# Patient Record
Sex: MALE | Race: WHITE | Employment: UNEMPLOYED | URBAN - NONMETROPOLITAN AREA
[De-identification: names, ages, dates, MRNs, and addresses within clinical notes are randomized per-mention and may not be internally consistent; named-entity substitution may affect disease eponyms.]

---

## 2024-02-29 ENCOUNTER — OFFICE VISIT (OUTPATIENT)
Dept: VASCULAR SURGERY | Age: 59
End: 2024-02-29
Payer: MEDICAID

## 2024-02-29 ENCOUNTER — HOSPITAL ENCOUNTER (OUTPATIENT)
Dept: VASCULAR LAB | Age: 59
Discharge: HOME | End: 2024-02-29
Payer: MEDICAID

## 2024-02-29 VITALS
HEART RATE: 56 BPM | DIASTOLIC BLOOD PRESSURE: 91 MMHG | TEMPERATURE: 97.8 F | SYSTOLIC BLOOD PRESSURE: 138 MMHG | OXYGEN SATURATION: 99 %

## 2024-02-29 DIAGNOSIS — I73.9 CLAUDICATION (HCC): Primary | ICD-10-CM

## 2024-02-29 DIAGNOSIS — I70.245 ATHEROSCLEROSIS OF NATIVE ARTERY OF LEFT LOWER EXTREMITY WITH ULCERATION OF OTHER PART OF FOOT (HCC): ICD-10-CM

## 2024-02-29 PROCEDURE — 93923 UPR/LXTR ART STDY 3+ LVLS: CPT

## 2024-02-29 PROCEDURE — 99203 OFFICE O/P NEW LOW 30 MIN: CPT | Performed by: NURSE PRACTITIONER

## 2024-02-29 RX ORDER — ATORVASTATIN CALCIUM 40 MG/1
40 TABLET, FILM COATED ORAL DAILY
COMMUNITY

## 2024-02-29 RX ORDER — LOSARTAN POTASSIUM 25 MG/1
25 TABLET ORAL DAILY
COMMUNITY

## 2024-02-29 RX ORDER — CHOLECALCIFEROL (VITAMIN D3) 1250 MCG
50000 CAPSULE ORAL DAILY
COMMUNITY

## 2024-02-29 RX ORDER — KETOROLAC TROMETHAMINE 10 MG/1
10 TABLET, FILM COATED ORAL EVERY 6 HOURS PRN
COMMUNITY

## 2024-02-29 RX ORDER — HYDROCODONE BITARTRATE AND ACETAMINOPHEN 5; 325 MG/1; MG/1
1 TABLET ORAL EVERY 6 HOURS PRN
COMMUNITY

## 2024-02-29 RX ORDER — ESCITALOPRAM OXALATE 10 MG/1
10 TABLET ORAL DAILY
COMMUNITY

## 2024-02-29 NOTE — PROGRESS NOTES
Tyler Franks (:  1965) is a 58 y.o. male,New patient, here for evaluation of the following chief complaint(s):  New Patient (wounds ref by jorge mitchell)            SUBJECTIVE/OBJECTIVE:  Tyler has a history of peripheral vascular disease of the lower extremities. He had office TATYANA's with Dr. Mitchell.   He has had this for < 1 year. Current treatment includes lipitor.  Tyler has new wounds that have been present for over a year. Recently, he reports he has very little claudication.  He works out daily and is a .  He has no rest pain.  He sees Wound Care in [place].  He quit smoking 2 weeks ago.    I have personally reviewed the following: problem list, current meds, allergies, PMH, PSH, family hx, and social hx  Tyler Franks is a 58 y.o. male with the following history as recorded in [facility]:  There are no problems to display for this patient.    Current Outpatient Medications   Medication Sig Dispense Refill    atorvastatin (LIPITOR) 40 MG tablet Take 1 tablet by mouth daily      losartan (COZAAR) 25 MG tablet Take 1 tablet by mouth daily      ketorolac (TORADOL) 10 MG tablet Take 1 tablet by mouth every 6 hours as needed for Pain      HYDROcodone-acetaminophen (NORCO) 5-325 MG per tablet Take 1 tablet by mouth every 6 hours as needed for Pain. Max Daily Amount: 4 tablets      escitalopram (LEXAPRO) 10 MG tablet Take 1 tablet by mouth daily      vitamin D (VITAMIN D3) 11488 UNIT CAPS Take 1 capsule by mouth daily       No current facility-administered medications for this visit.     Allergies: Patient has no known allergies.  No past medical history on file.  Past Surgical History:   Procedure Laterality Date    HERNIA REPAIR      SHOULDER SURGERY       No family history on file.  Social History     Tobacco Use    Smoking status: Former     Current packs/day: 1.00     Average packs/day: 1 pack/day for 25.2 years (25.2 ttl pk-yrs)     Types: Cigarettes     Start date: 1999

## 2024-03-04 ENCOUNTER — TELEPHONE (OUTPATIENT)
Dept: VASCULAR SURGERY | Age: 59
End: 2024-03-04

## 2024-03-07 ENCOUNTER — TELEPHONE (OUTPATIENT)
Dept: VASCULAR SURGERY | Age: 59
End: 2024-03-07

## 2024-03-07 RX ORDER — SODIUM CHLORIDE 0.9 % (FLUSH) 0.9 %
5-40 SYRINGE (ML) INJECTION EVERY 12 HOURS SCHEDULED
OUTPATIENT
Start: 2024-03-07

## 2024-03-07 RX ORDER — CLONIDINE HYDROCHLORIDE 0.1 MG/1
0.1 TABLET ORAL PRN
OUTPATIENT
Start: 2024-03-07

## 2024-03-07 RX ORDER — SODIUM CHLORIDE 0.9 % (FLUSH) 0.9 %
5-40 SYRINGE (ML) INJECTION PRN
OUTPATIENT
Start: 2024-03-07

## 2024-03-07 RX ORDER — SODIUM CHLORIDE 9 MG/ML
INJECTION, SOLUTION INTRAVENOUS CONTINUOUS
OUTPATIENT
Start: 2024-03-07

## 2024-03-07 RX ORDER — SODIUM CHLORIDE 9 MG/ML
INJECTION, SOLUTION INTRAVENOUS PRN
OUTPATIENT
Start: 2024-03-07

## 2024-03-07 RX ORDER — ASPIRIN 81 MG/1
81 TABLET ORAL ONCE
OUTPATIENT
Start: 2024-03-07 | End: 2024-03-07

## 2024-03-07 NOTE — TELEPHONE ENCOUNTER
Left a message for the patient to go over the surgery information.  When I contacted the patient last week he stated he would like Shannan to speak with a member of his family.  We have called several times to do this with no answer.  I have left another message this morning.         ----- Message from KAMAR Elder sent at 3/6/2024  6:55 AM CST -----  Can you try to reach him.  I have called and left messages.  I do not see a number for the x wife he wants me to talk with

## 2024-03-07 NOTE — H&P
lower extremity with ulceration of other part of foot (HCC)     undiagnosed new problem with uncertain prognosis    Discussed management of PVD which includes:  continue lipitor to reduce risk of arterial thrombosis and to decrease rate of plaque buildup  Start asa daily  Strongly encourage start/continue statin therapy -   Recommend no smoking - discussed the effect tobacco has on illness;   Proceed with formal taniya today - Lower extremity arterial study: Right TATYANA 0.6, Left TATYANA 0.62.  Individual films reviewed: Yes.  These results were reviewed with the patient.  Disease process is acute or chronic illness or injury that poses a threat to life or bodily function    Has stage III CKD -- gfr 29  Discussed results of taniya and labs with patient.  If we proceed with angio to try to increase chances of healing foot wound, it could compromise the kidneys.  If we do nothing, wounds could progress and at some point lead to amputation.  He wants to discuss with Beena Porter.  We will send note to her for her review.      Addendum Options were discussed with the patient including continued medical management versus proceeding with angiography and potential intervention for non healing foot wound.  Patient has opted to proceed with angiography.  Risks have been discussed with the patient including but not limited to MI, death, CVA, bleed, nerve injury, infection, contrast nephropathy, possible need for dialysis, and need for further surgery.           Patient instructed to walk as much as possible.  Call our office with any progressive pain in leg(s) or hip(s) with walking.  Take good care of your feet.  Let us know right away if you develop a wound on your foot.      An electronic signature was used to authenticate this note.    --KAMAR Elder

## 2024-03-15 ENCOUNTER — HOSPITAL ENCOUNTER (OUTPATIENT)
Dept: INTERVENTIONAL RADIOLOGY/VASCULAR | Age: 59
Discharge: HOME | End: 2024-03-15
Payer: MEDICAID

## 2024-03-15 ENCOUNTER — HOSPITAL ENCOUNTER (OUTPATIENT)
Dept: CT IMAGING | Age: 59
Discharge: HOME | End: 2024-03-15
Payer: MEDICAID

## 2024-03-15 VITALS
HEART RATE: 58 BPM | RESPIRATION RATE: 12 BRPM | SYSTOLIC BLOOD PRESSURE: 168 MMHG | TEMPERATURE: 97.5 F | BODY MASS INDEX: 21.69 KG/M2 | OXYGEN SATURATION: 98 % | WEIGHT: 135 LBS | DIASTOLIC BLOOD PRESSURE: 81 MMHG | HEIGHT: 66 IN

## 2024-03-15 DIAGNOSIS — I73.9 PVD (PERIPHERAL VASCULAR DISEASE) (HCC): ICD-10-CM

## 2024-03-15 LAB
ANION GAP SERPL CALCULATED.3IONS-SCNC: 12 MMOL/L (ref 7–19)
BUN SERPL-MCNC: 29 MG/DL (ref 6–20)
CALCIUM SERPL-MCNC: 9.5 MG/DL (ref 8.6–10)
CHLORIDE SERPL-SCNC: 109 MMOL/L (ref 98–111)
CO2 SERPL-SCNC: 22 MMOL/L (ref 22–29)
CREAT SERPL-MCNC: 2.5 MG/DL (ref 0.5–1.2)
GLUCOSE SERPL-MCNC: 98 MG/DL (ref 74–109)
POTASSIUM SERPL-SCNC: 4.7 MMOL/L (ref 3.5–5)
SODIUM SERPL-SCNC: 143 MMOL/L (ref 136–145)

## 2024-03-15 PROCEDURE — 6360000002 HC RX W HCPCS: Performed by: NURSE PRACTITIONER

## 2024-03-15 PROCEDURE — 6360000002 HC RX W HCPCS: Performed by: SURGERY

## 2024-03-15 PROCEDURE — 36415 COLL VENOUS BLD VENIPUNCTURE: CPT

## 2024-03-15 PROCEDURE — 6360000004 HC RX CONTRAST MEDICATION: Performed by: SURGERY

## 2024-03-15 PROCEDURE — 6370000000 HC RX 637 (ALT 250 FOR IP): Performed by: NURSE PRACTITIONER

## 2024-03-15 PROCEDURE — C1769 GUIDE WIRE: HCPCS

## 2024-03-15 PROCEDURE — 2500000003 HC RX 250 WO HCPCS: Performed by: SURGERY

## 2024-03-15 PROCEDURE — 75635 CT ANGIO ABDOMINAL ARTERIES: CPT

## 2024-03-15 PROCEDURE — 2580000003 HC RX 258: Performed by: NURSE PRACTITIONER

## 2024-03-15 PROCEDURE — 80048 BASIC METABOLIC PNL TOTAL CA: CPT

## 2024-03-15 PROCEDURE — 36140 INTRO NDL ICATH UPR/LXTR ART: CPT

## 2024-03-15 PROCEDURE — 75710 ARTERY X-RAYS ARM/LEG: CPT

## 2024-03-15 RX ORDER — LIDOCAINE HYDROCHLORIDE 20 MG/ML
INJECTION, SOLUTION EPIDURAL; INFILTRATION; INTRACAUDAL; PERINEURAL PRN
Status: COMPLETED | OUTPATIENT
Start: 2024-03-15 | End: 2024-03-15

## 2024-03-15 RX ORDER — SODIUM CHLORIDE 0.9 % (FLUSH) 0.9 %
5-40 SYRINGE (ML) INJECTION EVERY 12 HOURS SCHEDULED
Status: DISCONTINUED | OUTPATIENT
Start: 2024-03-15 | End: 2024-03-15 | Stop reason: SDUPTHER

## 2024-03-15 RX ORDER — HEPARIN SODIUM 5000 [USP'U]/ML
INJECTION, SOLUTION INTRAVENOUS; SUBCUTANEOUS PRN
Status: COMPLETED | OUTPATIENT
Start: 2024-03-15 | End: 2024-03-15

## 2024-03-15 RX ORDER — ASPIRIN 81 MG/1
81 TABLET ORAL ONCE
Qty: 30 TABLET | Refills: 0 | Status: SHIPPED | OUTPATIENT
Start: 2024-03-15 | End: 2024-03-15

## 2024-03-15 RX ORDER — SODIUM CHLORIDE 0.9 % (FLUSH) 0.9 %
5-40 SYRINGE (ML) INJECTION EVERY 12 HOURS SCHEDULED
Status: DISCONTINUED | OUTPATIENT
Start: 2024-03-15 | End: 2024-03-17 | Stop reason: HOSPADM

## 2024-03-15 RX ORDER — DELAFLOXACIN MEGLUMINE 450 MG/1
TABLET ORAL
COMMUNITY
Start: 2024-03-04

## 2024-03-15 RX ORDER — SODIUM CHLORIDE 9 MG/ML
INJECTION, SOLUTION INTRAVENOUS CONTINUOUS
Status: DISCONTINUED | OUTPATIENT
Start: 2024-03-15 | End: 2024-03-17 | Stop reason: HOSPADM

## 2024-03-15 RX ORDER — FENTANYL CITRATE 50 UG/ML
INJECTION, SOLUTION INTRAMUSCULAR; INTRAVENOUS PRN
Status: COMPLETED | OUTPATIENT
Start: 2024-03-15 | End: 2024-03-15

## 2024-03-15 RX ORDER — MIDAZOLAM HYDROCHLORIDE 1 MG/ML
INJECTION INTRAMUSCULAR; INTRAVENOUS PRN
Status: COMPLETED | OUTPATIENT
Start: 2024-03-15 | End: 2024-03-15

## 2024-03-15 RX ORDER — ASPIRIN 81 MG/1
81 TABLET ORAL ONCE
Status: COMPLETED | OUTPATIENT
Start: 2024-03-15 | End: 2024-03-15

## 2024-03-15 RX ORDER — SODIUM CHLORIDE 0.9 % (FLUSH) 0.9 %
5-40 SYRINGE (ML) INJECTION PRN
Status: DISCONTINUED | OUTPATIENT
Start: 2024-03-15 | End: 2024-03-17 | Stop reason: HOSPADM

## 2024-03-15 RX ORDER — IODIXANOL 320 MG/ML
INJECTION, SOLUTION INTRAVASCULAR PRN
Status: COMPLETED | OUTPATIENT
Start: 2024-03-15 | End: 2024-03-15

## 2024-03-15 RX ORDER — SODIUM CHLORIDE 9 MG/ML
INJECTION, SOLUTION INTRAVENOUS PRN
Status: DISCONTINUED | OUTPATIENT
Start: 2024-03-15 | End: 2024-03-17 | Stop reason: HOSPADM

## 2024-03-15 RX ORDER — CLONIDINE HYDROCHLORIDE 0.1 MG/1
0.1 TABLET ORAL PRN
Status: DISCONTINUED | OUTPATIENT
Start: 2024-03-15 | End: 2024-03-17 | Stop reason: HOSPADM

## 2024-03-15 RX ORDER — SODIUM CHLORIDE 9 MG/ML
INJECTION, SOLUTION INTRAVENOUS PRN
Status: DISCONTINUED | OUTPATIENT
Start: 2024-03-15 | End: 2024-03-15 | Stop reason: SDUPTHER

## 2024-03-15 RX ORDER — SODIUM CHLORIDE 0.9 % (FLUSH) 0.9 %
5-40 SYRINGE (ML) INJECTION PRN
Status: DISCONTINUED | OUTPATIENT
Start: 2024-03-15 | End: 2024-03-15 | Stop reason: SDUPTHER

## 2024-03-15 RX ADMIN — IOPAMIDOL 70 ML: 755 INJECTION, SOLUTION INTRAVENOUS at 12:08

## 2024-03-15 RX ADMIN — HEPARIN SODIUM 5000 UNITS: 5000 INJECTION INTRAVENOUS; SUBCUTANEOUS at 11:21

## 2024-03-15 RX ADMIN — ASPIRIN 81 MG: 81 TABLET, COATED ORAL at 09:41

## 2024-03-15 RX ADMIN — CEFAZOLIN 2000 MG: 2 INJECTION, POWDER, FOR SOLUTION INTRAMUSCULAR; INTRAVENOUS at 11:08

## 2024-03-15 RX ADMIN — SODIUM CHLORIDE: 9 INJECTION, SOLUTION INTRAVENOUS at 09:32

## 2024-03-15 RX ADMIN — IODIXANOL 10 ML: 320 INJECTION, SOLUTION INTRAVASCULAR at 11:39

## 2024-03-15 RX ADMIN — FENTANYL CITRATE 25 MCG: 50 INJECTION, SOLUTION INTRAMUSCULAR; INTRAVENOUS at 11:23

## 2024-03-15 RX ADMIN — MIDAZOLAM 0.5 MG: 1 INJECTION INTRAMUSCULAR; INTRAVENOUS at 11:23

## 2024-03-15 RX ADMIN — LIDOCAINE HYDROCHLORIDE 10 ML: 20 INJECTION, SOLUTION EPIDURAL; INFILTRATION; INTRACAUDAL; PERINEURAL at 11:23

## 2024-03-15 NOTE — PROGRESS NOTES
Pt wanted his wallet locked up with security. Security responded to room and have pts wallet secured. Electronically signed by Yvette Mane RN on 3/15/2024 at 10:17 AM

## 2024-03-15 NOTE — PROGRESS NOTES
Dr Tapia at bedside discussing plan of care with pt and pt's sister. Tasia Gamez RN at bedside dressing pt's wound to L foot between first and second toe and bottom of foot. Electronically signed by Yvette aMne RN on 3/15/2024 at 2:55 PM

## 2024-03-15 NOTE — INTERVAL H&P NOTE
Update History & Physical    The patient's History and Physical of March 7, 2024 was reviewed with the patient and I examined the patient. There was no change. The surgical site was confirmed by the patient and me.     Plan: The risks, benefits, expected outcome, and alternative to the recommended procedure have been discussed with the patient. Patient understands and wants to proceed with the procedure.     ASA III, Mallampati 3    Electronically signed by Radha Tapia DO on 3/15/2024 at 11:07 AM

## 2024-03-15 NOTE — DISCHARGE INSTRUCTIONS
1. Keep dressing in place to groin site for 24 hours.  2 Okay to shower after 24 hours.  3.Apply bandaid to site daily for 3 days then remove and leave off.  4.Monitor site for any sign of infection. Notify MD if this occurs.  5.Rest until morning up to bathroom only.   6.Do not drive for 24 hours.  7.Drink one 8-10 oz glass of non-alcoholic liquid every one hour until bedtime.  8.Check the site after each activity for bleeding or swelling.  9.If bleeding occurs, apply pressure to site and call 911 or rush to nearest emergency room.  10.Keep affected leg straight until bedtime doing leg exercises to encourage blood flow, (Try wiggling your toes and rotating your ankle in circles)  11.Resume normal ( non-strenuous) activity tomorrow.  12.Bruising and soreness at the puncture site may occur.  This will heal and clear after several weeks.

## 2024-03-15 NOTE — OP NOTE
Patient Name: Tyler Franks   YOB: 1965   MRN: [id number]     Procedure Date: 3/15/2024     Pre Op Diagnosis: PAD with left foot wound    Post Op Diagnosis: same    Procedure: Left femoral access, left leg runoff    Anesthesia: Local with Moderate Sedation    Estimated Blood Loss: Minimal    Complications: None    Implants: none    Procedure Details: Patient was brought to the angiogram suite and placed supine position.  His bilateral groins were prepped and draped in sterile fashion.  Timeout was performed.  Left common femoral artery was accessed under continuous ultrasound guidance using sterile micropuncture technique.  The micropuncture wire was not able to be advanced into the common iliac artery and therefore a 5 Slovak glide sheath was placed and angiogram was performed using hand-injection into the sheath.  There was occlusion of external iliac artery from the distal point on with reconstitution using internal iliac artery into the aorta.  The proximal SFA and profunda appear to be patent, left common femoral artery appeared to be patent.  Sheath was removed and the manual pressure was held until hemostasis.  Since patient came in with hydration because of the chronic kidney disease we decided to use the hydration and perform CTA of aorta with runoff to define his occlusion and plan for surgical intervention.    Findings: occlusion of left common iliac and external iliac artery, patent distal external iliac, internal iliac artery, left common femoral, proximal SFA and profunda    Disposition:aroused from sedation, and taken to the recovery room in a stable condition    Radha Tapia DO  3/15/2024 11:41 AM

## 2024-03-15 NOTE — PROGRESS NOTES
Pt with increased ectopy with some short runs of non sustained vtach. See chart for rhythm strip. Pt asymptomatic with this. Dr Tapia notified, EKG completed. Dr Tapia en route. Electronically signed by Yvette Mane RN on 3/15/2024 at 11:03 AM  '

## 2024-03-15 NOTE — PROGRESS NOTES
Dr Tapia at bedside, ok to proceed with procedure per MD. Pt NSR with PVCs. Electronically signed by Yvette Mane RN on 3/15/2024 at 11:06 AM

## 2024-03-15 NOTE — PROGRESS NOTES
Pt ambulated approx 50 ft with RN and tolerated activity without complication. L groin site cdi with no s/s of bleeding or hematoma after ambulation. Discharge instructions reviewed with patient and patient states understanding. Patient discharged from cath holding with all belongings and AVS. Pt's sister plans to drive pt home.   Electronically signed by Yvette Mane RN on 3/15/2024 at 3:45 PM

## 2024-03-16 LAB
EKG P AXIS: 75 DEGREES
EKG P-R INTERVAL: 132 MS
EKG Q-T INTERVAL: 426 MS
EKG QRS DURATION: 76 MS
EKG QTC CALCULATION (BAZETT): 431 MS
EKG T AXIS: 62 DEGREES

## 2024-03-28 ENCOUNTER — OFFICE VISIT (OUTPATIENT)
Dept: VASCULAR SURGERY | Age: 59
End: 2024-03-28
Payer: MEDICAID

## 2024-03-28 VITALS
SYSTOLIC BLOOD PRESSURE: 141 MMHG | OXYGEN SATURATION: 100 % | DIASTOLIC BLOOD PRESSURE: 73 MMHG | TEMPERATURE: 97.2 F | HEART RATE: 63 BPM

## 2024-03-28 DIAGNOSIS — I73.9 PVD (PERIPHERAL VASCULAR DISEASE) (HCC): Primary | ICD-10-CM

## 2024-03-28 PROCEDURE — 99213 OFFICE O/P EST LOW 20 MIN: CPT | Performed by: SURGERY

## 2024-03-28 RX ORDER — HYDROCODONE BITARTRATE AND ACETAMINOPHEN 10; 325 MG/1; MG/1
1 TABLET ORAL EVERY 6 HOURS PRN
COMMUNITY

## 2024-03-28 NOTE — PROGRESS NOTES
Tyler Franks (:  1965) is a 58 y.o. male,Established patient, here for evaluation of the following chief complaint(s):  Follow-up (2 week follow up with Dr. Tapia)            SUBJECTIVE/OBJECTIVE:  Tyler has a history of peripheral vascular disease of the lower extremities.  He has had a wound. He works as a    Current treatment includes asa.  Tyler has had new wounds.   He is seeing Dr. Darby. Recently, he reports claudication at a distance of  short distance.  Tyler reports that the right leg is equal to the left.  He reports claudication is not changed and is mostly in the form of generalized weakness starting in the thighs and calves. He has a short recovery time. This is reproducible in nature. He reports ischemic rest pain 0 times per night.      I have personally reviewed the following: problem list, current meds, allergies, PMH, PSH, family hx, and social hx  Tyler Franks is a 58 y.o. male with the following history as recorded in [facility]:  Patient Active Problem List    Diagnosis Date Noted    PVD (peripheral vascular disease) ([facility]) 03/15/2024     Current Outpatient Medications   Medication Sig Dispense Refill    HYDROcodone-acetaminophen (NORCO)  MG per tablet Take 1 tablet by mouth every 6 hours as needed for Pain. Max Daily Amount: 4 tablets      BAXDELA 450 MG TABS tablet TAKE 1 TABLET BY MOUTH TWICE DAILY FOR 14 DAYS      atorvastatin (LIPITOR) 40 MG tablet Take 1 tablet by mouth daily      losartan (COZAAR) 25 MG tablet Take 1 tablet by mouth daily      vitamin D (VITAMIN D3) 19954 UNIT CAPS Take 1 capsule by mouth daily      aspirin (SB LOW DOSE ASA EC) 81 MG EC tablet Take 1 tablet by mouth once for 1 dose 30 tablet 0     No current facility-administered medications for this visit.     Allergies: Patient has no known allergies.  Past Medical History:   Diagnosis Date    CKD (chronic kidney disease) stage 3, GFR 30-59 ml/min ([facility])

## 2024-03-29 ENCOUNTER — TELEPHONE (OUTPATIENT)
Dept: VASCULAR SURGERY | Age: 59
End: 2024-03-29

## 2024-03-29 DIAGNOSIS — R06.02 SOB (SHORTNESS OF BREATH): Primary | ICD-10-CM

## 2024-03-29 NOTE — TELEPHONE ENCOUNTER
Called and let the patient know that we have him scheduled for a stress test on 04/09/2024 @ 1:00 PM.  This must be completed prior to surgery.  The patient should be NPO for 4 hours prior to this appointment.  The patient voiced understanding.         ----- Message from KAMAR Elder sent at 3/28/2024  2:49 PM CDT -----  Needs stress test; dx sob; then intraoperative angio with iliac angioplasty stent and fem fem bypass.  Remind me Monday and I will do a sheet

## 2024-04-09 ENCOUNTER — HOSPITAL ENCOUNTER (OUTPATIENT)
Dept: NON INVASIVE DIAGNOSTICS | Age: 59
Discharge: HOME | End: 2024-04-09
Payer: MEDICAID

## 2024-04-09 VITALS — WEIGHT: 134.48 LBS | BODY MASS INDEX: 21.71 KG/M2

## 2024-04-09 DIAGNOSIS — R06.02 SOB (SHORTNESS OF BREATH): ICD-10-CM

## 2024-04-09 PROCEDURE — 2500000003 HC RX 250 WO HCPCS: Performed by: NURSE PRACTITIONER

## 2024-04-09 PROCEDURE — 6360000002 HC RX W HCPCS: Performed by: NURSE PRACTITIONER

## 2024-04-09 PROCEDURE — 93350 STRESS TTE ONLY: CPT

## 2024-04-09 RX ORDER — DOBUTAMINE HYDROCHLORIDE 200 MG/100ML
10 INJECTION INTRAVENOUS CONTINUOUS PRN
Status: DISCONTINUED | OUTPATIENT
Start: 2024-04-09 | End: 2024-04-10 | Stop reason: HOSPADM

## 2024-04-09 RX ORDER — ATROPINE SULFATE 0.1 MG/ML
1 INJECTION INTRAVENOUS PRN
Status: DISCONTINUED | OUTPATIENT
Start: 2024-04-09 | End: 2024-04-10 | Stop reason: HOSPADM

## 2024-04-09 RX ORDER — SODIUM CHLORIDE 9 MG/ML
INJECTION, SOLUTION INTRAVENOUS
Status: DISCONTINUED | OUTPATIENT
Start: 2024-04-09 | End: 2024-04-10 | Stop reason: HOSPADM

## 2024-04-09 RX ORDER — METOPROLOL TARTRATE 1 MG/ML
5 INJECTION, SOLUTION INTRAVENOUS PRN
Status: DISCONTINUED | OUTPATIENT
Start: 2024-04-09 | End: 2024-04-10 | Stop reason: HOSPADM

## 2024-04-09 RX ADMIN — DOBUTAMINE HYDROCHLORIDE 10 MCG/KG/MIN: 200 INJECTION INTRAVENOUS at 13:20

## 2024-04-09 RX ADMIN — ATROPINE SULFATE 1 MG: 0.1 INJECTION, SOLUTION INTRAVENOUS at 13:28

## 2024-04-09 RX ADMIN — METOPROLOL TARTRATE 5 MG: 1 INJECTION, SOLUTION INTRAVENOUS at 13:33

## 2024-04-10 ENCOUNTER — OFFICE VISIT (OUTPATIENT)
Dept: CARDIOLOGY CLINIC | Age: 59
End: 2024-04-10
Payer: MEDICAID

## 2024-04-10 VITALS
HEIGHT: 66 IN | BODY MASS INDEX: 21.86 KG/M2 | WEIGHT: 136 LBS | DIASTOLIC BLOOD PRESSURE: 98 MMHG | HEART RATE: 62 BPM | SYSTOLIC BLOOD PRESSURE: 146 MMHG

## 2024-04-10 DIAGNOSIS — I10 ESSENTIAL HYPERTENSION: ICD-10-CM

## 2024-04-10 DIAGNOSIS — Z82.49 FAMILY HISTORY OF EARLY CAD: ICD-10-CM

## 2024-04-10 DIAGNOSIS — R94.39 ABNORMAL STRESS TEST: Primary | ICD-10-CM

## 2024-04-10 PROCEDURE — 99204 OFFICE O/P NEW MOD 45 MIN: CPT | Performed by: NURSE PRACTITIONER

## 2024-04-10 PROCEDURE — 3080F DIAST BP >= 90 MM HG: CPT | Performed by: NURSE PRACTITIONER

## 2024-04-10 PROCEDURE — 3077F SYST BP >= 140 MM HG: CPT | Performed by: NURSE PRACTITIONER

## 2024-04-10 ASSESSMENT — ENCOUNTER SYMPTOMS
COUGH: 0
SHORTNESS OF BREATH: 0
CHEST TIGHTNESS: 0
SORE THROAT: 0
WHEEZING: 0

## 2024-04-10 NOTE — PROGRESS NOTES
Types: Cigarettes     Start date: 1/1/1999     Passive exposure: Past    Smokeless tobacco: Never   Substance Use Topics    Alcohol use: Not Currently          Review of System:    Review of Systems   Constitutional:  Negative for activity change, chills, diaphoresis, fatigue and fever.   HENT:  Negative for congestion and sore throat.    Respiratory:  Negative for cough, chest tightness, shortness of breath and wheezing.    Cardiovascular:  Negative for chest pain, palpitations and leg swelling.   Neurological:  Negative for dizziness, syncope, light-headedness and headaches.   Psychiatric/Behavioral:  Negative for confusion. The patient is not nervous/anxious.         Objective:    BP (!) 146/98   Pulse 62   Ht 1.676 m (5' 6\")   Wt 61.7 kg (136 lb)   BMI 21.95 kg/m²     GENERAL - well developed and well nourished    HEENT -  PERRLA, Hearing appears normal, conjunctive and lids are normal.  External inspection of ears and nose appear normal.  NECK - no thyromegaly, no JVD, trachea is in the midline  CARDIOVASCULAR - PMI is in the mid line clavicular position, Normal S1 and S2 with no systolic murmur.  No S3 or S4    PULMONARY - no respiratory distress.  No wheezes or rales. Lungs are clear to ausculation, normal respiratory effort.  ABDOMEN  - soft, non tender, no rebound  MUSCULOSKELETAL  - range of motion of the upper and lower extermites appears normal and equal and is without pain   EXTREMITIES - no significant edema   NEUROLOGIC - gait and station are normal  SKIN - turgor is normal, skin warm and dry.  PSYCHIATRIC - normal mood and affect, alert and orientated x 3,      ASSESSMENT:    ALL THE CARDIOLOGY PROBLEMS ARE LISTED ABOVE; HOWEVER, THE FOLLOWING SPECIFIC CARDIAC PROBLEMS / CONDITIONS WERE ADDRESSED AND TREATED DURING THE OFFICE VISIT TODAY:       Cardiac Specific Problem  Discussion and Plan         1. Abnormal dobutamine stress echocardiogram Initial encounter   Will schedule cardiac

## 2024-04-23 ENCOUNTER — HOSPITAL ENCOUNTER (OUTPATIENT)
Dept: CARDIAC CATH/INVASIVE PROCEDURES | Age: 59
Discharge: HOME | End: 2024-04-23
Attending: INTERNAL MEDICINE | Admitting: INTERNAL MEDICINE
Payer: MEDICAID

## 2024-04-23 VITALS
WEIGHT: 136 LBS | OXYGEN SATURATION: 98 % | DIASTOLIC BLOOD PRESSURE: 88 MMHG | BODY MASS INDEX: 21.86 KG/M2 | HEART RATE: 63 BPM | RESPIRATION RATE: 18 BRPM | HEIGHT: 66 IN | SYSTOLIC BLOOD PRESSURE: 155 MMHG | TEMPERATURE: 98.3 F

## 2024-04-23 PROBLEM — R94.39 ABNORMAL STRESS TEST: Status: ACTIVE | Noted: 2024-04-23

## 2024-04-23 LAB
ANION GAP SERPL CALCULATED.3IONS-SCNC: 10 MMOL/L (ref 7–19)
BUN SERPL-MCNC: 28 MG/DL (ref 6–20)
CALCIUM SERPL-MCNC: 9 MG/DL (ref 8.6–10)
CHLORIDE SERPL-SCNC: 109 MMOL/L (ref 98–111)
CO2 SERPL-SCNC: 23 MMOL/L (ref 22–29)
CREAT SERPL-MCNC: 2.4 MG/DL (ref 0.5–1.2)
EKG P AXIS: 82 DEGREES
EKG P-R INTERVAL: 132 MS
EKG Q-T INTERVAL: 420 MS
EKG QRS DURATION: 78 MS
EKG QTC CALCULATION (BAZETT): 432 MS
EKG T AXIS: 70 DEGREES
ERYTHROCYTE [DISTWIDTH] IN BLOOD BY AUTOMATED COUNT: 14.2 % (ref 11.5–14.5)
GLUCOSE SERPL-MCNC: 102 MG/DL (ref 74–109)
HCT VFR BLD AUTO: 43.5 % (ref 42–52)
HGB BLD-MCNC: 14.1 G/DL (ref 14–18)
MCH RBC QN AUTO: 31.3 PG (ref 27–31)
MCHC RBC AUTO-ENTMCNC: 32.4 G/DL (ref 33–37)
MCV RBC AUTO: 96.5 FL (ref 80–94)
PLATELET # BLD AUTO: 139 K/UL (ref 130–400)
PMV BLD AUTO: 12.6 FL (ref 9.4–12.4)
POTASSIUM SERPL-SCNC: 5.1 MMOL/L (ref 3.5–5)
RBC # BLD AUTO: 4.51 M/UL (ref 4.7–6.1)
SODIUM SERPL-SCNC: 142 MMOL/L (ref 136–145)
WBC # BLD AUTO: 8.7 K/UL (ref 4.8–10.8)

## 2024-04-23 PROCEDURE — 93458 L HRT ARTERY/VENTRICLE ANGIO: CPT

## 2024-04-23 PROCEDURE — 2500000003 HC RX 250 WO HCPCS

## 2024-04-23 PROCEDURE — 80048 BASIC METABOLIC PNL TOTAL CA: CPT

## 2024-04-23 PROCEDURE — C1769 GUIDE WIRE: HCPCS

## 2024-04-23 PROCEDURE — 6370000000 HC RX 637 (ALT 250 FOR IP): Performed by: INTERNAL MEDICINE

## 2024-04-23 PROCEDURE — 99152 MOD SED SAME PHYS/QHP 5/>YRS: CPT | Performed by: INTERNAL MEDICINE

## 2024-04-23 PROCEDURE — 93458 L HRT ARTERY/VENTRICLE ANGIO: CPT | Performed by: INTERNAL MEDICINE

## 2024-04-23 PROCEDURE — 6360000002 HC RX W HCPCS

## 2024-04-23 PROCEDURE — 99221 1ST HOSP IP/OBS SF/LOW 40: CPT | Performed by: INTERNAL MEDICINE

## 2024-04-23 PROCEDURE — 85027 COMPLETE CBC AUTOMATED: CPT

## 2024-04-23 PROCEDURE — 99024 POSTOP FOLLOW-UP VISIT: CPT | Performed by: INTERNAL MEDICINE

## 2024-04-23 PROCEDURE — 2580000003 HC RX 258: Performed by: INTERNAL MEDICINE

## 2024-04-23 PROCEDURE — 36415 COLL VENOUS BLD VENIPUNCTURE: CPT

## 2024-04-23 PROCEDURE — 6360000004 HC RX CONTRAST MEDICATION: Performed by: INTERNAL MEDICINE

## 2024-04-23 PROCEDURE — C1894 INTRO/SHEATH, NON-LASER: HCPCS

## 2024-04-23 PROCEDURE — 93005 ELECTROCARDIOGRAM TRACING: CPT

## 2024-04-23 PROCEDURE — 99152 MOD SED SAME PHYS/QHP 5/>YRS: CPT

## 2024-04-23 PROCEDURE — 2709999900 HC NON-CHARGEABLE SUPPLY

## 2024-04-23 RX ORDER — SODIUM CHLORIDE 0.9 % (FLUSH) 0.9 %
5-40 SYRINGE (ML) INJECTION EVERY 12 HOURS SCHEDULED
Status: DISCONTINUED | OUTPATIENT
Start: 2024-04-23 | End: 2024-04-23 | Stop reason: HOSPADM

## 2024-04-23 RX ORDER — ONDANSETRON 2 MG/ML
4 INJECTION INTRAMUSCULAR; INTRAVENOUS EVERY 6 HOURS PRN
Status: DISCONTINUED | OUTPATIENT
Start: 2024-04-23 | End: 2024-04-23 | Stop reason: HOSPADM

## 2024-04-23 RX ORDER — ACETAMINOPHEN 325 MG/1
650 TABLET ORAL EVERY 4 HOURS PRN
Status: DISCONTINUED | OUTPATIENT
Start: 2024-04-23 | End: 2024-04-23 | Stop reason: HOSPADM

## 2024-04-23 RX ORDER — SODIUM CHLORIDE 9 MG/ML
INJECTION, SOLUTION INTRAVENOUS CONTINUOUS
Status: DISCONTINUED | OUTPATIENT
Start: 2024-04-23 | End: 2024-04-23 | Stop reason: HOSPADM

## 2024-04-23 RX ORDER — SODIUM CHLORIDE 0.9 % (FLUSH) 0.9 %
5-40 SYRINGE (ML) INJECTION PRN
Status: DISCONTINUED | OUTPATIENT
Start: 2024-04-23 | End: 2024-04-23 | Stop reason: HOSPADM

## 2024-04-23 RX ORDER — ASPIRIN 81 MG/1
81 TABLET ORAL ONCE
Status: COMPLETED | OUTPATIENT
Start: 2024-04-23 | End: 2024-04-23

## 2024-04-23 RX ORDER — IODIXANOL 320 MG/ML
70 INJECTION, SOLUTION INTRAVASCULAR
Status: COMPLETED | OUTPATIENT
Start: 2024-04-23 | End: 2024-04-23

## 2024-04-23 RX ORDER — SODIUM CHLORIDE 9 MG/ML
INJECTION, SOLUTION INTRAVENOUS PRN
Status: DISCONTINUED | OUTPATIENT
Start: 2024-04-23 | End: 2024-04-23 | Stop reason: HOSPADM

## 2024-04-23 RX ADMIN — SODIUM CHLORIDE: 9 INJECTION, SOLUTION INTRAVENOUS at 08:36

## 2024-04-23 RX ADMIN — ASPIRIN 81 MG: 81 TABLET, COATED ORAL at 08:36

## 2024-04-23 RX ADMIN — IODIXANOL 70 ML: 320 INJECTION, SOLUTION INTRAVASCULAR at 11:41

## 2024-04-23 NOTE — H&P
Office Visit    4/10/2024  [facility]       Lawanda Saucedo, APRN - NP  Nurse Practitioner Adult Health Abnormal stress test +2 more  Dx New Patient  Reason for Visit     Progress Notes  Lawanda Saucedo APRN - NP (Nurse Practitioner)  Nurse Practitioner Adult Health  Expand All Collapse All  [facility]  [address]  [address]  [facility] [address] [phone]        Chief Complaint / Reason for Being Seen: Abnormal stress test     1. Abnormal stress test    2. Essential hypertension    3. Family history of early CAD          Patient with a family history of early coronary artery disease which includes his mother and brother.  Former smoker quit about 2 months ago.  Does have a history of hypertension and dyslipidemia.  As well as chronic kidney disease stage III.  Patient being followed by vascular and anticipating surgery.  Preoperatively he had an EKG that was abnormal.  He did undergo dobutamine stress echocardiogram yesterday.     Overall Impression:   1. No chest pain with dobutamine infuison.   2 No significant ST T wave changes with dobutamine. ECG portion is   negative for ischemia by diagnostic criteria but patient did have lots of   ectopy during infusion. BP. up to 225/119. Lopressor given to decrease BP.   BP down to 158/102 at end of test. Ectopy had improved slightly after 5mg   of lopressor.   3. Following dobutamine, there was noted to be global hypokinesis with   echocardiographic showing evidence of possible myocardial ischemia.      Signature      ----------------------------------------------------------------   Electronically signed by Deejay Christina MD(Interpreting   physician) on 04/10/2024 10:31 AM   ----------------------------------------------------------------     Patient presents to clinic today to follow-up abnormal dobutamine stress echocardiogram.  He denies any chest pain, pressure or tightness.  There is no shortness of breath, orthopnea or PND.

## 2024-04-23 NOTE — DISCHARGE INSTRUCTIONS
Please have your labs drawn at [facility] in [facility] on 4/26 to check your kidney levels.         PATIENT INSTRUCTIONS- POST RADIAL CARDIAC CATH/ANGIOPLASTY      Do not subject hand/arm to any forceful movements for 24 hours (i.e. Supporting weight) when rising from a chair or bed.       For 2 days following discharge:  Do Not  Operate a motor vehicle, tractor, lawnmower, motorcycle or all-terrain vehicle.  Do Not  Lift anything heavier than 1 pound with affected arm.  Avoid  Excessive (extension/flexion) wrist movement.      Avoid heavy lifting with affected arm for 3 days following discharge.      Do Not engage in vigorous exercise (i.e. Tennis, ect.) using affected arm for 5 days following discharge.     If bleeding should occur while in the hospital, apply firm pressure to the site an call your nurse.     If bleeding should occur following discharge:  Sit down and apply firm pressure to site with your fingers x 10 mins.  If the bleeding stops, continue to sit quietly, keeping your wrist straight for 2 hours. Notify your physician as soon as possible.  If bleeding DOES NOT STOP after 10 mins or if there is a large amount of bleeding or spurting , CALL 911 immediately. DO NOT DRIVE YOURSELF TO THE HOSPITAL.     Remove bandage 24 hours following application and replace with a band aid.     You may shower on the day following your procedure. Do not take a tub bath for 3 days following discharge. Do not submerge arm in dishwater or other water sources for 5 days.

## 2024-04-23 NOTE — PROGRESS NOTES
Cardiac catheterization performed right radial artery approach tolerated well  Left ventricular function satisfactory  Mild noncritical coronary disease 30 to 40% mid right 20 to 30% ostial circumflex  Risk factor modification recommended  No contraindication to surgery from a cardiac standpoint

## 2024-04-23 NOTE — PROGRESS NOTES
Pt ambulated without complication. R radial site cdi with no s/s of bleeding or hematoma present. Discharge instructions reviewed with patient and patient states understanding. Patient discharged from cath holding with all belongings and AVS.  Electronically signed by Yvette Mane RN on 4/23/2024 at 2:29 PM

## 2024-05-02 ENCOUNTER — TELEPHONE (OUTPATIENT)
Dept: VASCULAR SURGERY | Age: 59
End: 2024-05-02

## 2024-05-02 NOTE — TELEPHONE ENCOUNTER
Dr. Alondra Darby office called to have last office notes faxed to them. Fax: [phone]. Please advise.

## 2024-05-07 ENCOUNTER — PREP FOR PROCEDURE (OUTPATIENT)
Dept: VASCULAR SURGERY | Age: 59
End: 2024-05-07

## 2024-05-07 DIAGNOSIS — I73.9 PERIPHERAL VASCULAR DISEASE, UNSPECIFIED (HCC): ICD-10-CM

## 2024-05-07 DIAGNOSIS — Z01.818 PRE-OP TESTING: Primary | ICD-10-CM

## 2024-05-08 ENCOUNTER — TELEPHONE (OUTPATIENT)
Dept: VASCULAR SURGERY | Age: 59
End: 2024-05-08

## 2024-05-08 NOTE — TELEPHONE ENCOUNTER
Spoke to the patient's daughter to let her know the following.  I will call in the Bactroban to the local pharmacy on file.           Tyler Franks     Surgery Directions     Report to the outpatient registration at [facility] on Monday,        06/03/2024.  The surgery department will contact you   Nothing to eat or drink after midnight the night before the procedure.  Please take all medications as normally scheduled to take unless listed below with a sip of water.  Do not take Losartan the morning of the procedure. You will need to use Bactroban in your nose twice a day five days prior to surgery. This will be called to your local pharmacy on file.   If you have sleep apnea and require C-PAP, please bring this with you to the hospital.  Bring a list of all of your allergies and medications with you to the hospital.  Please let our nurse know if you have had an allergy to iodine, shellfish, or x-ray dye.  Let the nurse know if you take any of the following:  Over the counter herbal supplements  Diclofenec, indomethacin, ketoprofen, Caridopa/levadopa, naproxen, sulindac, piroxicam, glucosamine, Chondrotin, cocchine, or methotrexate.  Plan to stay at the hospital for 4 - 6 hours before being released  by the physician. You will need someone to drive you home after the procedure.  Please register at the outpatient area of the [facility] on Tuesday, 05/28/2024 @ 12:30 PM for pre-op testing.  You will not need to be fasting prior to this appointment.   11. Other Directions: For any questions or concerns please contact our office @        [phone] and ask to speak to Shira.   12.  You will need a  to take you home.  13.  Follow up appt: 06/17/20024 @ 9:45 AM with Shannan.      Unless instructed otherwise by your physician, cleanse incision/puncture site twice daily with soap and water.  Apply dry gauze. Do not get in tub.  Okay to shower.  Do not apply any salve, cream, peroxide or

## 2024-05-19 ENCOUNTER — APPOINTMENT (OUTPATIENT)
Dept: CT IMAGING | Age: 59
End: 2024-05-19
Payer: MEDICAID

## 2024-05-19 ENCOUNTER — HOSPITAL ENCOUNTER (EMERGENCY)
Age: 59
Discharge: HOME | End: 2024-05-19
Attending: EMERGENCY MEDICINE
Payer: MEDICAID

## 2024-05-19 VITALS
BODY MASS INDEX: 21.79 KG/M2 | TEMPERATURE: 98.5 F | RESPIRATION RATE: 14 BRPM | SYSTOLIC BLOOD PRESSURE: 146 MMHG | WEIGHT: 135 LBS | OXYGEN SATURATION: 98 % | DIASTOLIC BLOOD PRESSURE: 88 MMHG | HEART RATE: 71 BPM

## 2024-05-19 DIAGNOSIS — N18.9 CHRONIC KIDNEY DISEASE, UNSPECIFIED CKD STAGE: ICD-10-CM

## 2024-05-19 DIAGNOSIS — I73.9 PERIPHERAL VASCULAR DISEASE (HCC): Primary | ICD-10-CM

## 2024-05-19 LAB
ALBUMIN SERPL-MCNC: 4 G/DL (ref 3.5–5.2)
ALP SERPL-CCNC: 88 U/L (ref 40–130)
ALT SERPL-CCNC: 17 U/L (ref 5–41)
ANION GAP SERPL CALCULATED.3IONS-SCNC: 13 MMOL/L (ref 7–19)
AST SERPL-CCNC: 18 U/L (ref 5–40)
BASOPHILS # BLD: 0.1 K/UL (ref 0–0.2)
BASOPHILS NFR BLD: 0.8 % (ref 0–1)
BILIRUB SERPL-MCNC: 0.3 MG/DL (ref 0.2–1.2)
BUN SERPL-MCNC: 37 MG/DL (ref 6–20)
CALCIUM SERPL-MCNC: 8.9 MG/DL (ref 8.6–10)
CHLORIDE SERPL-SCNC: 106 MMOL/L (ref 98–111)
CO2 SERPL-SCNC: 22 MMOL/L (ref 22–29)
CREAT SERPL-MCNC: 2.8 MG/DL (ref 0.5–1.2)
EOSINOPHIL # BLD: 0.1 K/UL (ref 0–0.6)
EOSINOPHIL NFR BLD: 1.3 % (ref 0–5)
ERYTHROCYTE [DISTWIDTH] IN BLOOD BY AUTOMATED COUNT: 13.8 % (ref 11.5–14.5)
GLUCOSE SERPL-MCNC: 108 MG/DL (ref 74–109)
HCT VFR BLD AUTO: 44.4 % (ref 42–52)
HGB BLD-MCNC: 14.5 G/DL (ref 14–18)
IMM GRANULOCYTES # BLD: 0 K/UL
LACTATE BLDV-SCNC: 1.1 MG/DL (ref 0.5–1.9)
LYMPHOCYTES # BLD: 3.8 K/UL (ref 1.1–4.5)
LYMPHOCYTES NFR BLD: 36.8 % (ref 20–40)
MCH RBC QN AUTO: 30.7 PG (ref 27–31)
MCHC RBC AUTO-ENTMCNC: 32.7 G/DL (ref 33–37)
MCV RBC AUTO: 94.1 FL (ref 80–94)
MONOCYTES # BLD: 0.9 K/UL (ref 0–0.9)
MONOCYTES NFR BLD: 8.3 % (ref 0–10)
NEUTROPHILS # BLD: 5.4 K/UL (ref 1.5–7.5)
NEUTS SEG NFR BLD: 52.6 % (ref 50–65)
PLATELET # BLD AUTO: 159 K/UL (ref 130–400)
PMV BLD AUTO: 11.8 FL (ref 9.4–12.4)
POTASSIUM SERPL-SCNC: 4.9 MMOL/L (ref 3.5–5)
PROT SERPL-MCNC: 6.7 G/DL (ref 6.6–8.7)
RBC # BLD AUTO: 4.72 M/UL (ref 4.7–6.1)
SODIUM SERPL-SCNC: 141 MMOL/L (ref 136–145)
WBC # BLD AUTO: 10.3 K/UL (ref 4.8–10.8)

## 2024-05-19 PROCEDURE — 80053 COMPREHEN METABOLIC PANEL: CPT

## 2024-05-19 PROCEDURE — 36415 COLL VENOUS BLD VENIPUNCTURE: CPT

## 2024-05-19 PROCEDURE — 85025 COMPLETE CBC W/AUTO DIFF WBC: CPT

## 2024-05-19 PROCEDURE — 6360000004 HC RX CONTRAST MEDICATION: Performed by: EMERGENCY MEDICINE

## 2024-05-19 PROCEDURE — 75635 CT ANGIO ABDOMINAL ARTERIES: CPT

## 2024-05-19 PROCEDURE — 83605 ASSAY OF LACTIC ACID: CPT

## 2024-05-19 PROCEDURE — 99285 EMERGENCY DEPT VISIT HI MDM: CPT

## 2024-05-19 RX ADMIN — IOPAMIDOL 70 ML: 755 INJECTION, SOLUTION INTRAVENOUS at 19:28

## 2024-05-19 NOTE — ED PROVIDER NOTES
[facility] EMERGENCY DEPT  eMERGENCY dEPARTMENT eNCOUnter      Pt Name: Tyler Franks  MRN: [id number]  Birthdate 1965  Date of evaluation: 5/19/2024  Provider: James Koroma MD    CHIEF COMPLAINT       Chief Complaint   Patient presents with    Extremity Weakness     Bilaterally          HISTORY OF PRESENT ILLNESS   (Location/Symptom, Timing/Onset,Context/Setting, Quality, Duration, Modifying Factors, Severity)  Note limiting factors.   Tyler Franks is a 58 y.o. male who presents to the emergency department for evaluation regarding feelings of left lower extremity weakness, numbness and pain.  Patient states he is due to have upcoming vascular bypass procedure by Dr. Tapia.  He has been having ongoing issues for the past couple of weeks however today symptoms became quite a bit worse.  He describes feelings of numbness and feeling like his left leg is giving out.  He is not currently maintained on any oral anticoagulant medications other than low-dose aspirin.  Denies fevers or chills.    HPI    NursingNotes were reviewed.    REVIEW OF SYSTEMS    (2-9 systems for level 4, 10 or more for level 5)     Review of Systems   Constitutional:  Negative for chills and fever.   Respiratory:  Negative for shortness of breath.    Cardiovascular:  Negative for chest pain and palpitations.   Gastrointestinal:  Negative for abdominal pain, diarrhea, nausea and vomiting.   Neurological:  Positive for weakness (LLE) and numbness.   All other systems reviewed and are negative.           PAST MEDICALHISTORY     Past Medical History:   Diagnosis Date    CKD (chronic kidney disease) stage 3, GFR 30-59 ml/min (HCC)     Hypertension          SURGICAL HISTORY       Past Surgical History:   Procedure Laterality Date    HAND SURGERY Right     HERNIA REPAIR      VASCULAR SURGERY  03/15/2024    Left femoral access, left leg runoff         CURRENT MEDICATIONS     Discharge Medication List as of 5/19/2024 10:02 PM        CONTINUE  Take 1 tablet by mouth once for 1 dose    ATORVASTATIN (LIPITOR) 40 MG TABLET    Take 1 tablet by mouth daily    HYDROCODONE-ACETAMINOPHEN (NORCO)  MG PER TABLET    Take 1 tablet by mouth every 6 hours as needed for Pain.    LOSARTAN (COZAAR) 25 MG TABLET    Take 1 tablet by mouth daily    VITAMIN D (VITAMIN D3) 29718 UNIT CAPS    Take 1 capsule by mouth daily       ALLERGIES     Patient has no known allergies.    FAMILY HISTORY     History reviewed. No pertinent family history.       SOCIAL HISTORY       Social History     Socioeconomic History    Marital status: Single     Spouse name: None    Number of children: None    Years of education: None    Highest education level: None   Tobacco Use    Smoking status: Former     Current packs/day: 1.00     Average packs/day: 1 pack/day for 25.4 years (25.4 ttl pk-yrs)     Types: Cigarettes     Start date: 1/1/1999     Passive exposure: Past    Smokeless tobacco: Never   Substance and Sexual Activity    Alcohol use: Not Currently       SCREENINGS    Zhang Coma Scale  Eye Opening: Spontaneous  Best Verbal Response: Oriented  Best Motor Response: Obeys commands  Phelps Coma Scale Score: 15        PHYSICAL EXAM    (up to 7 for level 4, 8 or more for level 5)     ED Triage Vitals [05/19/24 1816]   BP Temp Temp src Pulse Respirations SpO2 Height Weight - Scale   (!) 168/73 98.5 °F (36.9 °C) -- 89 18 97 % -- 61.2 kg (135 lb)       Physical Exam  Vitals and nursing note reviewed.   HENT:      Head: Atraumatic.      Mouth/Throat:      Mouth: Mucous membranes are moist. Mucous membranes are not dry.   Eyes:      General: No scleral icterus.     Pupils: Pupils are equal, round, and reactive to light.   Neck:      Trachea: No tracheal deviation.   Cardiovascular:      Rate and Rhythm: Normal rate and regular rhythm.      Heart sounds: Normal heart sounds. No murmur heard.  Pulmonary:      Effort: Pulmonary effort is normal. No respiratory distress.      Breath sounds: Normal

## 2024-05-20 ENCOUNTER — OFFICE VISIT (OUTPATIENT)
Dept: CARDIOLOGY CLINIC | Age: 59
End: 2024-05-20
Payer: MEDICAID

## 2024-05-20 VITALS
DIASTOLIC BLOOD PRESSURE: 84 MMHG | OXYGEN SATURATION: 98 % | BODY MASS INDEX: 21.05 KG/M2 | HEART RATE: 77 BPM | SYSTOLIC BLOOD PRESSURE: 126 MMHG | HEIGHT: 66 IN | WEIGHT: 131 LBS

## 2024-05-20 DIAGNOSIS — E78.5 DYSLIPIDEMIA: ICD-10-CM

## 2024-05-20 DIAGNOSIS — I25.10 CORONARY ARTERY DISEASE INVOLVING NATIVE CORONARY ARTERY OF NATIVE HEART WITHOUT ANGINA PECTORIS: ICD-10-CM

## 2024-05-20 DIAGNOSIS — I10 ESSENTIAL HYPERTENSION: Primary | ICD-10-CM

## 2024-05-20 PROCEDURE — 99214 OFFICE O/P EST MOD 30 MIN: CPT | Performed by: NURSE PRACTITIONER

## 2024-05-20 PROCEDURE — 3074F SYST BP LT 130 MM HG: CPT | Performed by: NURSE PRACTITIONER

## 2024-05-20 PROCEDURE — 3079F DIAST BP 80-89 MM HG: CPT | Performed by: NURSE PRACTITIONER

## 2024-05-20 RX ORDER — FINASTERIDE 5 MG/1
5 TABLET, FILM COATED ORAL DAILY
COMMUNITY

## 2024-05-20 ASSESSMENT — ENCOUNTER SYMPTOMS
WHEEZING: 0
SORE THROAT: 0
SHORTNESS OF BREATH: 0
COUGH: 0
CHEST TIGHTNESS: 0

## 2024-05-20 NOTE — ED NOTES
Pt ambulated with the assistance of this RN per PO orders by Dr. Koroma. Pt was able to ambulate out of the room in the ED, walked approx 50ft and returned back to the room he started at. Pt said \" there is some pain, but no numbness in my left leg.\" This RN notified Dr. Koroma.

## 2024-05-20 NOTE — PROGRESS NOTES
[facility] Cardiology   Established Patient Office Visit  [address]  [address]  [facility] [id number] [phone]        OFFICE VISIT:  2024    Tyler Franks - : 1965    Reason For Visit:  Tyler is a 58 y.o. male who is here for Follow-Up from Hospital    1. Essential hypertension    2. Dyslipidemia    3. Coronary artery disease involving native coronary artery of native heart without angina pectoris          Patient with a family history of early coronary artery disease which includes his mother and brother.  Former smoker quit about 2 months ago.  Does have a history of hypertension and dyslipidemia.  As well as chronic kidney disease stage III.  Patient being followed by vascular and anticipating surgery.  Preoperatively he had an EKG that was abnormal.  He did undergo dobutamine stress echocardiogram.     Overall Impression:   1. No chest pain with dobutamine infuison.   2 No significant ST T wave changes with dobutamine. ECG portion is   negative for ischemia by diagnostic criteria but patient did have lots of   ectopy during infusion. BP. up to 225/119. Lopressor given to decrease BP.   BP down to 158/102 at end of test. Ectopy had improved slightly after 5mg   of lopressor.   3. Following dobutamine, there was noted to be global hypokinesis with   echocardiographic showing evidence of possible myocardial ischemia.      Signature      ----------------------------------------------------------------   Electronically signed by Deejay Christina MD(Interpreting   physician) on 04/10/2024 10:31 AM   ----------------------------------------------------------------     Due to the abnormal dobutamine stress echocardiogram patient underwent diagnostic cardiac catheterization on 2024  Conclusions      Mildly decreased left ventricular systolic function   Mild nonobstructive coronary artery disease      Recommendations      Routine Post Diagnostic Cath orders.   Risk factor modification.

## 2024-05-22 ENCOUNTER — TELEPHONE (OUTPATIENT)
Dept: VASCULAR SURGERY | Age: 59
End: 2024-05-22

## 2024-05-22 NOTE — TELEPHONE ENCOUNTER
Contacted the patient to let him know the following.  The patient voiced understanding.         ----- Message from KAMAR Elder sent at 5/22/2024  6:19 AM CDT -----  Please let patient know we have reviewed notes from ER.  We do not see any change.

## 2024-05-28 ENCOUNTER — HOSPITAL ENCOUNTER (OUTPATIENT)
Dept: GENERAL RADIOLOGY | Age: 59
Discharge: HOME | End: 2024-05-28
Payer: MEDICAID

## 2024-05-28 PROCEDURE — 71046 X-RAY EXAM CHEST 2 VIEWS: CPT

## 2024-05-30 RX ORDER — ASPIRIN 81 MG/1
81 TABLET ORAL ONCE
Status: CANCELLED | OUTPATIENT
Start: 2024-05-30 | End: 2024-05-30

## 2024-05-30 RX ORDER — SODIUM CHLORIDE 0.9 % (FLUSH) 0.9 %
5-40 SYRINGE (ML) INJECTION PRN
Status: CANCELLED | OUTPATIENT
Start: 2024-05-30

## 2024-05-30 RX ORDER — SODIUM CHLORIDE 9 MG/ML
INJECTION, SOLUTION INTRAVENOUS PRN
Status: CANCELLED | OUTPATIENT
Start: 2024-05-30

## 2024-05-30 RX ORDER — SODIUM CHLORIDE 0.9 % (FLUSH) 0.9 %
5-40 SYRINGE (ML) INJECTION EVERY 12 HOURS SCHEDULED
Status: CANCELLED | OUTPATIENT
Start: 2024-05-30

## 2024-05-30 NOTE — H&P
Tyler Franks (:  1965) is a 58 y.o. male,Established patient, here for evaluation of the following chief complaint(s):  Follow-up (2 week follow up with Dr. Tapia)                 SUBJECTIVE/OBJECTIVE:  Tyler has a history of peripheral vascular disease of the lower extremities.  He has had a wound. He works as a    Current treatment includes asa.  Tyler has had new wounds.   He is seeing Dr. Darby. Recently, he reports claudication at a distance of  short distance.  Tyler reports that the right leg is equal to the left.  He reports claudication is not changed and is mostly in the form of generalized weakness starting in the thighs and calves. He has a short recovery time. This is reproducible in nature. He reports ischemic rest pain 0 times per night.       I have personally reviewed the following: problem list, current meds, allergies, PMH, PSH, family hx, and social hx  Tyler Franks is a 58 y.o. male with the following history as recorded in [facility]:       Patient Active Problem List     Diagnosis Date Noted    PVD (peripheral vascular disease) ([facility]) 03/15/2024      Current Facility-Administered Medications          Current Outpatient Medications   Medication Sig Dispense Refill    HYDROcodone-acetaminophen (NORCO)  MG per tablet Take 1 tablet by mouth every 6 hours as needed for Pain. Max Daily Amount: 4 tablets        BAXDELA 450 MG TABS tablet TAKE 1 TABLET BY MOUTH TWICE DAILY FOR 14 DAYS        atorvastatin (LIPITOR) 40 MG tablet Take 1 tablet by mouth daily        losartan (COZAAR) 25 MG tablet Take 1 tablet by mouth daily        vitamin D (VITAMIN D3) 49429 UNIT CAPS Take 1 capsule by mouth daily        aspirin (SB LOW DOSE ASA EC) 81 MG EC tablet Take 1 tablet by mouth once for 1 dose 30 tablet 0      No current facility-administered medications for this visit.         Allergies: Patient has no known allergies.  Past Medical History        Past

## 2024-05-30 NOTE — H&P (VIEW-ONLY)
left great and second toes   Psychiatric - mood, affect, and behavior appear normal.  Judgment and thought processes appear normal.     Risk factors for atherosclerosis of all vascular beds have been reviewed with the patient including:  Family history, tobacco abuse in all forms, elevated cholesterol, hyperlipidemia, and diabetes.     Lower extremity arterial study: Right TATYANA 0.61, Left TATYANA 0.62.  Individual films reviewed: Yes.  These results were reviewed with the patient.  Disease process is chronic illness with exacerbation, progression, or side effects of treatment           Cta - 1.  Mild stenosis of the distal abdominal aorta.  2.  Complete occlusion of the right common iliac artery, right internal iliac artery and right external iliac artery.  3.  Complete occlusion of the distal right and left femoral artery.  Both the right and left distal-most femoral arteries reconstitute via collateral flow.  4.  Patent three-vessel runoff through the right and left ankle     Options have been discussed with the patient including continued medical management vs. proceeding with surgical intervention.  Patient has opted to proceed with surgery.  Risks have been discussed with the patient including but not limited to MI, death, CVA, bleed, nerve injury, renal failure and need for possible dialysis if contrast is used, and infection.       ASSESSMENT/PLAN:  1. PVD (peripheral vascular disease) ([facility])           Discussed management of other which includes:  continue asa to reduce risk of venous thrombosis and to reduce risk of arterial thrombosis  Strongly encourage continue statin therapy -   Recommend no smoking - discussed the effect tobacco has on illness;   Proceed with left to right fem fem bypass, with possible left iliac stenting , possible  femoral endarterectomy         An electronic signature was used to authenticate this note.     --Radha Tapia,

## 2024-06-03 ENCOUNTER — ANESTHESIA (OUTPATIENT)
Dept: OPERATING ROOM | Age: 59
End: 2024-06-03
Payer: MEDICAID

## 2024-06-03 ENCOUNTER — ANESTHESIA EVENT (OUTPATIENT)
Dept: OPERATING ROOM | Age: 59
End: 2024-06-03
Payer: MEDICAID

## 2024-06-03 ENCOUNTER — HOSPITAL ENCOUNTER (OUTPATIENT)
Age: 59
Setting detail: OUTPATIENT SURGERY
Discharge: HOME | End: 2024-06-03
Attending: SURGERY | Admitting: SURGERY
Payer: MEDICAID

## 2024-06-03 ENCOUNTER — APPOINTMENT (OUTPATIENT)
Dept: VASCULAR LAB | Age: 59
End: 2024-06-03
Attending: SURGERY
Payer: MEDICAID

## 2024-06-03 ENCOUNTER — PREP FOR PROCEDURE (OUTPATIENT)
Dept: VASCULAR SURGERY | Age: 59
End: 2024-06-03

## 2024-06-03 VITALS
DIASTOLIC BLOOD PRESSURE: 92 MMHG | HEIGHT: 66 IN | RESPIRATION RATE: 18 BRPM | HEART RATE: 63 BPM | SYSTOLIC BLOOD PRESSURE: 167 MMHG | OXYGEN SATURATION: 100 % | WEIGHT: 132 LBS | TEMPERATURE: 98.2 F | BODY MASS INDEX: 21.21 KG/M2

## 2024-06-03 DIAGNOSIS — I73.9 PVD (PERIPHERAL VASCULAR DISEASE) (HCC): Primary | ICD-10-CM

## 2024-06-03 DIAGNOSIS — I73.9 PERIPHERAL VASCULAR DISEASE, UNSPECIFIED (HCC): ICD-10-CM

## 2024-06-03 LAB
ABO/RH: NORMAL
ANION GAP SERPL CALCULATED.3IONS-SCNC: 12 MMOL/L (ref 7–19)
ANTIBODY SCREEN: NORMAL
BUN SERPL-MCNC: 39 MG/DL (ref 6–20)
CALCIUM SERPL-MCNC: 9.3 MG/DL (ref 8.6–10)
CHLORIDE SERPL-SCNC: 110 MMOL/L (ref 98–111)
CO2 SERPL-SCNC: 20 MMOL/L (ref 22–29)
CREAT SERPL-MCNC: 2.8 MG/DL (ref 0.5–1.2)
ECHO BSA: 1.67 M2
GLUCOSE SERPL-MCNC: 113 MG/DL (ref 74–109)
POTASSIUM SERPL-SCNC: 4.8 MMOL/L (ref 3.5–4.9)
SODIUM SERPL-SCNC: 142 MMOL/L (ref 136–145)
VAS LEFT AX A MID PSV: 73.3 CM/S
VAS LEFT BRACHIAL A DIST PSV: 55.8 CM/S
VAS LEFT BRACHIAL A MID PSV: 68.3 CM/S
VAS LEFT BRACHIAL A PROX PSV: 73.3 CM/S
VAS LEFT RADIAL A DIST PSV: 38.9 CM/S
VAS LEFT RADIAL A MID PSV: 36.7 CM/S
VAS LEFT RADIAL A PROX PSV: 41.7 CM/S
VAS LEFT SUBCLAVIAN DIST PSV: 64.1 CM/S
VAS LEFT SUBCLAVIAN PROX PSV: 58 CM/S
VAS LEFT ULNAR A DIST PSV: 25 CM/S
VAS LEFT ULNAR A MID PSV: 22.6 CM/S
VAS LEFT ULNAR A PROX PSV: 33.9 CM/S
VAS RIGHT AX A MID PSV: 65.4 CM/S
VAS RIGHT BRACHIAL A DIST PSV: 60.6 CM/S
VAS RIGHT BRACHIAL A MID PSV: 60.6 CM/S
VAS RIGHT BRACHIAL A PROX PSV: 62.3 CM/S
VAS RIGHT RADIAL A DIST PSV: 57.5 CM/S
VAS RIGHT RADIAL A MID PSV: 37.1 CM/S
VAS RIGHT RADIAL A PROX PSV: 37.8 CM/S
VAS RIGHT SUBCLAVIAN DIST PSV: 49.2 CM/S
VAS RIGHT SUBCLAVIAN PROX PSV: 58 CM/S
VAS RIGHT ULNAR A DIST PSV: 37.6 CM/S
VAS RIGHT ULNAR A MID PSV: 43.5 CM/S
VAS RIGHT ULNAR A PROX PSV: 54 CM/S

## 2024-06-03 PROCEDURE — 6370000000 HC RX 637 (ALT 250 FOR IP): Performed by: NURSE PRACTITIONER

## 2024-06-03 PROCEDURE — 86901 BLOOD TYPING SEROLOGIC RH(D): CPT

## 2024-06-03 PROCEDURE — 36415 COLL VENOUS BLD VENIPUNCTURE: CPT

## 2024-06-03 PROCEDURE — 7100000011 HC PHASE II RECOVERY - ADDTL 15 MIN: Performed by: SURGERY

## 2024-06-03 PROCEDURE — 86850 RBC ANTIBODY SCREEN: CPT

## 2024-06-03 PROCEDURE — 86900 BLOOD TYPING SEROLOGIC ABO: CPT

## 2024-06-03 PROCEDURE — 80048 BASIC METABOLIC PNL TOTAL CA: CPT

## 2024-06-03 PROCEDURE — 2580000003 HC RX 258: Performed by: ANESTHESIOLOGY

## 2024-06-03 PROCEDURE — 93930 UPPER EXTREMITY STUDY: CPT

## 2024-06-03 PROCEDURE — 7100000010 HC PHASE II RECOVERY - FIRST 15 MIN: Performed by: SURGERY

## 2024-06-03 RX ORDER — DIPHENHYDRAMINE HYDROCHLORIDE 50 MG/ML
12.5 INJECTION INTRAMUSCULAR; INTRAVENOUS
Status: CANCELLED | OUTPATIENT
Start: 2024-06-03 | End: 2024-06-04

## 2024-06-03 RX ORDER — SODIUM CHLORIDE 0.9 % (FLUSH) 0.9 %
5-40 SYRINGE (ML) INJECTION EVERY 12 HOURS SCHEDULED
Status: CANCELLED | OUTPATIENT
Start: 2024-06-03

## 2024-06-03 RX ORDER — ASPIRIN 81 MG/1
81 TABLET ORAL ONCE
Status: COMPLETED | OUTPATIENT
Start: 2024-06-03 | End: 2024-06-03

## 2024-06-03 RX ORDER — SODIUM CHLORIDE 9 MG/ML
INJECTION, SOLUTION INTRAVENOUS PRN
Status: DISCONTINUED | OUTPATIENT
Start: 2024-06-03 | End: 2024-06-03 | Stop reason: HOSPADM

## 2024-06-03 RX ORDER — HYDROMORPHONE HYDROCHLORIDE 1 MG/ML
0.5 INJECTION, SOLUTION INTRAMUSCULAR; INTRAVENOUS; SUBCUTANEOUS EVERY 5 MIN PRN
Status: CANCELLED | OUTPATIENT
Start: 2024-06-03

## 2024-06-03 RX ORDER — SODIUM CHLORIDE, SODIUM LACTATE, POTASSIUM CHLORIDE, CALCIUM CHLORIDE 600; 310; 30; 20 MG/100ML; MG/100ML; MG/100ML; MG/100ML
INJECTION, SOLUTION INTRAVENOUS CONTINUOUS
Status: DISCONTINUED | OUTPATIENT
Start: 2024-06-03 | End: 2024-06-03 | Stop reason: HOSPADM

## 2024-06-03 RX ORDER — NALOXONE HYDROCHLORIDE 0.4 MG/ML
INJECTION, SOLUTION INTRAMUSCULAR; INTRAVENOUS; SUBCUTANEOUS PRN
Status: CANCELLED | OUTPATIENT
Start: 2024-06-03

## 2024-06-03 RX ORDER — HYDROMORPHONE HYDROCHLORIDE 1 MG/ML
0.25 INJECTION, SOLUTION INTRAMUSCULAR; INTRAVENOUS; SUBCUTANEOUS EVERY 5 MIN PRN
Status: CANCELLED | OUTPATIENT
Start: 2024-06-03

## 2024-06-03 RX ORDER — SODIUM CHLORIDE 0.9 % (FLUSH) 0.9 %
5-40 SYRINGE (ML) INJECTION PRN
Status: CANCELLED | OUTPATIENT
Start: 2024-06-03

## 2024-06-03 RX ORDER — METOCLOPRAMIDE HYDROCHLORIDE 5 MG/ML
10 INJECTION INTRAMUSCULAR; INTRAVENOUS
Status: CANCELLED | OUTPATIENT
Start: 2024-06-03 | End: 2024-06-04

## 2024-06-03 RX ORDER — HYDRALAZINE HYDROCHLORIDE 20 MG/ML
10 INJECTION INTRAMUSCULAR; INTRAVENOUS
Status: CANCELLED | OUTPATIENT
Start: 2024-06-03

## 2024-06-03 RX ORDER — SODIUM CHLORIDE 9 MG/ML
INJECTION, SOLUTION INTRAVENOUS PRN
Status: CANCELLED | OUTPATIENT
Start: 2024-06-03

## 2024-06-03 RX ORDER — SODIUM CHLORIDE 0.9 % (FLUSH) 0.9 %
5-40 SYRINGE (ML) INJECTION EVERY 12 HOURS SCHEDULED
Status: DISCONTINUED | OUTPATIENT
Start: 2024-06-03 | End: 2024-06-03 | Stop reason: HOSPADM

## 2024-06-03 RX ORDER — IPRATROPIUM BROMIDE AND ALBUTEROL SULFATE 2.5; .5 MG/3ML; MG/3ML
1 SOLUTION RESPIRATORY (INHALATION)
Status: CANCELLED | OUTPATIENT
Start: 2024-06-03 | End: 2024-06-04

## 2024-06-03 RX ORDER — SODIUM CHLORIDE 0.9 % (FLUSH) 0.9 %
5-40 SYRINGE (ML) INJECTION PRN
Status: DISCONTINUED | OUTPATIENT
Start: 2024-06-03 | End: 2024-06-03 | Stop reason: HOSPADM

## 2024-06-03 RX ORDER — LABETALOL HYDROCHLORIDE 5 MG/ML
10 INJECTION, SOLUTION INTRAVENOUS
Status: CANCELLED | OUTPATIENT
Start: 2024-06-03

## 2024-06-03 RX ADMIN — ASPIRIN 81 MG: 81 TABLET, COATED ORAL at 09:11

## 2024-06-03 RX ADMIN — SODIUM CHLORIDE, SODIUM LACTATE, POTASSIUM CHLORIDE, AND CALCIUM CHLORIDE: 600; 310; 30; 20 INJECTION, SOLUTION INTRAVENOUS at 09:05

## 2024-06-03 ASSESSMENT — LIFESTYLE VARIABLES: SMOKING_STATUS: 0

## 2024-06-03 ASSESSMENT — PAIN - FUNCTIONAL ASSESSMENT
PAIN_FUNCTIONAL_ASSESSMENT: NONE - DENIES PAIN
PAIN_FUNCTIONAL_ASSESSMENT: 0-10
PAIN_FUNCTIONAL_ASSESSMENT: NONE - DENIES PAIN

## 2024-06-03 NOTE — ANESTHESIA PROCEDURE NOTES
Arterial Line:    An arterial line was placed using ultrasound guidance, in the holding area for the following indication(s): continuous blood pressure monitoring and blood sampling needed.    A 20 gauge (size), 4.45 cm (length), Arrow (type) catheter was placed, Seldinger technique used, into the right radial artery and 1% lidocaine 0.25 ml, secured by tape and Tegaderm and biopatch.  Anesthesia type: Local  Local infiltration: Injection    Events:  patient tolerated procedure well with no complications and EBL 0mL.6/3/2024 10:12 AM6/3/2024 10:14 AM  Anesthesiologist: Flory Marshall MD  Performed: Anesthesiologist   Preanesthetic Checklist  Completed: patient identified, IV checked, site marked, risks and benefits discussed, surgical/procedural consents, equipment checked, pre-op evaluation, timeout performed, anesthesia consent given, oxygen available, monitors applied/VS acknowledged, fire risk safety assessment completed and verbalized and blood product R/B/A discussed and consented

## 2024-06-03 NOTE — PROGRESS NOTES
Patient denies pain or any complaints. He is alert, oriented and able to make his needs known. VSS. Daughter and granddaughter at bedside.

## 2024-06-03 NOTE — DISCHARGE INSTRUCTIONS
No heavy lifting for 24hrs, limit use of right arm, leave bandage on for 24hrs,   If bleeding occurs hold pressure and go to ER.

## 2024-06-03 NOTE — ANESTHESIA PRE PROCEDURE
Component Value Date/Time     05/28/2024 01:15 PM    K 5.2 05/28/2024 01:15 PM     05/28/2024 01:15 PM    CO2 21 05/28/2024 01:15 PM    BUN 45 05/28/2024 01:15 PM    CREATININE 3.2 05/28/2024 01:15 PM    LABGLOM 21 05/28/2024 01:15 PM    LABGLOM 30 04/23/2024 08:36 AM    GLUCOSE 89 05/28/2024 01:15 PM    CALCIUM 9.7 05/28/2024 01:15 PM    BILITOT 0.3 05/19/2024 06:26 PM    ALKPHOS 88 05/19/2024 06:26 PM    AST 18 05/19/2024 06:26 PM    ALT 17 05/19/2024 06:26 PM       POC Tests: No results for input(s): \"POCGLU\", \"POCNA\", \"POCK\", \"POCCL\", \"POCBUN\", \"POCHEMO\", \"POCHCT\" in the last 72 hours.    Coags:   Lab Results   Component Value Date/Time    PROTIME 12.3 05/28/2024 01:15 PM    INR 0.94 05/28/2024 01:15 PM    APTT 26.5 05/28/2024 01:15 PM       HCG (If Applicable): No results found for: \"PREGTESTUR\", \"PREGSERUM\", \"HCG\", \"HCGQUANT\"     ABGs: No results found for: \"PHART\", \"PO2ART\", \"FJM9MHM\", \"MHA4UGU\", \"BEART\", \"Y7HLFMMV\"     Type & Screen (If Applicable):  No results found for: \"LABABO\"    Drug/Infectious Status (If Applicable):  No results found for: \"HIV\", \"HEPCAB\"    COVID-19 Screening (If Applicable): No results found for: \"COVID19\"        Anesthesia Evaluation  Patient summary reviewed   no history of anesthetic complications:   Airway: Mallampati: II  TM distance: >3 FB   Neck ROM: full  Mouth opening: > = 3 FB   Dental: normal exam         Pulmonary: breath sounds clear to auscultation      (-) COPD, asthma, sleep apnea and not a current smoker                           Cardiovascular:  Exercise tolerance: Leg pain limits activity  (+) hypertension:, CAD: non-obstructive, CHF (ef 45): systolic, hyperlipidemia    (-) pacemaker, CABG/stent and dysrhythmias    ECG reviewed  Rhythm: regular  Rate: normal  Echocardiogram reviewed    Cleared by cardiology     Beta Blocker:  Not on Beta Blocker         Neuro/Psych:      (-) seizures and CVA           GI/Hepatic/Renal:   (+) renal disease: CRI     (-)

## 2024-06-03 NOTE — PROGRESS NOTES
Patient just back to room per stretcher. Family has taken lunch and will return shortly. Report received from Jeanie LYNCH. Dressing to artline intact. VSS.

## 2024-06-10 ENCOUNTER — ANESTHESIA (OUTPATIENT)
Dept: OPERATING ROOM | Age: 59
End: 2024-06-10
Payer: MEDICAID

## 2024-06-10 ENCOUNTER — HOSPITAL ENCOUNTER (INPATIENT)
Age: 59
LOS: 6 days | Discharge: HOME | DRG: 253 | End: 2024-06-16
Attending: SURGERY | Admitting: SURGERY
Payer: MEDICAID

## 2024-06-10 ENCOUNTER — ANESTHESIA EVENT (OUTPATIENT)
Dept: OPERATING ROOM | Age: 59
End: 2024-06-10
Payer: MEDICAID

## 2024-06-10 DIAGNOSIS — I73.9 PAD (PERIPHERAL ARTERY DISEASE) (HCC): Primary | ICD-10-CM

## 2024-06-10 DIAGNOSIS — I73.9 PERIPHERAL VASCULAR DISEASE, UNSPECIFIED (HCC): ICD-10-CM

## 2024-06-10 LAB
ABO/RH: NORMAL
ALLENS TEST: ABNORMAL
ANTIBODY SCREEN: NORMAL
BASE EXCESS ARTERIAL: -5.5 MMOL/L (ref -2–2)
BASE EXCESS ARTERIAL: -5.6 MMOL/L (ref -2–2)
CARBOXYHEMOGLOBIN ARTERIAL: 2.6 % (ref 0–5)
CARBOXYHEMOGLOBIN ARTERIAL: 2.7 % (ref 0–5)
ERYTHROCYTE [DISTWIDTH] IN BLOOD BY AUTOMATED COUNT: 13.3 % (ref 11.5–14.5)
ERYTHROCYTE [DISTWIDTH] IN BLOOD BY AUTOMATED COUNT: 13.3 % (ref 11.5–14.5)
HCO3 ARTERIAL: 20.7 MMOL/L (ref 22–26)
HCO3 ARTERIAL: 21.9 MMOL/L (ref 22–26)
HCT VFR BLD AUTO: 38 % (ref 42–52)
HCT VFR BLD AUTO: 42.5 % (ref 42–52)
HEMOGLOBIN, ART, EXTENDED: 11.7 G/DL (ref 14–18)
HEMOGLOBIN, ART, EXTENDED: 9.7 G/DL (ref 14–18)
HGB BLD-MCNC: 11.9 G/DL (ref 14–18)
HGB BLD-MCNC: 13.8 G/DL (ref 14–18)
MCH RBC QN AUTO: 30.8 PG (ref 27–31)
MCH RBC QN AUTO: 30.9 PG (ref 27–31)
MCHC RBC AUTO-ENTMCNC: 31.3 G/DL (ref 33–37)
MCHC RBC AUTO-ENTMCNC: 32.5 G/DL (ref 33–37)
MCV RBC AUTO: 95.3 FL (ref 80–94)
MCV RBC AUTO: 98.4 FL (ref 80–94)
METHEMOGLOBIN ARTERIAL: 0.9 %
METHEMOGLOBIN ARTERIAL: 1.2 %
O2 CONTENT ARTERIAL: 13.7 ML/DL
O2 CONTENT ARTERIAL: 16.4 ML/DL
O2 SAT, ARTERIAL: 96.3 %
O2 SAT, ARTERIAL: 96.9 %
O2 THERAPY: ABNORMAL
O2 THERAPY: ABNORMAL
PCO2 ARTERIAL: 39 MMHG (ref 35–45)
PCO2 ARTERIAL: 50 MMHG (ref 35–45)
PH ARTERIAL: 7.25 (ref 7.35–7.45)
PH ARTERIAL: 7.32 (ref 7.35–7.45)
PLATELET # BLD AUTO: 134 K/UL (ref 130–400)
PLATELET # BLD AUTO: 146 K/UL (ref 130–400)
PMV BLD AUTO: 11.5 FL (ref 9.4–12.4)
PMV BLD AUTO: 11.6 FL (ref 9.4–12.4)
PO2 ARTERIAL: 189 MMHG (ref 80–100)
PO2 ARTERIAL: 230 MMHG (ref 80–100)
POTASSIUM BLD-SCNC: 6.3 MMOL/L
POTASSIUM BLD-SCNC: 7.1 MMOL/L
RBC # BLD AUTO: 3.86 M/UL (ref 4.7–6.1)
RBC # BLD AUTO: 4.46 M/UL (ref 4.7–6.1)
SAMPLE SOURCE: ABNORMAL
WBC # BLD AUTO: 10.1 K/UL (ref 4.8–10.8)
WBC # BLD AUTO: 13.1 K/UL (ref 4.8–10.8)

## 2024-06-10 PROCEDURE — A4217 STERILE WATER/SALINE, 500 ML: HCPCS | Performed by: SURGERY

## 2024-06-10 PROCEDURE — 2580000003 HC RX 258: Performed by: NURSE ANESTHETIST, CERTIFIED REGISTERED

## 2024-06-10 PROCEDURE — 1200000000 HC SEMI PRIVATE

## 2024-06-10 PROCEDURE — C1887 CATHETER, GUIDING: HCPCS | Performed by: SURGERY

## 2024-06-10 PROCEDURE — 6360000002 HC RX W HCPCS: Performed by: NURSE ANESTHETIST, CERTIFIED REGISTERED

## 2024-06-10 PROCEDURE — 6360000002 HC RX W HCPCS: Performed by: SURGERY

## 2024-06-10 PROCEDURE — 3700000001 HC ADD 15 MINUTES (ANESTHESIA): Performed by: SURGERY

## 2024-06-10 PROCEDURE — 82803 BLOOD GASES ANY COMBINATION: CPT

## 2024-06-10 PROCEDURE — 7100000000 HC PACU RECOVERY - FIRST 15 MIN: Performed by: SURGERY

## 2024-06-10 PROCEDURE — 2580000003 HC RX 258: Performed by: SURGERY

## 2024-06-10 PROCEDURE — 86901 BLOOD TYPING SEROLOGIC RH(D): CPT

## 2024-06-10 PROCEDURE — 3600000017 HC SURGERY HYBRID ADDL 15MIN: Performed by: SURGERY

## 2024-06-10 PROCEDURE — 37799 UNLISTED PX VASCULAR SURGERY: CPT

## 2024-06-10 PROCEDURE — 3600000007 HC SURGERY HYBRID BASE: Performed by: SURGERY

## 2024-06-10 PROCEDURE — 2709999900 HC NON-CHARGEABLE SUPPLY: Performed by: SURGERY

## 2024-06-10 PROCEDURE — C1889 IMPLANT/INSERT DEVICE, NOC: HCPCS | Performed by: SURGERY

## 2024-06-10 PROCEDURE — 2720000010 HC SURG SUPPLY STERILE: Performed by: SURGERY

## 2024-06-10 PROCEDURE — 7100000001 HC PACU RECOVERY - ADDTL 15 MIN: Performed by: SURGERY

## 2024-06-10 PROCEDURE — 85027 COMPLETE CBC AUTOMATED: CPT

## 2024-06-10 PROCEDURE — 35654 BPG AXILLARY-FEMORAL-FEMORAL: CPT | Performed by: SURGERY

## 2024-06-10 PROCEDURE — 36600 WITHDRAWAL OF ARTERIAL BLOOD: CPT

## 2024-06-10 PROCEDURE — 36415 COLL VENOUS BLD VENIPUNCTURE: CPT

## 2024-06-10 PROCEDURE — P9045 ALBUMIN (HUMAN), 5%, 250 ML: HCPCS | Performed by: NURSE ANESTHETIST, CERTIFIED REGISTERED

## 2024-06-10 PROCEDURE — 94150 VITAL CAPACITY TEST: CPT

## 2024-06-10 PROCEDURE — 2500000003 HC RX 250 WO HCPCS: Performed by: NURSE ANESTHETIST, CERTIFIED REGISTERED

## 2024-06-10 PROCEDURE — 2700000000 HC OXYGEN THERAPY PER DAY

## 2024-06-10 PROCEDURE — 86850 RBC ANTIBODY SCREEN: CPT

## 2024-06-10 PROCEDURE — 86900 BLOOD TYPING SEROLOGIC ABO: CPT

## 2024-06-10 PROCEDURE — 6370000000 HC RX 637 (ALT 250 FOR IP): Performed by: SURGERY

## 2024-06-10 PROCEDURE — 3700000000 HC ANESTHESIA ATTENDED CARE: Performed by: SURGERY

## 2024-06-10 PROCEDURE — 041L0ZH BYPASS LEFT FEMORAL ARTERY TO RIGHT FEMORAL ARTERY, OPEN APPROACH: ICD-10-PCS | Performed by: SURGERY

## 2024-06-10 PROCEDURE — C1768 GRAFT, VASCULAR: HCPCS | Performed by: SURGERY

## 2024-06-10 PROCEDURE — 94760 N-INVAS EAR/PLS OXIMETRY 1: CPT

## 2024-06-10 DEVICE — CLIP LIG M BLU TI HRT SHP WIRE HORZ 6 CLIPS PER PK: Type: IMPLANTABLE DEVICE | Status: FUNCTIONAL

## 2024-06-10 DEVICE — IMPLANTABLE DEVICE: Type: IMPLANTABLE DEVICE | Status: FUNCTIONAL

## 2024-06-10 DEVICE — GRAFT VASC L90X40CM DIA8X8MM AXILLOBIFEMORAL PTFE CBAS HEP: Type: IMPLANTABLE DEVICE | Status: FUNCTIONAL

## 2024-06-10 RX ORDER — ONDANSETRON 2 MG/ML
INJECTION INTRAMUSCULAR; INTRAVENOUS PRN
Status: DISCONTINUED | OUTPATIENT
Start: 2024-06-10 | End: 2024-06-10 | Stop reason: SDUPTHER

## 2024-06-10 RX ORDER — SODIUM CHLORIDE 9 MG/ML
INJECTION, SOLUTION INTRAVENOUS PRN
Status: DISCONTINUED | OUTPATIENT
Start: 2024-06-10 | End: 2024-06-10 | Stop reason: HOSPADM

## 2024-06-10 RX ORDER — HYDROMORPHONE HYDROCHLORIDE 1 MG/ML
0.25 INJECTION, SOLUTION INTRAMUSCULAR; INTRAVENOUS; SUBCUTANEOUS EVERY 5 MIN PRN
Status: DISCONTINUED | OUTPATIENT
Start: 2024-06-10 | End: 2024-06-10 | Stop reason: HOSPADM

## 2024-06-10 RX ORDER — NALOXONE HYDROCHLORIDE 0.4 MG/ML
INJECTION, SOLUTION INTRAMUSCULAR; INTRAVENOUS; SUBCUTANEOUS PRN
Status: DISCONTINUED | OUTPATIENT
Start: 2024-06-10 | End: 2024-06-10 | Stop reason: HOSPADM

## 2024-06-10 RX ORDER — ALBUMIN, HUMAN INJ 5% 5 %
SOLUTION INTRAVENOUS PRN
Status: DISCONTINUED | OUTPATIENT
Start: 2024-06-10 | End: 2024-06-10 | Stop reason: SDUPTHER

## 2024-06-10 RX ORDER — SODIUM CHLORIDE 0.9 % (FLUSH) 0.9 %
5-40 SYRINGE (ML) INJECTION PRN
Status: DISCONTINUED | OUTPATIENT
Start: 2024-06-10 | End: 2024-06-10 | Stop reason: HOSPADM

## 2024-06-10 RX ORDER — ASPIRIN 81 MG/1
81 TABLET ORAL ONCE
Status: DISCONTINUED | OUTPATIENT
Start: 2024-06-10 | End: 2024-06-16 | Stop reason: HOSPADM

## 2024-06-10 RX ORDER — PROPOFOL 10 MG/ML
INJECTION, EMULSION INTRAVENOUS PRN
Status: DISCONTINUED | OUTPATIENT
Start: 2024-06-10 | End: 2024-06-10 | Stop reason: SDUPTHER

## 2024-06-10 RX ORDER — HYDROMORPHONE HYDROCHLORIDE 1 MG/ML
0.5 INJECTION, SOLUTION INTRAMUSCULAR; INTRAVENOUS; SUBCUTANEOUS EVERY 5 MIN PRN
Status: DISCONTINUED | OUTPATIENT
Start: 2024-06-10 | End: 2024-06-10 | Stop reason: HOSPADM

## 2024-06-10 RX ORDER — SODIUM CHLORIDE 0.9 % (FLUSH) 0.9 %
5-40 SYRINGE (ML) INJECTION EVERY 12 HOURS SCHEDULED
Status: DISCONTINUED | OUTPATIENT
Start: 2024-06-10 | End: 2024-06-10 | Stop reason: HOSPADM

## 2024-06-10 RX ORDER — LOSARTAN POTASSIUM 50 MG/1
25 TABLET ORAL DAILY
Status: DISCONTINUED | OUTPATIENT
Start: 2024-06-11 | End: 2024-06-16 | Stop reason: HOSPADM

## 2024-06-10 RX ORDER — MIDAZOLAM HYDROCHLORIDE 1 MG/ML
INJECTION INTRAMUSCULAR; INTRAVENOUS PRN
Status: DISCONTINUED | OUTPATIENT
Start: 2024-06-10 | End: 2024-06-10 | Stop reason: SDUPTHER

## 2024-06-10 RX ORDER — SODIUM CHLORIDE 0.9 % (FLUSH) 0.9 %
5-40 SYRINGE (ML) INJECTION EVERY 12 HOURS SCHEDULED
Status: DISCONTINUED | OUTPATIENT
Start: 2024-06-10 | End: 2024-06-14 | Stop reason: SDUPTHER

## 2024-06-10 RX ORDER — FAMOTIDINE 20 MG/1
20 TABLET, FILM COATED ORAL ONCE
Status: DISCONTINUED | OUTPATIENT
Start: 2024-06-10 | End: 2024-06-10 | Stop reason: HOSPADM

## 2024-06-10 RX ORDER — FINASTERIDE 5 MG/1
5 TABLET, FILM COATED ORAL DAILY
Status: DISCONTINUED | OUTPATIENT
Start: 2024-06-11 | End: 2024-06-16 | Stop reason: HOSPADM

## 2024-06-10 RX ORDER — PROTAMINE SULFATE 10 MG/ML
INJECTION, SOLUTION INTRAVENOUS PRN
Status: DISCONTINUED | OUTPATIENT
Start: 2024-06-10 | End: 2024-06-10 | Stop reason: SDUPTHER

## 2024-06-10 RX ORDER — LIDOCAINE HYDROCHLORIDE 10 MG/ML
INJECTION, SOLUTION EPIDURAL; INFILTRATION; INTRACAUDAL; PERINEURAL PRN
Status: DISCONTINUED | OUTPATIENT
Start: 2024-06-10 | End: 2024-06-10 | Stop reason: SDUPTHER

## 2024-06-10 RX ORDER — LABETALOL 20 MG/4 ML (5 MG/ML) INTRAVENOUS SYRINGE
PRN
Status: DISCONTINUED | OUTPATIENT
Start: 2024-06-10 | End: 2024-06-10 | Stop reason: SDUPTHER

## 2024-06-10 RX ORDER — LIDOCAINE HYDROCHLORIDE 10 MG/ML
1 INJECTION, SOLUTION EPIDURAL; INFILTRATION; INTRACAUDAL; PERINEURAL
Status: DISCONTINUED | OUTPATIENT
Start: 2024-06-10 | End: 2024-06-10 | Stop reason: HOSPADM

## 2024-06-10 RX ORDER — HEPARIN SODIUM 1000 [USP'U]/ML
INJECTION, SOLUTION INTRAVENOUS; SUBCUTANEOUS PRN
Status: DISCONTINUED | OUTPATIENT
Start: 2024-06-10 | End: 2024-06-10 | Stop reason: SDUPTHER

## 2024-06-10 RX ORDER — SCOLOPAMINE TRANSDERMAL SYSTEM 1 MG/1
1 PATCH, EXTENDED RELEASE TRANSDERMAL
Status: DISCONTINUED | OUTPATIENT
Start: 2024-06-10 | End: 2024-06-10 | Stop reason: HOSPADM

## 2024-06-10 RX ORDER — NITROGLYCERIN 20 MG/100ML
INJECTION INTRAVENOUS PRN
Status: DISCONTINUED | OUTPATIENT
Start: 2024-06-10 | End: 2024-06-10

## 2024-06-10 RX ORDER — DIPHENHYDRAMINE HYDROCHLORIDE 50 MG/ML
12.5 INJECTION INTRAMUSCULAR; INTRAVENOUS
Status: DISCONTINUED | OUTPATIENT
Start: 2024-06-10 | End: 2024-06-10 | Stop reason: HOSPADM

## 2024-06-10 RX ORDER — SODIUM CHLORIDE 9 MG/ML
INJECTION, SOLUTION INTRAVENOUS CONTINUOUS PRN
Status: DISCONTINUED | OUTPATIENT
Start: 2024-06-10 | End: 2024-06-10 | Stop reason: SDUPTHER

## 2024-06-10 RX ORDER — CEFAZOLIN SODIUM 1 G/3ML
INJECTION, POWDER, FOR SOLUTION INTRAMUSCULAR; INTRAVENOUS PRN
Status: DISCONTINUED | OUTPATIENT
Start: 2024-06-10 | End: 2024-06-10 | Stop reason: SDUPTHER

## 2024-06-10 RX ORDER — SODIUM CHLORIDE 0.9 % (FLUSH) 0.9 %
5-40 SYRINGE (ML) INJECTION PRN
Status: DISCONTINUED | OUTPATIENT
Start: 2024-06-10 | End: 2024-06-14 | Stop reason: SDUPTHER

## 2024-06-10 RX ORDER — METOCLOPRAMIDE HYDROCHLORIDE 5 MG/ML
10 INJECTION INTRAMUSCULAR; INTRAVENOUS
Status: DISCONTINUED | OUTPATIENT
Start: 2024-06-10 | End: 2024-06-10 | Stop reason: HOSPADM

## 2024-06-10 RX ORDER — NITROGLYCERIN 20 MG/100ML
INJECTION INTRAVENOUS CONTINUOUS PRN
Status: DISCONTINUED | OUTPATIENT
Start: 2024-06-10 | End: 2024-06-10 | Stop reason: SDUPTHER

## 2024-06-10 RX ORDER — MIDAZOLAM HYDROCHLORIDE 2 MG/2ML
2 INJECTION, SOLUTION INTRAMUSCULAR; INTRAVENOUS
Status: DISCONTINUED | OUTPATIENT
Start: 2024-06-10 | End: 2024-06-10 | Stop reason: HOSPADM

## 2024-06-10 RX ORDER — HYDROCODONE BITARTRATE AND ACETAMINOPHEN 10; 325 MG/1; MG/1
1 TABLET ORAL EVERY 6 HOURS PRN
Status: DISCONTINUED | OUTPATIENT
Start: 2024-06-10 | End: 2024-06-12

## 2024-06-10 RX ORDER — SODIUM CHLORIDE 9 MG/ML
INJECTION, SOLUTION INTRAVENOUS PRN
Status: DISCONTINUED | OUTPATIENT
Start: 2024-06-10 | End: 2024-06-14 | Stop reason: SDUPTHER

## 2024-06-10 RX ORDER — SODIUM CHLORIDE, SODIUM LACTATE, POTASSIUM CHLORIDE, CALCIUM CHLORIDE 600; 310; 30; 20 MG/100ML; MG/100ML; MG/100ML; MG/100ML
INJECTION, SOLUTION INTRAVENOUS CONTINUOUS PRN
Status: DISCONTINUED | OUTPATIENT
Start: 2024-06-10 | End: 2024-06-10

## 2024-06-10 RX ORDER — SODIUM CHLORIDE 9 MG/ML
INJECTION, SOLUTION INTRAVENOUS CONTINUOUS
Status: DISCONTINUED | OUTPATIENT
Start: 2024-06-10 | End: 2024-06-12

## 2024-06-10 RX ORDER — ATORVASTATIN CALCIUM 40 MG/1
40 TABLET, FILM COATED ORAL DAILY
Status: DISCONTINUED | OUTPATIENT
Start: 2024-06-11 | End: 2024-06-16 | Stop reason: HOSPADM

## 2024-06-10 RX ORDER — FENTANYL CITRATE 50 UG/ML
INJECTION, SOLUTION INTRAMUSCULAR; INTRAVENOUS PRN
Status: DISCONTINUED | OUTPATIENT
Start: 2024-06-10 | End: 2024-06-10 | Stop reason: SDUPTHER

## 2024-06-10 RX ORDER — ROCURONIUM BROMIDE 10 MG/ML
INJECTION, SOLUTION INTRAVENOUS PRN
Status: DISCONTINUED | OUTPATIENT
Start: 2024-06-10 | End: 2024-06-10 | Stop reason: SDUPTHER

## 2024-06-10 RX ADMIN — ROCURONIUM BROMIDE 30 MG: 10 INJECTION, SOLUTION INTRAVENOUS at 08:46

## 2024-06-10 RX ADMIN — ROCURONIUM BROMIDE 20 MG: 10 INJECTION, SOLUTION INTRAVENOUS at 10:14

## 2024-06-10 RX ADMIN — ONDANSETRON 4 MG: 2 INJECTION INTRAMUSCULAR; INTRAVENOUS at 14:36

## 2024-06-10 RX ADMIN — LABETALOL 20 MG/4 ML (5 MG/ML) INTRAVENOUS SYRINGE 10 MG: at 13:12

## 2024-06-10 RX ADMIN — ALBUMIN (HUMAN) 12.5 G: 12.5 INJECTION, SOLUTION INTRAVENOUS at 12:17

## 2024-06-10 RX ADMIN — FENTANYL CITRATE 100 MCG: 0.05 INJECTION, SOLUTION INTRAMUSCULAR; INTRAVENOUS at 08:15

## 2024-06-10 RX ADMIN — PROPOFOL 100 MG: 10 INJECTION, EMULSION INTRAVENOUS at 08:15

## 2024-06-10 RX ADMIN — LABETALOL 20 MG/4 ML (5 MG/ML) INTRAVENOUS SYRINGE 10 MG: at 09:22

## 2024-06-10 RX ADMIN — ALBUMIN (HUMAN) 12.5 G: 12.5 INJECTION, SOLUTION INTRAVENOUS at 13:11

## 2024-06-10 RX ADMIN — HEPARIN SODIUM 4000 UNITS: 1000 INJECTION, SOLUTION INTRAVENOUS; SUBCUTANEOUS at 10:55

## 2024-06-10 RX ADMIN — SUGAMMADEX 300 MG: 100 INJECTION, SOLUTION INTRAVENOUS at 15:00

## 2024-06-10 RX ADMIN — HEPARIN SODIUM 1000 UNITS: 1000 INJECTION, SOLUTION INTRAVENOUS; SUBCUTANEOUS at 13:21

## 2024-06-10 RX ADMIN — CEFAZOLIN 2 G: 1 INJECTION, POWDER, FOR SOLUTION INTRAMUSCULAR; INTRAVENOUS at 12:17

## 2024-06-10 RX ADMIN — SODIUM CHLORIDE: 9 INJECTION, SOLUTION INTRAVENOUS at 08:12

## 2024-06-10 RX ADMIN — SODIUM CHLORIDE: 9 INJECTION, SOLUTION INTRAVENOUS at 18:22

## 2024-06-10 RX ADMIN — PROTAMINE SULFATE 30 MG: 10 INJECTION, SOLUTION INTRAVENOUS at 14:26

## 2024-06-10 RX ADMIN — NITROGLYCERIN 20 MCG/MIN: 20 INJECTION INTRAVENOUS at 09:05

## 2024-06-10 RX ADMIN — HEPARIN SODIUM 1000 UNITS: 1000 INJECTION, SOLUTION INTRAVENOUS; SUBCUTANEOUS at 12:37

## 2024-06-10 RX ADMIN — HYDROMORPHONE HYDROCHLORIDE 1 MG: 1 INJECTION, SOLUTION INTRAMUSCULAR; INTRAVENOUS; SUBCUTANEOUS at 14:28

## 2024-06-10 RX ADMIN — ROCURONIUM BROMIDE 50 MG: 10 INJECTION, SOLUTION INTRAVENOUS at 08:15

## 2024-06-10 RX ADMIN — HEPARIN SODIUM 1000 UNITS: 1000 INJECTION, SOLUTION INTRAVENOUS; SUBCUTANEOUS at 14:08

## 2024-06-10 RX ADMIN — HYDROCODONE BITARTRATE AND ACETAMINOPHEN 1 TABLET: 10; 325 TABLET ORAL at 18:24

## 2024-06-10 RX ADMIN — SODIUM BICARBONATE 50 MEQ: 84 INJECTION, SOLUTION INTRAVENOUS at 13:59

## 2024-06-10 RX ADMIN — ROCURONIUM BROMIDE 30 MG: 10 INJECTION, SOLUTION INTRAVENOUS at 09:41

## 2024-06-10 RX ADMIN — ROCURONIUM BROMIDE 30 MG: 10 INJECTION, SOLUTION INTRAVENOUS at 11:58

## 2024-06-10 RX ADMIN — MIDAZOLAM 2 MG: 1 INJECTION INTRAMUSCULAR; INTRAVENOUS at 08:12

## 2024-06-10 RX ADMIN — FENTANYL CITRATE 100 MCG: 0.05 INJECTION, SOLUTION INTRAMUSCULAR; INTRAVENOUS at 08:57

## 2024-06-10 RX ADMIN — FENTANYL CITRATE 50 MCG: 0.05 INJECTION, SOLUTION INTRAMUSCULAR; INTRAVENOUS at 08:42

## 2024-06-10 RX ADMIN — LABETALOL 20 MG/4 ML (5 MG/ML) INTRAVENOUS SYRINGE 10 MG: at 15:14

## 2024-06-10 RX ADMIN — CEFAZOLIN 2 G: 1 INJECTION, POWDER, FOR SOLUTION INTRAMUSCULAR; INTRAVENOUS at 08:27

## 2024-06-10 RX ADMIN — LIDOCAINE HYDROCHLORIDE 50 MG: 10 INJECTION, SOLUTION EPIDURAL; INFILTRATION; INTRACAUDAL; PERINEURAL at 08:15

## 2024-06-10 RX ADMIN — HEPARIN SODIUM 1000 UNITS: 1000 INJECTION, SOLUTION INTRAVENOUS; SUBCUTANEOUS at 11:46

## 2024-06-10 SDOH — ECONOMIC STABILITY: FOOD INSECURITY: WITHIN THE PAST 12 MONTHS, YOU WORRIED THAT YOUR FOOD WOULD RUN OUT BEFORE YOU GOT MONEY TO BUY MORE.: NEVER TRUE

## 2024-06-10 SDOH — ECONOMIC STABILITY: INCOME INSECURITY: HOW HARD IS IT FOR YOU TO PAY FOR THE VERY BASICS LIKE FOOD, HOUSING, MEDICAL CARE, AND HEATING?: NOT HARD AT ALL

## 2024-06-10 SDOH — ECONOMIC STABILITY: INCOME INSECURITY: IN THE PAST 12 MONTHS, HAS THE ELECTRIC, GAS, OIL, OR WATER COMPANY THREATENED TO SHUT OFF SERVICE IN YOUR HOME?: NO

## 2024-06-10 ASSESSMENT — PAIN SCALES - GENERAL
PAINLEVEL_OUTOF10: 5
PAINLEVEL_OUTOF10: 0

## 2024-06-10 ASSESSMENT — PATIENT HEALTH QUESTIONNAIRE - PHQ9
SUM OF ALL RESPONSES TO PHQ QUESTIONS 1-9: 0
SUM OF ALL RESPONSES TO PHQ QUESTIONS 1-9: 0
SUM OF ALL RESPONSES TO PHQ9 QUESTIONS 1 & 2: 0
SUM OF ALL RESPONSES TO PHQ QUESTIONS 1-9: 0
SUM OF ALL RESPONSES TO PHQ QUESTIONS 1-9: 0
2. FEELING DOWN, DEPRESSED OR HOPELESS: NOT AT ALL
1. LITTLE INTEREST OR PLEASURE IN DOING THINGS: NOT AT ALL

## 2024-06-10 ASSESSMENT — PAIN DESCRIPTION - FREQUENCY: FREQUENCY: CONTINUOUS

## 2024-06-10 ASSESSMENT — PAIN DESCRIPTION - LOCATION: LOCATION: SHOULDER

## 2024-06-10 ASSESSMENT — PAIN - FUNCTIONAL ASSESSMENT
PAIN_FUNCTIONAL_ASSESSMENT: PREVENTS OR INTERFERES WITH MANY ACTIVE NOT PASSIVE ACTIVITIES
PAIN_FUNCTIONAL_ASSESSMENT: 0-10

## 2024-06-10 ASSESSMENT — PAIN DESCRIPTION - PAIN TYPE: TYPE: SURGICAL PAIN

## 2024-06-10 ASSESSMENT — PAIN DESCRIPTION - ONSET: ONSET: ON-GOING

## 2024-06-10 ASSESSMENT — LIFESTYLE VARIABLES: SMOKING_STATUS: 0

## 2024-06-10 ASSESSMENT — PAIN DESCRIPTION - DESCRIPTORS: DESCRIPTORS: ACHING

## 2024-06-10 ASSESSMENT — PAIN DESCRIPTION - ORIENTATION: ORIENTATION: RIGHT

## 2024-06-10 NOTE — OP NOTE
Operative Note      Patient: Tyler Franks  YOB: 1965  MRN: [id number]    Date of Procedure: 6/10/2024    Pre-Op Diagnosis Codes:     * Peripheral vascular disease, unspecified (HCC) [I73.9]  Aortoiliac occlusive disease    Post-Op Diagnosis: Same       Procedure(s):  AXILLARY BI-FEMORAL BYPASS    Surgeon(s):  Radha Tapia DO    Assistant:   First Assistant: Tonie Mcqueen Julie    Anesthesia: General    Estimated Blood Loss (mL): 800    Complications: None    Specimens:   * No specimens in log *    Implants:  Implant Name Type Inv. Item Serial No.  Lot No. LRB No. Used Action   CLIP INT WECK SM WIDE RED TI TRNSVRS GRV CHEVRON SHP W/ PERCIS TIP 6 PER PK - [id number]  CLIP INT WECK SM WIDE RED TI TRNSVRS GRV CHEVRON SHP W/ PERCIS TIP 6 PER PK  [employer]WD   4 Implanted   CLIP LIG M PAULY TI HRT SHP WIRE HORZ 6 CLIPS PER PK - [id number]  CLIP LIG M PAULY TI HRT SHP WIRE HORZ 6 CLIPS PER PK  [employer]WD   4 Implanted   GRAFT VASC [id number] AXILLOBIFEMORAL PTFE CBAS HEP - [id number] Vascular grafts GRAFT VASC [id number] AXILLOBIFEMORAL PTFE CBAS HEP [id number] [employer]-WD   1 Implanted         Drains:   Urinary Catheter 06/10/24 Dunn (Active)   Catheter Indications Prolonged immobilization (e.g. unstable thoracic or lumbar spine, multiple traumatic injuries such as pelvic fractures) 06/10/24 1527   Site Assessment No urethral drainage 06/10/24 1527   Urine Color Yellow 06/10/24 1527   Urine Appearance Clear 06/10/24 1527   Collection Container Standard 06/10/24 1527   Status Draining 06/10/24 1527           Detailed Description of Procedure:   Patient was brought to the operating room and placed supine position.  General esthesia was applied by digital provider and preoperative antibiotics were given.  The patient's right shoulder, chest, bilateral groins and lower extremities and satiety were prepped and draped in sterile

## 2024-06-10 NOTE — ANESTHESIA PRE PROCEDURE
Tests: No results for input(s): \"POCGLU\", \"POCNA\", \"POCK\", \"POCCL\", \"POCBUN\", \"POCHEMO\", \"POCHCT\" in the last 72 hours.    Coags:   Lab Results   Component Value Date/Time    PROTIME 12.3 05/28/2024 01:15 PM    INR 0.94 05/28/2024 01:15 PM    APTT 26.5 05/28/2024 01:15 PM       HCG (If Applicable): No results found for: \"PREGTESTUR\", \"PREGSERUM\", \"HCG\", \"HCGQUANT\"     ABGs: No results found for: \"PHART\", \"PO2ART\", \"OXS3QKT\", \"UVM6MWQ\", \"BEART\", \"Z2OCSFGU\"     Type & Screen (If Applicable):  No results found for: \"LABABO\"    Drug/Infectious Status (If Applicable):  No results found for: \"HIV\", \"HEPCAB\"    COVID-19 Screening (If Applicable): No results found for: \"COVID19\"        Anesthesia Evaluation  Patient summary reviewed   no history of anesthetic complications:   Airway: Mallampati: II  TM distance: >3 FB   Neck ROM: full  Mouth opening: > = 3 FB   Dental: normal exam         Pulmonary: breath sounds clear to auscultation      (-) COPD, asthma, sleep apnea and not a current smoker                           Cardiovascular:  Exercise tolerance: Leg pain limits activity  (+) hypertension:, CAD: non-obstructive, CHF (ef 45): systolic, hyperlipidemia    (-) pacemaker, CABG/stent and dysrhythmias    ECG reviewed  Rhythm: regular  Rate: normal  Echocardiogram reviewed    Cleared by cardiology     Beta Blocker:  Not on Beta Blocker         Neuro/Psych:      (-) seizures and CVA           GI/Hepatic/Renal:   (+) renal disease: CRI     (-) GERD and liver disease       Endo/Other:        (-) diabetes mellitus, hypothyroidism, hyperthyroidism               Abdominal:             Vascular:   + PVD, aortic or cerebral.       Other Findings:           Anesthesia Plan      general     ASA 3       Induction: intravenous.  arterial line  MIPS: Postoperative opioids intended and Prophylactic antiemetics administered.  Anesthetic plan and risks discussed with patient.    Use of blood products discussed with patient whom consented

## 2024-06-10 NOTE — ANESTHESIA POSTPROCEDURE EVALUATION
Department of Anesthesiology  Postprocedure Note    Patient: Tyler Franks  MRN: [id number]  YOB: 1965  Date of evaluation: 6/10/2024    Procedure Summary       Date: 06/10/24 Room / Location: [address]    Anesthesia Start: 0812 Anesthesia Stop: 1532    Procedure: AXILLARY BI-FEMORAL BYPASS (Bilateral) Diagnosis:       Peripheral vascular disease, unspecified (HCC)      (Peripheral vascular disease, unspecified (HCC) [I73.9])    Surgeons: Radha Tapia DO Responsible Provider: Kelly Alcala MD    Anesthesia Type: General ASA Status: 3            Anesthesia Type: General    Cathy Phase I: Cathy Score: 8    Cathy Phase II:      Anesthesia Post Evaluation    Patient location during evaluation: PACU  Patient participation: complete - patient participated  Level of consciousness: sleepy but conscious  Pain score: 0  Airway patency: patent  Nausea & Vomiting: no nausea and no vomiting  Cardiovascular status: blood pressure returned to baseline  Respiratory status: acceptable, spontaneous ventilation, oral airway, nonlabored ventilation and face mask  Hydration status: euvolemic  Comments: BP (!) 159/92   Pulse 59   Temp 97.5 °F (36.4 °C)   Resp 28   Ht 1.676 m (5' 6\")   Wt 59.9 kg (132 lb)   SpO2 98%   BMI 21.31 kg/m²     Pain management: adequate    No notable events documented.

## 2024-06-10 NOTE — INTERVAL H&P NOTE
Update History & Physical    The patient's History and Physical of May 30, 2024 was reviewed with the patient and I examined the patient. There was no change. The surgical site was confirmed by the patient and me.     Plan: The risks, benefits, expected outcome, and alternative to the recommended procedure have been discussed with the patient. Patient understands and wants to proceed with the procedure.     Electronically signed by Radha Tapia DO on 6/10/2024 at 7:18 AM

## 2024-06-11 ENCOUNTER — APPOINTMENT (OUTPATIENT)
Dept: VASCULAR LAB | Age: 59
DRG: 253 | End: 2024-06-11
Attending: SURGERY
Payer: MEDICAID

## 2024-06-11 LAB
ALBUMIN SERPL-MCNC: 3.4 G/DL (ref 3.5–5.2)
ALP SERPL-CCNC: 62 U/L (ref 40–130)
ALT SERPL-CCNC: 11 U/L (ref 5–41)
ANION GAP SERPL CALCULATED.3IONS-SCNC: 10 MMOL/L (ref 7–19)
AST SERPL-CCNC: 20 U/L (ref 5–40)
BILIRUB SERPL-MCNC: 0.3 MG/DL (ref 0.2–1.2)
BUN SERPL-MCNC: 32 MG/DL (ref 6–20)
CALCIUM SERPL-MCNC: 8.9 MG/DL (ref 8.6–10)
CHLORIDE SERPL-SCNC: 108 MMOL/L (ref 98–111)
CO2 SERPL-SCNC: 23 MMOL/L (ref 22–29)
CREAT SERPL-MCNC: 2.6 MG/DL (ref 0.5–1.2)
ECHO BSA: 1.67 M2
ERYTHROCYTE [DISTWIDTH] IN BLOOD BY AUTOMATED COUNT: 13.2 % (ref 11.5–14.5)
GLUCOSE SERPL-MCNC: 139 MG/DL (ref 74–109)
HCT VFR BLD AUTO: 36.4 % (ref 42–52)
HGB BLD-MCNC: 11.8 G/DL (ref 14–18)
MCH RBC QN AUTO: 30.6 PG (ref 27–31)
MCHC RBC AUTO-ENTMCNC: 32.4 G/DL (ref 33–37)
MCV RBC AUTO: 94.5 FL (ref 80–94)
PLATELET # BLD AUTO: 124 K/UL (ref 130–400)
PMV BLD AUTO: 12.2 FL (ref 9.4–12.4)
POTASSIUM SERPL-SCNC: 4.5 MMOL/L (ref 3.5–5)
PROT SERPL-MCNC: 5.6 G/DL (ref 6.6–8.7)
RBC # BLD AUTO: 3.85 M/UL (ref 4.7–6.1)
SODIUM SERPL-SCNC: 141 MMOL/L (ref 136–145)
VAS LEFT ABI: 0.69
VAS LEFT ARM BP: 150 MMHG
VAS LEFT DORSALIS PEDIS BP: 107 MMHG
VAS LEFT PTA BP: 102 MMHG
VAS LEFT TBI: 0.54
VAS LEFT TOE PRESSURE: 85 MMHG
VAS RIGHT ABI: 0.45
VAS RIGHT ARM BP: 156 MMHG
VAS RIGHT DORSALIS PEDIS BP: 70 MMHG
VAS RIGHT PTA BP: 70 MMHG
VAS RIGHT TBI: 0.35
VAS RIGHT TOE PRESSURE: 55 MMHG
WBC # BLD AUTO: 10.6 K/UL (ref 4.8–10.8)

## 2024-06-11 PROCEDURE — 6360000002 HC RX W HCPCS: Performed by: SURGERY

## 2024-06-11 PROCEDURE — 99232 SBSQ HOSP IP/OBS MODERATE 35: CPT | Performed by: SURGERY

## 2024-06-11 PROCEDURE — 93922 UPR/L XTREMITY ART 2 LEVELS: CPT | Performed by: SURGERY

## 2024-06-11 PROCEDURE — 93922 UPR/L XTREMITY ART 2 LEVELS: CPT

## 2024-06-11 PROCEDURE — 85027 COMPLETE CBC AUTOMATED: CPT

## 2024-06-11 PROCEDURE — 1200000000 HC SEMI PRIVATE

## 2024-06-11 PROCEDURE — 36415 COLL VENOUS BLD VENIPUNCTURE: CPT

## 2024-06-11 PROCEDURE — 80053 COMPREHEN METABOLIC PANEL: CPT

## 2024-06-11 PROCEDURE — 94760 N-INVAS EAR/PLS OXIMETRY 1: CPT

## 2024-06-11 PROCEDURE — 2580000003 HC RX 258: Performed by: SURGERY

## 2024-06-11 PROCEDURE — 6370000000 HC RX 637 (ALT 250 FOR IP): Performed by: SURGERY

## 2024-06-11 RX ORDER — LANOLIN ALCOHOL/MO/W.PET/CERES
CREAM (GRAM) TOPICAL DAILY
Status: DISCONTINUED | OUTPATIENT
Start: 2024-06-11 | End: 2024-06-16 | Stop reason: HOSPADM

## 2024-06-11 RX ORDER — ONDANSETRON 2 MG/ML
4 INJECTION INTRAMUSCULAR; INTRAVENOUS EVERY 6 HOURS PRN
Status: DISCONTINUED | OUTPATIENT
Start: 2024-06-11 | End: 2024-06-16 | Stop reason: HOSPADM

## 2024-06-11 RX ORDER — UREA 200 MG/G
CREAM TOPICAL DAILY
Status: DISCONTINUED | OUTPATIENT
Start: 2024-06-12 | End: 2024-06-16 | Stop reason: HOSPADM

## 2024-06-11 RX ADMIN — SODIUM CHLORIDE, PRESERVATIVE FREE 10 ML: 5 INJECTION INTRAVENOUS at 21:03

## 2024-06-11 RX ADMIN — HYDROCODONE BITARTRATE AND ACETAMINOPHEN 1 TABLET: 10; 325 TABLET ORAL at 01:48

## 2024-06-11 RX ADMIN — ATORVASTATIN CALCIUM 40 MG: 40 TABLET, FILM COATED ORAL at 08:36

## 2024-06-11 RX ADMIN — Medication: at 13:31

## 2024-06-11 RX ADMIN — LOSARTAN POTASSIUM 25 MG: 50 TABLET, FILM COATED ORAL at 08:36

## 2024-06-11 RX ADMIN — ONDANSETRON 4 MG: 2 INJECTION INTRAMUSCULAR; INTRAVENOUS at 07:33

## 2024-06-11 RX ADMIN — FINASTERIDE 5 MG: 5 TABLET, FILM COATED ORAL at 08:36

## 2024-06-11 RX ADMIN — HYDROCODONE BITARTRATE AND ACETAMINOPHEN 1 TABLET: 10; 325 TABLET ORAL at 13:47

## 2024-06-11 RX ADMIN — SODIUM CHLORIDE, PRESERVATIVE FREE 10 ML: 5 INJECTION INTRAVENOUS at 08:36

## 2024-06-11 ASSESSMENT — PAIN SCALES - GENERAL
PAINLEVEL_OUTOF10: 8
PAINLEVEL_OUTOF10: 8

## 2024-06-11 ASSESSMENT — PAIN DESCRIPTION - DESCRIPTORS
DESCRIPTORS: ACHING
DESCRIPTORS: ACHING

## 2024-06-11 ASSESSMENT — PAIN DESCRIPTION - LOCATION
LOCATION: SHOULDER
LOCATION: CHEST

## 2024-06-11 ASSESSMENT — PAIN DESCRIPTION - ORIENTATION
ORIENTATION: LEFT
ORIENTATION: RIGHT

## 2024-06-11 ASSESSMENT — PAIN - FUNCTIONAL ASSESSMENT
PAIN_FUNCTIONAL_ASSESSMENT: ACTIVITIES ARE NOT PREVENTED
PAIN_FUNCTIONAL_ASSESSMENT: PREVENTS OR INTERFERES SOME ACTIVE ACTIVITIES AND ADLS

## 2024-06-11 NOTE — DISCHARGE INSTRUCTIONS
Eucerin, Lubriderm,or Uma.  DO NOT rub lotion on incision lines, and don't use an over abundance of lotion and do not allow feel and legs to remain moist.  Dry between toes very well.    8. We would like to encourage you to follow a low cholesterol diet when at home.  A decreased appetite is common after surgery, and will return to normal in time.    9.  Avoid heating pads, heat lamps, or hot water bottles to the feet.    10.  Notify your doctor of an ingrown toenail and do not attempt to remove it yourself.      11.  To help prevent skin breakdown, avoid tape, cream hair removers, and harsh chemicals to the skin on the leg.    12.  IF YOU SMOKE, STOP... If you do not smoke, don't start.  Smoking makes the blood vessels smaller, increases atherosclerosis and increases blood pressure.  It also significantly increases the risk of getting a wound infection and slower wound healing.     13.  Your home prescriptions will include a mild analgesic for pain relief and also medications to open your blood vessels and help decrease the formation of atherosclerosis.      14.  You will have an appointment with your doctor in about 2 weeks for staple removal.  This is a follow up examination after your surgery and it is important to see him/her to evaluate your progress.  You will be scheduled for periodic non-invasive vascular testing (ultrasound) of the arteries in your legs so he/she can follow your progress.    15.  If you develop any problems, or have any questions, please call us at [phone].    16.  Things to watch for include:   A.  New or unusual drainage from the incision   B.  Change in the color/amount of drainage or odor of drainage   C.  Fever/chills   D.  Sudden severe leg pain OR right hand or arm pain   E.  Sudden swelling of the feet and legs   F.  Numbness or paralysis of the foot   G. Tingling, coldness or discoloration of the leg                                          FOOT CARE INSTRUCTIONS    1) Wash

## 2024-06-11 NOTE — PLAN OF CARE
Problem: Discharge Planning  Goal: Discharge to home or other facility with appropriate resources  Outcome: Progressing  Flowsheets (Taken 6/10/2024 1711 by Alondra Galan, RN)  Discharge to home or other facility with appropriate resources:   Identify barriers to discharge with patient and caregiver   Identify discharge learning needs (meds, wound care, etc)   Refer to discharge planning if patient needs post-hospital services based on physician order or complex needs related to functional status, cognitive ability or social support system   Arrange for needed discharge resources and transportation as appropriate   Arrange for interpreters to assist at discharge as needed     Problem: Pain  Goal: Verbalizes/displays adequate comfort level or baseline comfort level  Outcome: Progressing     Problem: ABCDS Injury Assessment  Goal: Absence of physical injury  Outcome: Progressing     Problem: Risk for Elopement  Goal: Patient will not exit the unit/facility without proper excort  Outcome: Progressing

## 2024-06-11 NOTE — PLAN OF CARE
Problem: Discharge Planning  Goal: Discharge to home or other facility with appropriate resources  6/11/2024 1018 by Vesna Moon RN  Outcome: Progressing  6/10/2024 2126 by Chrystal Vela RN  Outcome: Progressing  Flowsheets (Taken 6/10/2024 1711 by Alondra Galan, RN)  Discharge to home or other facility with appropriate resources:   Identify barriers to discharge with patient and caregiver   Identify discharge learning needs (meds, wound care, etc)   Refer to discharge planning if patient needs post-hospital services based on physician order or complex needs related to functional status, cognitive ability or social support system   Arrange for needed discharge resources and transportation as appropriate   Arrange for interpreters to assist at discharge as needed     Problem: Pain  Goal: Verbalizes/displays adequate comfort level or baseline comfort level  6/11/2024 1018 by Vesna Moon RN  Outcome: Progressing  6/10/2024 2126 by Chrystal Vela RN  Outcome: Progressing     Problem: ABCDS Injury Assessment  Goal: Absence of physical injury  6/11/2024 1018 by Vesna Moon RN  Outcome: Progressing  6/10/2024 2126 by Chrystal Vela, RN  Outcome: Progressing     Problem: Risk for Elopement  Goal: Patient will not exit the unit/facility without proper excort  6/11/2024 1018 by Vesna Moon RN  Outcome: Progressing  6/10/2024 2126 by Chrystal Vela RN  Outcome: Progressing     Problem: Safety - Adult  Goal: Free from fall injury  Outcome: Progressing

## 2024-06-12 PROCEDURE — 99232 SBSQ HOSP IP/OBS MODERATE 35: CPT | Performed by: SURGERY

## 2024-06-12 PROCEDURE — 6370000000 HC RX 637 (ALT 250 FOR IP): Performed by: SURGERY

## 2024-06-12 PROCEDURE — 6370000000 HC RX 637 (ALT 250 FOR IP)

## 2024-06-12 PROCEDURE — 2580000003 HC RX 258: Performed by: SURGERY

## 2024-06-12 PROCEDURE — 94760 N-INVAS EAR/PLS OXIMETRY 1: CPT

## 2024-06-12 PROCEDURE — 1200000000 HC SEMI PRIVATE

## 2024-06-12 PROCEDURE — 6360000002 HC RX W HCPCS: Performed by: SURGERY

## 2024-06-12 RX ORDER — OXYCODONE AND ACETAMINOPHEN 10; 325 MG/1; MG/1
1 TABLET ORAL EVERY 6 HOURS PRN
Status: DISCONTINUED | OUTPATIENT
Start: 2024-06-12 | End: 2024-06-16 | Stop reason: HOSPADM

## 2024-06-12 RX ADMIN — ONDANSETRON 4 MG: 2 INJECTION INTRAMUSCULAR; INTRAVENOUS at 06:05

## 2024-06-12 RX ADMIN — Medication: at 14:27

## 2024-06-12 RX ADMIN — LOSARTAN POTASSIUM 25 MG: 50 TABLET, FILM COATED ORAL at 08:29

## 2024-06-12 RX ADMIN — OXYCODONE HYDROCHLORIDE AND ACETAMINOPHEN 1 TABLET: 10; 325 TABLET ORAL at 22:53

## 2024-06-12 RX ADMIN — Medication: at 14:26

## 2024-06-12 RX ADMIN — HYDROCODONE BITARTRATE AND ACETAMINOPHEN 1 TABLET: 10; 325 TABLET ORAL at 02:22

## 2024-06-12 RX ADMIN — ATORVASTATIN CALCIUM 40 MG: 40 TABLET, FILM COATED ORAL at 08:30

## 2024-06-12 RX ADMIN — FINASTERIDE 5 MG: 5 TABLET, FILM COATED ORAL at 08:30

## 2024-06-12 RX ADMIN — SODIUM CHLORIDE, PRESERVATIVE FREE 10 ML: 5 INJECTION INTRAVENOUS at 20:10

## 2024-06-12 ASSESSMENT — PAIN SCALES - GENERAL
PAINLEVEL_OUTOF10: 8
PAINLEVEL_OUTOF10: 6
PAINLEVEL_OUTOF10: 3

## 2024-06-12 ASSESSMENT — PAIN DESCRIPTION - DESCRIPTORS
DESCRIPTORS: ACHING
DESCRIPTORS: ACHING

## 2024-06-12 ASSESSMENT — PAIN DESCRIPTION - ORIENTATION
ORIENTATION: RIGHT
ORIENTATION: RIGHT;LEFT

## 2024-06-12 ASSESSMENT — PAIN DESCRIPTION - LOCATION
LOCATION: SHOULDER
LOCATION: GROIN

## 2024-06-12 NOTE — PLAN OF CARE
Problem: Discharge Planning  Goal: Discharge to home or other facility with appropriate resources  6/11/2024 2133 by Zia Hernandez RN  Outcome: Progressing  6/11/2024 1018 by Vesna Moon RN  Outcome: Progressing     Problem: Pain  Goal: Verbalizes/displays adequate comfort level or baseline comfort level  6/11/2024 2133 by Zia Hernandez RN  Outcome: Progressing  6/11/2024 1018 by Vesna Moon RN  Outcome: Progressing     Problem: ABCDS Injury Assessment  Goal: Absence of physical injury  6/11/2024 2133 by Zia Hernandez RN  Outcome: Progressing  6/11/2024 1018 by Vesna Moon RN  Outcome: Progressing     Problem: Risk for Elopement  Goal: Patient will not exit the unit/facility without proper excort  6/11/2024 2133 by Zia Hernandez RN  Outcome: Progressing  6/11/2024 1018 by Vesna Moon RN  Outcome: Progressing     Problem: Safety - Adult  Goal: Free from fall injury  6/11/2024 2133 by Zia Hernandez RN  Outcome: Progressing  6/11/2024 1018 by Vesna Moon RN  Outcome: Progressing

## 2024-06-12 NOTE — CARE COORDINATION
Case Management Assessment  Initial Evaluation    Date/Time of Evaluation: 6/12/2024 11:35 AM  Assessment Completed by: Amada Michelle RN    If patient is discharged prior to next notation, then this note serves as note for discharge by case management.    Patient Name: Tyler Franks                   YOB: 1965  Diagnosis: Peripheral vascular disease, unspecified (HCC) [I73.9]  PAD (peripheral artery disease) (HCC) [I73.9]                   Date / Time: 6/10/2024  5:49 AM    Patient Admission Status: Inpatient   Readmission Risk (Low < 19, Mod (19-27), High > 27): Readmission Risk Score: 14.6    Current PCP: No primary care provider on file.  PCP verified by CM?      Chart Reviewed: Yes      History Provided by:    Patient Orientation:      Patient Cognition:      Hospitalization in the last 30 days (Readmission):  No    If yes, Readmission Assessment in CM Navigator will be completed.    Advance Directives:      Code Status: Full Code   Patient's Primary Decision Maker is:        Discharge Planning:    Patient lives with: Children Type of Home: House  Primary Care Giver:    Patient Support Systems include: Children, Family Members, Friends/Neighbors   Current Financial resources:    Current community resources:    Current services prior to admission: None            Current DME:              Type of Home Care services:  None    ADLS  Prior functional level:    Current functional level:      PT AM-PAC:   /24  OT AM-PAC:   /24    Family can provide assistance at DC:    Would you like Case Management to discuss the discharge plan with any other family members/significant others, and if so, who?    Plans to Return to Present Housing:    Other Identified Issues/Barriers to RETURNING to current housing:   Potential Assistance needed at discharge: N/A            Potential DME:    Patient expects to discharge to: House  Plan for transportation at discharge:      Financial    Payor: [employer] /

## 2024-06-13 ENCOUNTER — ANESTHESIA (OUTPATIENT)
Dept: OPERATING ROOM | Age: 59
End: 2024-06-13
Payer: MEDICAID

## 2024-06-13 ENCOUNTER — APPOINTMENT (OUTPATIENT)
Dept: INTERVENTIONAL RADIOLOGY/VASCULAR | Age: 59
DRG: 253 | End: 2024-06-13
Attending: SURGERY
Payer: MEDICAID

## 2024-06-13 ENCOUNTER — HOSPITAL ENCOUNTER (INPATIENT)
Dept: VASCULAR LAB | Age: 59
Discharge: HOME | DRG: 253 | End: 2024-06-15
Attending: SURGERY
Payer: MEDICAID

## 2024-06-13 ENCOUNTER — ANESTHESIA EVENT (OUTPATIENT)
Dept: OPERATING ROOM | Age: 59
End: 2024-06-13
Payer: MEDICAID

## 2024-06-13 LAB
ANION GAP SERPL CALCULATED.3IONS-SCNC: 14 MMOL/L (ref 7–19)
ANTI-XA UNFRAC HEPARIN: 0.84 IU/ML (ref 0.3–0.7)
ANTI-XA UNFRAC HEPARIN: <0.1 IU/ML (ref 0.3–0.7)
APTT PPP: 31.8 SEC (ref 26–36.2)
BUN SERPL-MCNC: 37 MG/DL (ref 6–20)
CALCIUM SERPL-MCNC: 9.2 MG/DL (ref 8.6–10)
CHLORIDE SERPL-SCNC: 105 MMOL/L (ref 98–111)
CO2 SERPL-SCNC: 20 MMOL/L (ref 22–29)
CREAT SERPL-MCNC: 2.7 MG/DL (ref 0.5–1.2)
ECHO BSA: 1.67 M2
ERYTHROCYTE [DISTWIDTH] IN BLOOD BY AUTOMATED COUNT: 13 % (ref 11.5–14.5)
GLUCOSE SERPL-MCNC: 112 MG/DL (ref 74–109)
HCT VFR BLD AUTO: 39.1 % (ref 42–52)
HGB BLD-MCNC: 12.8 G/DL (ref 14–18)
INR PPP: 1.06 (ref 0.88–1.18)
MCH RBC QN AUTO: 30.8 PG (ref 27–31)
MCHC RBC AUTO-ENTMCNC: 32.7 G/DL (ref 33–37)
MCV RBC AUTO: 94 FL (ref 80–94)
PLATELET # BLD AUTO: 106 K/UL (ref 130–400)
PMV BLD AUTO: 11.7 FL (ref 9.4–12.4)
POTASSIUM SERPL-SCNC: 4.5 MMOL/L (ref 3.5–5)
PROTHROMBIN TIME: 13.5 SEC (ref 12–14.6)
RBC # BLD AUTO: 4.16 M/UL (ref 4.7–6.1)
SODIUM SERPL-SCNC: 139 MMOL/L (ref 136–145)
VAS LEFT ATA DIST PSV: 9.7 CM/S
VAS LEFT ATA MID PSV: 14.1 CM/S
VAS LEFT ATA PROX PSV: 15.1 CM/S
VAS LEFT PERONEAL DIST PSV: 7.8 CM/S
VAS LEFT PERONEAL MID PSV: 10.7 CM/S
VAS LEFT PERONEAL PROX PSV: 9.4 CM/S
VAS LEFT PFA PROX PSV: 122 CM/S
VAS LEFT POP A DIST PSV: 15.4 CM/S
VAS LEFT POP A PROX PSV: 25.4 CM/S
VAS LEFT PTA DIST PSV: 11.6 CM/S
VAS LEFT PTA MID PSV: 10.3 CM/S
VAS LEFT PTA PROX PSV: 12.2 CM/S
VAS LEFT SFA DIST PSV: 0 CM/S
VAS LEFT SFA MID PSV: 0 CM/S
VAS LEFT SFA MID VEL RATIO: 0
VAS LEFT SFA PROX PSV: 45.2 CM/S
VAS LEFT VENOUS DIST ANASTOMOSIS EDV: 7 CM/S
VAS LEFT VENOUS DIST ANASTOMOSIS PSV: 42 CM/S
VAS RIGHT ATA DIST PSV: 4 CM/S
VAS RIGHT ATA PROX PSV: 4.8 CM/S
VAS RIGHT DIST OUTFLOW EDV: 6 CM/S
VAS RIGHT DIST OUTFLOW PSV: 35 CM/S
VAS RIGHT GRAFT 1: NORMAL
VAS RIGHT MID OUTFLOW EDV: 7 CM/S
VAS RIGHT MID OUTFLOW PSV: 36 CM/S
VAS RIGHT PERONEAL PROX PSV: 2.9 CM/S
VAS RIGHT PFA PROX PSV: 45 CM/S
VAS RIGHT POP A DIST PSV: 9.7 CM/S
VAS RIGHT POP A PROX PSV: 10.6 CM/S
VAS RIGHT PROX OUTFLOW EDV: 6 CM/S
VAS RIGHT PROX OUTFLOW PSV: 36 CM/S
VAS RIGHT PTA DIST PSV: 2.5 CM/S
VAS RIGHT PTA PROX PSV: 4.3 CM/S
VAS RIGHT SFA DIST PSV: 0 CM/S
VAS RIGHT SFA MID PSV: 0 CM/S
VAS RIGHT SFA PROX PSV: 0 CM/S
VAS RIGHT VENOUS DIST ANASTOMOSIS PSV: 0 CM/S
WBC # BLD AUTO: 14 K/UL (ref 4.8–10.8)

## 2024-06-13 PROCEDURE — 3700000001 HC ADD 15 MINUTES (ANESTHESIA): Performed by: SURGERY

## 2024-06-13 PROCEDURE — C1757 CATH, THROMBECTOMY/EMBOLECT: HCPCS | Performed by: SURGERY

## 2024-06-13 PROCEDURE — 1200000000 HC SEMI PRIVATE

## 2024-06-13 PROCEDURE — 6360000002 HC RX W HCPCS: Performed by: NURSE ANESTHETIST, CERTIFIED REGISTERED

## 2024-06-13 PROCEDURE — C1769 GUIDE WIRE: HCPCS | Performed by: SURGERY

## 2024-06-13 PROCEDURE — 36415 COLL VENOUS BLD VENIPUNCTURE: CPT

## 2024-06-13 PROCEDURE — 2500000003 HC RX 250 WO HCPCS: Performed by: NURSE ANESTHETIST, CERTIFIED REGISTERED

## 2024-06-13 PROCEDURE — 2580000003 HC RX 258: Performed by: SURGERY

## 2024-06-13 PROCEDURE — 6360000002 HC RX W HCPCS: Performed by: SURGERY

## 2024-06-13 PROCEDURE — A4217 STERILE WATER/SALINE, 500 ML: HCPCS | Performed by: SURGERY

## 2024-06-13 PROCEDURE — 94760 N-INVAS EAR/PLS OXIMETRY 1: CPT

## 2024-06-13 PROCEDURE — 85027 COMPLETE CBC AUTOMATED: CPT

## 2024-06-13 PROCEDURE — 3600000007 HC SURGERY HYBRID BASE: Performed by: SURGERY

## 2024-06-13 PROCEDURE — 2700000000 HC OXYGEN THERAPY PER DAY

## 2024-06-13 PROCEDURE — 93925 LOWER EXTREMITY STUDY: CPT

## 2024-06-13 PROCEDURE — 04CK0ZZ EXTIRPATION OF MATTER FROM RIGHT FEMORAL ARTERY, OPEN APPROACH: ICD-10-PCS | Performed by: SURGERY

## 2024-06-13 PROCEDURE — 7100000001 HC PACU RECOVERY - ADDTL 15 MIN: Performed by: SURGERY

## 2024-06-13 PROCEDURE — 85610 PROTHROMBIN TIME: CPT

## 2024-06-13 PROCEDURE — 7100000000 HC PACU RECOVERY - FIRST 15 MIN: Performed by: SURGERY

## 2024-06-13 PROCEDURE — 2580000003 HC RX 258: Performed by: NURSE ANESTHETIST, CERTIFIED REGISTERED

## 2024-06-13 PROCEDURE — 2709999900 HC NON-CHARGEABLE SUPPLY: Performed by: SURGERY

## 2024-06-13 PROCEDURE — 3600000017 HC SURGERY HYBRID ADDL 15MIN: Performed by: SURGERY

## 2024-06-13 PROCEDURE — 2580000003 HC RX 258: Performed by: ANESTHESIOLOGY

## 2024-06-13 PROCEDURE — 93925 LOWER EXTREMITY STUDY: CPT | Performed by: SURGERY

## 2024-06-13 PROCEDURE — 85730 THROMBOPLASTIN TIME PARTIAL: CPT

## 2024-06-13 PROCEDURE — 6370000000 HC RX 637 (ALT 250 FOR IP): Performed by: SURGERY

## 2024-06-13 PROCEDURE — 99233 SBSQ HOSP IP/OBS HIGH 50: CPT | Performed by: SURGERY

## 2024-06-13 PROCEDURE — 75710 ARTERY X-RAYS ARM/LEG: CPT

## 2024-06-13 PROCEDURE — 80048 BASIC METABOLIC PNL TOTAL CA: CPT

## 2024-06-13 PROCEDURE — 85520 HEPARIN ASSAY: CPT

## 2024-06-13 PROCEDURE — 3700000000 HC ANESTHESIA ATTENDED CARE: Performed by: SURGERY

## 2024-06-13 PROCEDURE — 6360000004 HC RX CONTRAST MEDICATION: Performed by: SURGERY

## 2024-06-13 DEVICE — CLIP INT L ORNG TI TRNSVRS GRV CHEVRON SHP W/ PRECIS TIP TO: Type: IMPLANTABLE DEVICE | Status: FUNCTIONAL

## 2024-06-13 RX ORDER — HEPARIN SODIUM 1000 [USP'U]/ML
40 INJECTION, SOLUTION INTRAVENOUS; SUBCUTANEOUS PRN
Status: DISCONTINUED | OUTPATIENT
Start: 2024-06-13 | End: 2024-06-14 | Stop reason: ALTCHOICE

## 2024-06-13 RX ORDER — SODIUM CHLORIDE 9 MG/ML
INJECTION, SOLUTION INTRAVENOUS PRN
Status: DISCONTINUED | OUTPATIENT
Start: 2024-06-13 | End: 2024-06-13 | Stop reason: HOSPADM

## 2024-06-13 RX ORDER — LIDOCAINE HYDROCHLORIDE 10 MG/ML
INJECTION, SOLUTION INFILTRATION; PERINEURAL PRN
Status: DISCONTINUED | OUTPATIENT
Start: 2024-06-13 | End: 2024-06-13 | Stop reason: SDUPTHER

## 2024-06-13 RX ORDER — SODIUM CHLORIDE 0.9 % (FLUSH) 0.9 %
5-40 SYRINGE (ML) INJECTION PRN
Status: DISCONTINUED | OUTPATIENT
Start: 2024-06-13 | End: 2024-06-13 | Stop reason: HOSPADM

## 2024-06-13 RX ORDER — SODIUM CHLORIDE, SODIUM LACTATE, POTASSIUM CHLORIDE, CALCIUM CHLORIDE 600; 310; 30; 20 MG/100ML; MG/100ML; MG/100ML; MG/100ML
INJECTION, SOLUTION INTRAVENOUS CONTINUOUS PRN
Status: DISCONTINUED | OUTPATIENT
Start: 2024-06-13 | End: 2024-06-13 | Stop reason: SDUPTHER

## 2024-06-13 RX ORDER — HEPARIN SODIUM 1000 [USP'U]/ML
INJECTION, SOLUTION INTRAVENOUS; SUBCUTANEOUS PRN
Status: DISCONTINUED | OUTPATIENT
Start: 2024-06-13 | End: 2024-06-13 | Stop reason: SDUPTHER

## 2024-06-13 RX ORDER — ONDANSETRON 2 MG/ML
4 INJECTION INTRAMUSCULAR; INTRAVENOUS
Status: DISCONTINUED | OUTPATIENT
Start: 2024-06-13 | End: 2024-06-13 | Stop reason: HOSPADM

## 2024-06-13 RX ORDER — SODIUM CHLORIDE, SODIUM LACTATE, POTASSIUM CHLORIDE, CALCIUM CHLORIDE 600; 310; 30; 20 MG/100ML; MG/100ML; MG/100ML; MG/100ML
INJECTION, SOLUTION INTRAVENOUS CONTINUOUS
Status: DISCONTINUED | OUTPATIENT
Start: 2024-06-13 | End: 2024-06-13 | Stop reason: HOSPADM

## 2024-06-13 RX ORDER — ONDANSETRON 2 MG/ML
INJECTION INTRAMUSCULAR; INTRAVENOUS PRN
Status: DISCONTINUED | OUTPATIENT
Start: 2024-06-13 | End: 2024-06-13 | Stop reason: SDUPTHER

## 2024-06-13 RX ORDER — PROPOFOL 10 MG/ML
INJECTION, EMULSION INTRAVENOUS PRN
Status: DISCONTINUED | OUTPATIENT
Start: 2024-06-13 | End: 2024-06-13 | Stop reason: SDUPTHER

## 2024-06-13 RX ORDER — ROCURONIUM BROMIDE 10 MG/ML
INJECTION, SOLUTION INTRAVENOUS PRN
Status: DISCONTINUED | OUTPATIENT
Start: 2024-06-13 | End: 2024-06-13 | Stop reason: SDUPTHER

## 2024-06-13 RX ORDER — HYDROMORPHONE HYDROCHLORIDE 1 MG/ML
0.5 INJECTION, SOLUTION INTRAMUSCULAR; INTRAVENOUS; SUBCUTANEOUS EVERY 5 MIN PRN
Status: DISCONTINUED | OUTPATIENT
Start: 2024-06-13 | End: 2024-06-13 | Stop reason: HOSPADM

## 2024-06-13 RX ORDER — METOPROLOL TARTRATE 1 MG/ML
INJECTION, SOLUTION INTRAVENOUS PRN
Status: DISCONTINUED | OUTPATIENT
Start: 2024-06-13 | End: 2024-06-13 | Stop reason: SDUPTHER

## 2024-06-13 RX ORDER — MIDAZOLAM HYDROCHLORIDE 1 MG/ML
INJECTION INTRAMUSCULAR; INTRAVENOUS PRN
Status: DISCONTINUED | OUTPATIENT
Start: 2024-06-13 | End: 2024-06-13 | Stop reason: SDUPTHER

## 2024-06-13 RX ORDER — HEPARIN SODIUM 1000 [USP'U]/ML
80 INJECTION, SOLUTION INTRAVENOUS; SUBCUTANEOUS PRN
Status: DISCONTINUED | OUTPATIENT
Start: 2024-06-13 | End: 2024-06-14 | Stop reason: ALTCHOICE

## 2024-06-13 RX ORDER — SODIUM CHLORIDE 0.9 % (FLUSH) 0.9 %
5-40 SYRINGE (ML) INJECTION EVERY 12 HOURS SCHEDULED
Status: DISCONTINUED | OUTPATIENT
Start: 2024-06-13 | End: 2024-06-16 | Stop reason: HOSPADM

## 2024-06-13 RX ORDER — SODIUM CHLORIDE 9 MG/ML
INJECTION, SOLUTION INTRAVENOUS CONTINUOUS
Status: DISCONTINUED | OUTPATIENT
Start: 2024-06-13 | End: 2024-06-16

## 2024-06-13 RX ORDER — HEPARIN SODIUM 1000 [USP'U]/ML
80 INJECTION, SOLUTION INTRAVENOUS; SUBCUTANEOUS ONCE
Status: COMPLETED | OUTPATIENT
Start: 2024-06-13 | End: 2024-06-13

## 2024-06-13 RX ORDER — NALOXONE HYDROCHLORIDE 0.4 MG/ML
INJECTION, SOLUTION INTRAMUSCULAR; INTRAVENOUS; SUBCUTANEOUS PRN
Status: DISCONTINUED | OUTPATIENT
Start: 2024-06-13 | End: 2024-06-13 | Stop reason: HOSPADM

## 2024-06-13 RX ORDER — FENTANYL CITRATE 50 UG/ML
INJECTION, SOLUTION INTRAMUSCULAR; INTRAVENOUS PRN
Status: DISCONTINUED | OUTPATIENT
Start: 2024-06-13 | End: 2024-06-13 | Stop reason: SDUPTHER

## 2024-06-13 RX ORDER — SODIUM CHLORIDE 0.9 % (FLUSH) 0.9 %
5-40 SYRINGE (ML) INJECTION EVERY 12 HOURS SCHEDULED
Status: DISCONTINUED | OUTPATIENT
Start: 2024-06-13 | End: 2024-06-13 | Stop reason: HOSPADM

## 2024-06-13 RX ORDER — HYDROMORPHONE HYDROCHLORIDE 1 MG/ML
0.25 INJECTION, SOLUTION INTRAMUSCULAR; INTRAVENOUS; SUBCUTANEOUS EVERY 5 MIN PRN
Status: DISCONTINUED | OUTPATIENT
Start: 2024-06-13 | End: 2024-06-13 | Stop reason: HOSPADM

## 2024-06-13 RX ORDER — HEPARIN SODIUM 10000 [USP'U]/100ML
5-30 INJECTION, SOLUTION INTRAVENOUS CONTINUOUS
Status: DISCONTINUED | OUTPATIENT
Start: 2024-06-13 | End: 2024-06-14 | Stop reason: ALTCHOICE

## 2024-06-13 RX ORDER — IODIXANOL 320 MG/ML
INJECTION, SOLUTION INTRAVASCULAR PRN
Status: DISCONTINUED | OUTPATIENT
Start: 2024-06-13 | End: 2024-06-13 | Stop reason: ALTCHOICE

## 2024-06-13 RX ORDER — SODIUM CHLORIDE 9 MG/ML
INJECTION, SOLUTION INTRAVENOUS PRN
Status: DISCONTINUED | OUTPATIENT
Start: 2024-06-13 | End: 2024-06-16 | Stop reason: HOSPADM

## 2024-06-13 RX ORDER — SODIUM CHLORIDE 0.9 % (FLUSH) 0.9 %
5-40 SYRINGE (ML) INJECTION PRN
Status: DISCONTINUED | OUTPATIENT
Start: 2024-06-13 | End: 2024-06-16 | Stop reason: HOSPADM

## 2024-06-13 RX ADMIN — ATORVASTATIN CALCIUM 40 MG: 40 TABLET, FILM COATED ORAL at 07:55

## 2024-06-13 RX ADMIN — HEPARIN SODIUM 4800 UNITS: 1000 INJECTION INTRAVENOUS; SUBCUTANEOUS at 13:11

## 2024-06-13 RX ADMIN — SODIUM CHLORIDE: 9 INJECTION, SOLUTION INTRAVENOUS at 18:27

## 2024-06-13 RX ADMIN — HEPARIN SODIUM 3000 UNITS: 1000 INJECTION, SOLUTION INTRAVENOUS; SUBCUTANEOUS at 15:37

## 2024-06-13 RX ADMIN — FINASTERIDE 5 MG: 5 TABLET, FILM COATED ORAL at 07:55

## 2024-06-13 RX ADMIN — MIDAZOLAM 2 MG: 1 INJECTION INTRAMUSCULAR; INTRAVENOUS at 14:58

## 2024-06-13 RX ADMIN — SUGAMMADEX 200 MG: 100 INJECTION, SOLUTION INTRAVENOUS at 16:33

## 2024-06-13 RX ADMIN — FENTANYL CITRATE 50 MCG: 0.05 INJECTION, SOLUTION INTRAMUSCULAR; INTRAVENOUS at 15:23

## 2024-06-13 RX ADMIN — ROCURONIUM BROMIDE 50 MG: 10 INJECTION, SOLUTION INTRAVENOUS at 15:09

## 2024-06-13 RX ADMIN — PROPOFOL 100 MG: 10 INJECTION, EMULSION INTRAVENOUS at 15:09

## 2024-06-13 RX ADMIN — HYDROMORPHONE HYDROCHLORIDE 0.4 MG: 1 INJECTION, SOLUTION INTRAMUSCULAR; INTRAVENOUS; SUBCUTANEOUS at 16:23

## 2024-06-13 RX ADMIN — SODIUM CHLORIDE, PRESERVATIVE FREE 10 ML: 5 INJECTION INTRAVENOUS at 20:26

## 2024-06-13 RX ADMIN — PROPOFOL 50 MG: 10 INJECTION, EMULSION INTRAVENOUS at 15:34

## 2024-06-13 RX ADMIN — Medication 1000 MG: at 15:13

## 2024-06-13 RX ADMIN — PROPOFOL 30 MG: 10 INJECTION, EMULSION INTRAVENOUS at 15:20

## 2024-06-13 RX ADMIN — LOSARTAN POTASSIUM 25 MG: 50 TABLET, FILM COATED ORAL at 07:55

## 2024-06-13 RX ADMIN — HYDROMORPHONE HYDROCHLORIDE 0.2 MG: 1 INJECTION, SOLUTION INTRAMUSCULAR; INTRAVENOUS; SUBCUTANEOUS at 16:18

## 2024-06-13 RX ADMIN — SODIUM CHLORIDE, SODIUM LACTATE, POTASSIUM CHLORIDE, AND CALCIUM CHLORIDE: 600; 310; 30; 20 INJECTION, SOLUTION INTRAVENOUS at 14:43

## 2024-06-13 RX ADMIN — HYDROMORPHONE HYDROCHLORIDE 0.2 MG: 1 INJECTION, SOLUTION INTRAMUSCULAR; INTRAVENOUS; SUBCUTANEOUS at 16:12

## 2024-06-13 RX ADMIN — SODIUM CHLORIDE, POTASSIUM CHLORIDE, SODIUM LACTATE AND CALCIUM CHLORIDE: 600; 310; 30; 20 INJECTION, SOLUTION INTRAVENOUS at 14:58

## 2024-06-13 RX ADMIN — Medication: at 07:56

## 2024-06-13 RX ADMIN — SODIUM CHLORIDE, PRESERVATIVE FREE 10 ML: 5 INJECTION INTRAVENOUS at 08:00

## 2024-06-13 RX ADMIN — OXYCODONE HYDROCHLORIDE AND ACETAMINOPHEN 1 TABLET: 10; 325 TABLET ORAL at 23:07

## 2024-06-13 RX ADMIN — METOPROLOL TARTRATE 2.5 MG: 1 INJECTION, SOLUTION INTRAVENOUS at 15:54

## 2024-06-13 RX ADMIN — LIDOCAINE HYDROCHLORIDE 50 MG: 10 INJECTION, SOLUTION INFILTRATION; PERINEURAL at 15:32

## 2024-06-13 RX ADMIN — HEPARIN SODIUM 18 UNITS/KG/HR: 10000 INJECTION, SOLUTION INTRAVENOUS at 13:21

## 2024-06-13 RX ADMIN — FENTANYL CITRATE 50 MCG: 0.05 INJECTION, SOLUTION INTRAMUSCULAR; INTRAVENOUS at 15:07

## 2024-06-13 RX ADMIN — LIDOCAINE HYDROCHLORIDE 50 MG: 10 INJECTION, SOLUTION INFILTRATION; PERINEURAL at 15:02

## 2024-06-13 RX ADMIN — HYDROMORPHONE HYDROCHLORIDE 0.2 MG: 1 INJECTION, SOLUTION INTRAMUSCULAR; INTRAVENOUS; SUBCUTANEOUS at 15:44

## 2024-06-13 ASSESSMENT — LIFESTYLE VARIABLES: SMOKING_STATUS: 0

## 2024-06-13 ASSESSMENT — PAIN SCALES - GENERAL: PAINLEVEL_OUTOF10: 5

## 2024-06-13 NOTE — PROGRESS NOTES
Vascular lab preliminary.  Right Axbifemoral bypass and LE arterial studies completed.  There appears to be thrombus at right distal anastomosis, bilateral SFA's are occluded.  Severely diminished flow to both feet.  Final report pending.

## 2024-06-13 NOTE — ANESTHESIA POSTPROCEDURE EVALUATION
Department of Anesthesiology  Postprocedure Note    Patient: Tyler Franks  MRN: [id number]  YOB: 1965  Date of evaluation: 6/13/2024    Procedure Summary       Date: 06/13/24 Room / Location: [address]    Anesthesia Start: 1458 Anesthesia Stop: 1644    Procedure: RIGHT LOWER EXTERMITY ARTERIOGRAM (Right) Diagnosis:       PVD (peripheral vascular disease) (HCC)      (PVD (peripheral vascular disease) (HCC) [I73.9])    Surgeons: Radha Tapia DO Responsible Provider: Cm Quinn APRN - CRNA    Anesthesia Type: general ASA Status: 3 - Emergent            Anesthesia Type: No value filed.    Cathy Phase I: Cathy Score: 10    Cathy Phase II:      Anesthesia Post Evaluation    Patient location during evaluation: PACU  Patient participation: waiting for patient participation  Level of consciousness: responsive to physical stimuli  Pain score: 0  Airway patency: patent  Nausea & Vomiting: no nausea and no vomiting  Cardiovascular status: hemodynamically stable  Respiratory status: spontaneous ventilation and nonlabored ventilation  Hydration status: stable  Multimodal analgesia pain management approach    No notable events documented.

## 2024-06-13 NOTE — OP NOTE
Operative Note      Patient: Tyler Franks  YOB: 1965  MRN: [id number]    Date of Procedure: 6/13/2024    Pre-Op diagnosis: ax bifemoral bypass. PAD . Right leg ischemia    Post-Op Diagnosis: Same       Procedure: exploration of right femoral anastomosis. Right leg thrombectomy. Angiogram    Surgeon: Radha Tapia DO    Assistant:   Sara Teran    Anesthesia: general    Estimated Blood Loss (mL): 550    Complications: None    Specimens:   * No specimens in log *    Implants:  * No implants in log *      Drains:   Urinary Catheter 06/13/24 Dunn (Active)       [REMOVED] Urinary Catheter 06/10/24 Dunn (Removed)   Catheter Indications Perioperative use for selected surgical procedures 06/10/24 1640   Site Assessment IVAN 06/11/24 0414   Urine Color Yellow 06/11/24 0414   Urine Appearance Clear 06/11/24 0414   Urine Odor Malodorous 06/11/24 0414   Collection Container Standard 06/11/24 0414   Securement Method Securing device (Describe) 06/11/24 0414   Catheter Care  Soap and water 06/11/24 0414   Catheter Best Practices  Drainage tube clipped to bed;Catheter secured to thigh;Tamper seal intact;Bag below bladder;Bag not on floor;Lack of dependent loop in tubing;Drainage bag less than half full 06/11/24 0414   Status Draining 06/11/24 0414   Output (mL) 1050 mL 06/11/24 0414       Findings:  Thrombus in the right graft to femoral anastomosis. SFA chronically occluded    Indication: 58-year-old male day #4 status post axillary bifemoral bypass.  He was doing well up until today when his leg on the right side felt more cold and lost pulses distally.  He has chronic bilateral SFA occlusions.  He had arterial duplex performed on the right side and diagnosed with the clot at the right femoral anastomosis with poor flow distally.  He was discussed results of the arterial study.  He was discussed procedure to perform thrombectomy.  He was given risks benefits alternatives of the procedure.  She chose to

## 2024-06-13 NOTE — PLAN OF CARE
Back from Vascular lab, results discussed with . Orders received for Heparin drip,  consent for exploration and thrombectomy left groin in OR. Plan discussed with patient, questions answered. Patient voices understanding and is agreeable for procedure.

## 2024-06-13 NOTE — PLAN OF CARE
Problem: Discharge Planning  Goal: Discharge to home or other facility with appropriate resources  Outcome: Progressing     Problem: Pain  Goal: Verbalizes/displays adequate comfort level or baseline comfort level  Outcome: Progressing     Problem: ABCDS Injury Assessment  Goal: Absence of physical injury  Outcome: Progressing     Problem: Risk for Elopement  Goal: Patient will not exit the unit/facility without proper excort  Outcome: Progressing     Problem: Safety - Adult  Goal: Free from fall injury  Outcome: Progressing

## 2024-06-13 NOTE — ANESTHESIA PRE PROCEDURE
Department of Anesthesiology  Preprocedure Note       Name:  Tyler Franks   Age:  58 y.o.  :  1965                                          MRN:  [id number]         Date:  2024      Surgeon: Surgeon(s):  Radha Tapia DO    Procedure: Procedure(s):  EXPLORATION OF RIGHT LEG    Medications prior to admission:   Prior to Admission medications    Medication Sig Start Date End Date Taking? Authorizing Provider   finasteride (PROSCAR) 5 MG tablet Take 1 tablet by mouth daily    Yvan Chirinos MD   HYDROcodone-acetaminophen (NORCO)  MG per tablet Take 1 tablet by mouth every 6 hours as needed for Pain.    Yvan Chirinos MD   aspirin (SB LOW DOSE ASA EC) 81 MG EC tablet Take 1 tablet by mouth once for 1 dose 3/15/24 6/10/24  Radha Tapia DO   atorvastatin (LIPITOR) 40 MG tablet Take 1 tablet by mouth daily    Yvan Chirinos MD   losartan (COZAAR) 25 MG tablet Take 1 tablet by mouth daily    Yvan Chirinos MD   vitamin D (VITAMIN D3) 07161 UNIT CAPS Take 1 capsule by mouth daily    ProviderYvan MD       Current medications:    Current Facility-Administered Medications   Medication Dose Route Frequency Provider Last Rate Last Admin   • heparin (porcine) injection 4,800 Units  80 Units/kg IntraVENous PRN Radha Tapia DO       • heparin (porcine) injection 2,400 Units  40 Units/kg IntraVENous PRN Radha Tapia DO       • heparin 25,000 units in dextrose 5% 250 mL (premix) infusion  5-30 Units/kg/hr IntraVENous Continuous Radha Tapia DO 10.8 mL/hr at 24 1321 18 Units/kg/hr at 24 1321   • oxyCODONE-acetaminophen (PERCOCET)  MG per tablet 1 tablet  1 tablet Oral Q6H PRN Radha Tapia DO   1 tablet at 24 2253   • ondansetron (ZOFRAN) injection 4 mg  4 mg IntraVENous Q6H PRN Radha Tapia DO   4 mg at 24 0605   • Hydrocerin (EUCERIN) cream CREA   Topical Daily Radha Tapia DO   Given

## 2024-06-14 LAB
ALBUMIN SERPL-MCNC: 3.3 G/DL (ref 3.5–5.2)
ALP SERPL-CCNC: 73 U/L (ref 40–130)
ALT SERPL-CCNC: 13 U/L (ref 5–41)
ANION GAP SERPL CALCULATED.3IONS-SCNC: 16 MMOL/L (ref 7–19)
ANTI-XA UNFRAC HEPARIN: 0.55 IU/ML (ref 0.3–0.7)
ANTI-XA UNFRAC HEPARIN: 0.69 IU/ML (ref 0.3–0.7)
BACTERIA URNS QL MICRO: NEGATIVE /HPF
BILIRUB SERPL-MCNC: 0.4 MG/DL (ref 0.2–1.2)
BILIRUB UR QL STRIP: NEGATIVE
BUN SERPL-MCNC: 46 MG/DL (ref 6–20)
CLARITY UR: CLEAR
COLOR UR: YELLOW
CREAT SERPL-MCNC: 3.8 MG/DL (ref 0.5–1.2)
CREAT UR-MCNC: 26.6 MG/DL (ref 39–259)
CRYSTALS URNS MICRO: ABNORMAL /HPF
EPI CELLS #/AREA URNS AUTO: 1 /HPF (ref 0–5)
ERYTHROCYTE [DISTWIDTH] IN BLOOD BY AUTOMATED COUNT: 12.8 % (ref 11.5–14.5)
GLUCOSE SERPL-MCNC: 107 MG/DL (ref 74–109)
GLUCOSE UR STRIP.AUTO-MCNC: NEGATIVE MG/DL
HCT VFR BLD AUTO: 35.9 % (ref 42–52)
HGB BLD-MCNC: 11.7 G/DL (ref 14–18)
HGB UR STRIP.AUTO-MCNC: ABNORMAL MG/L
HYALINE CASTS #/AREA URNS AUTO: 1 /HPF (ref 0–8)
KETONES UR STRIP.AUTO-MCNC: NEGATIVE MG/DL
LEUKOCYTE ESTERASE UR QL STRIP.AUTO: NEGATIVE
MCH RBC QN AUTO: 30.8 PG (ref 27–31)
MCHC RBC AUTO-ENTMCNC: 32.6 G/DL (ref 33–37)
NITRITE UR QL STRIP.AUTO: NEGATIVE
PH UR STRIP.AUTO: 6.5 [PH] (ref 5–8)
PLATELET # BLD AUTO: 115 K/UL (ref 130–400)
PMV BLD AUTO: 12.2 FL (ref 9.4–12.4)
POTASSIUM SERPL-SCNC: 4.7 MMOL/L (ref 3.5–5)
PREALB SERPL-MCNC: 15 MG/DL (ref 20–40)
PROT SERPL-MCNC: 5.1 G/DL (ref 6.6–8.7)
PROT UR STRIP.AUTO-MCNC: 100 MG/DL
RBC # BLD AUTO: 3.8 M/UL (ref 4.7–6.1)
RBC #/AREA URNS AUTO: 28 /HPF (ref 0–4)
SODIUM SERPL-SCNC: 134 MMOL/L (ref 136–145)
SODIUM UR-SCNC: 38 MMOL/L
SP GR UR STRIP.AUTO: 1 (ref 1–1.03)
UROBILINOGEN UR STRIP.AUTO-MCNC: 0.2 E.U./DL
WBC # BLD AUTO: 12.6 K/UL (ref 4.8–10.8)
WBC #/AREA URNS AUTO: 4 /HPF (ref 0–5)

## 2024-06-14 PROCEDURE — 85520 HEPARIN ASSAY: CPT

## 2024-06-14 PROCEDURE — 36415 COLL VENOUS BLD VENIPUNCTURE: CPT

## 2024-06-14 PROCEDURE — 84134 ASSAY OF PREALBUMIN: CPT

## 2024-06-14 PROCEDURE — 81001 URINALYSIS AUTO W/SCOPE: CPT

## 2024-06-14 PROCEDURE — 84300 ASSAY OF URINE SODIUM: CPT

## 2024-06-14 PROCEDURE — 6370000000 HC RX 637 (ALT 250 FOR IP): Performed by: SURGERY

## 2024-06-14 PROCEDURE — 6370000000 HC RX 637 (ALT 250 FOR IP): Performed by: INTERNAL MEDICINE

## 2024-06-14 PROCEDURE — 2700000000 HC OXYGEN THERAPY PER DAY

## 2024-06-14 PROCEDURE — 1200000000 HC SEMI PRIVATE

## 2024-06-14 PROCEDURE — 2580000003 HC RX 258: Performed by: SURGERY

## 2024-06-14 PROCEDURE — 82570 ASSAY OF URINE CREATININE: CPT

## 2024-06-14 PROCEDURE — 85027 COMPLETE CBC AUTOMATED: CPT

## 2024-06-14 PROCEDURE — 99231 SBSQ HOSP IP/OBS SF/LOW 25: CPT | Performed by: SURGERY

## 2024-06-14 PROCEDURE — 80053 COMPREHEN METABOLIC PANEL: CPT

## 2024-06-14 RX ORDER — SODIUM BICARBONATE 650 MG/1
650 TABLET ORAL 2 TIMES DAILY
Status: DISCONTINUED | OUTPATIENT
Start: 2024-06-14 | End: 2024-06-15

## 2024-06-14 RX ORDER — ACETAMINOPHEN 325 MG/1
650 TABLET ORAL EVERY 6 HOURS PRN
Status: DISCONTINUED | OUTPATIENT
Start: 2024-06-14 | End: 2024-06-16 | Stop reason: HOSPADM

## 2024-06-14 RX ORDER — CLOPIDOGREL BISULFATE 75 MG/1
75 TABLET ORAL DAILY
Status: DISCONTINUED | OUTPATIENT
Start: 2024-06-14 | End: 2024-06-16 | Stop reason: HOSPADM

## 2024-06-14 RX ADMIN — Medication: at 08:36

## 2024-06-14 RX ADMIN — ACETAMINOPHEN 650 MG: 325 TABLET ORAL at 05:46

## 2024-06-14 RX ADMIN — APIXABAN 5 MG: 5 TABLET, FILM COATED ORAL at 10:29

## 2024-06-14 RX ADMIN — ATORVASTATIN CALCIUM 40 MG: 40 TABLET, FILM COATED ORAL at 08:34

## 2024-06-14 RX ADMIN — SODIUM CHLORIDE, PRESERVATIVE FREE 10 ML: 5 INJECTION INTRAVENOUS at 08:35

## 2024-06-14 RX ADMIN — SODIUM BICARBONATE 650 MG: 650 TABLET ORAL at 20:53

## 2024-06-14 RX ADMIN — LOSARTAN POTASSIUM 25 MG: 50 TABLET, FILM COATED ORAL at 08:34

## 2024-06-14 RX ADMIN — SODIUM BICARBONATE 650 MG: 650 TABLET ORAL at 12:53

## 2024-06-14 RX ADMIN — SODIUM CHLORIDE: 9 INJECTION, SOLUTION INTRAVENOUS at 01:57

## 2024-06-14 RX ADMIN — FINASTERIDE 5 MG: 5 TABLET, FILM COATED ORAL at 08:34

## 2024-06-14 RX ADMIN — OXYCODONE HYDROCHLORIDE AND ACETAMINOPHEN 1 TABLET: 10; 325 TABLET ORAL at 20:53

## 2024-06-14 RX ADMIN — OXYCODONE HYDROCHLORIDE AND ACETAMINOPHEN 1 TABLET: 10; 325 TABLET ORAL at 12:59

## 2024-06-14 RX ADMIN — CLOPIDOGREL BISULFATE 75 MG: 75 TABLET ORAL at 10:29

## 2024-06-14 RX ADMIN — APIXABAN 5 MG: 5 TABLET, FILM COATED ORAL at 20:53

## 2024-06-14 ASSESSMENT — PAIN DESCRIPTION - ORIENTATION
ORIENTATION: RIGHT;LEFT

## 2024-06-14 ASSESSMENT — PAIN SCALES - GENERAL
PAINLEVEL_OUTOF10: 4
PAINLEVEL_OUTOF10: 0
PAINLEVEL_OUTOF10: 9

## 2024-06-14 ASSESSMENT — PAIN DESCRIPTION - LOCATION
LOCATION: GROIN
LOCATION: GROIN

## 2024-06-14 ASSESSMENT — PAIN DESCRIPTION - DESCRIPTORS
DESCRIPTORS: SORE
DESCRIPTORS: ACHING
DESCRIPTORS: SORE

## 2024-06-14 NOTE — PLAN OF CARE
Problem: Discharge Planning  Goal: Discharge to home or other facility with appropriate resources  6/13/2024 2140 by Sola Reyes RN  Outcome: Progressing  6/13/2024 1846 by Norma Donald, RN  Outcome: Progressing     Problem: Pain  Goal: Verbalizes/displays adequate comfort level or baseline comfort level  6/13/2024 2140 by Sola Reyes RN  Outcome: Progressing  6/13/2024 1846 by Norma Donald, RN  Outcome: Progressing     Problem: ABCDS Injury Assessment  Goal: Absence of physical injury  6/13/2024 2140 by Sola Reyes RN  Outcome: Progressing  6/13/2024 1846 by Norma Donald, RN  Outcome: Progressing     Problem: Risk for Elopement  Goal: Patient will not exit the unit/facility without proper excort  6/13/2024 2140 by Sola Reyes RN  Outcome: Progressing  6/13/2024 1846 by Norma Donald, RN  Outcome: Progressing     Problem: Safety - Adult  Goal: Free from fall injury  6/13/2024 2140 by Sola Reyes RN  Outcome: Progressing  6/13/2024 1846 by Norma Donald, RN  Outcome: Progressing

## 2024-06-14 NOTE — CONSULTS
Renal Consult Note    Thank you to requesting provider: Dr Tapia, for asking us to see Tyler Franks    Reason for consultation:  TEMITOPE, CKD    Chief Complaint: Leg claudication    History of Presenting Illness      Patient is a 58-year-old pleasant -American man with a past medical history of hypertension, peripheral vascular disease and CKD stage IIIb.  He is followed at the renal clinic by [facility].  He was experiencing claudication of the lower extremities and had to undergo vascular studies and intervention.  He is status post axillary bifemoral bypass procedure on Nak[place] 10 and then on June 13 he underwent exploration of right femoral anastomosis with right leg thrombectomy and angiogram.  His serum creatinine has risen from a baseline of 2.4-2.7 and then 3.8.  His GFR has dropped from 30-17.  Renal service was consulted to manage his TEMITOPE on CKD.  When I interviewed the patient he denied any major pain in his leg.  He continues to have good urine output.  He has no chest pain, nausea or abdominal pain.    Past Medical/Surgical History      Active Ambulatory Problems     Diagnosis Date Noted    PVD (peripheral vascular disease) ([facility]) 03/15/2024    Abnormal stress test 04/23/2024    Peripheral vascular disease, unspecified ([facility]) 05/07/2024     Resolved Ambulatory Problems     Diagnosis Date Noted    No Resolved Ambulatory Problems     Past Medical History:   Diagnosis Date    CKD (chronic kidney disease) stage 3, GFR 30-59 ml/min ([facility])     Full dentures     Hypertension          Review of Systems     Constitutional:  No weight loss, no fever/chills  Eyes:  No eye pain, no eye redness  Cardiovascular:  No chest pain, no worsening of edema  Respiratory:  No hemoptysis, no stidor  Gastrointestinal:  No blood in stool, no n/v, no diarrhea  Genitoruinary:  No hematuria, no difficulty with urination  Musculoskeletal:  No joint swelling, no redness  Integumentary:  No Rash, no itching  Neurological:

## 2024-06-15 ENCOUNTER — HOSPITAL ENCOUNTER (INPATIENT)
Dept: VASCULAR LAB | Age: 59
Discharge: HOME | DRG: 253 | End: 2024-06-17
Attending: SURGERY
Payer: MEDICAID

## 2024-06-15 LAB
ANION GAP SERPL CALCULATED.3IONS-SCNC: 11 MMOL/L (ref 7–19)
BUN SERPL-MCNC: 44 MG/DL (ref 6–20)
CALCIUM SERPL-MCNC: 8.2 MG/DL (ref 8.6–10)
CHLORIDE SERPL-SCNC: 107 MMOL/L (ref 98–111)
CO2 SERPL-SCNC: 19 MMOL/L (ref 22–29)
CREAT SERPL-MCNC: 3.7 MG/DL (ref 0.5–1.2)
GLUCOSE SERPL-MCNC: 114 MG/DL (ref 74–109)
IRON SATN MFR SERPL: 8 % (ref 14–50)
IRON SERPL-MCNC: 13 UG/DL (ref 59–158)
POTASSIUM SERPL-SCNC: 4.7 MMOL/L (ref 3.5–5)
PTH-INTACT SERPL-MCNC: 80.3 PG/ML (ref 15–65)
SODIUM SERPL-SCNC: 137 MMOL/L (ref 136–145)
TIBC SERPL-MCNC: 159 UG/DL (ref 250–400)
URATE SERPL-MCNC: 6.8 MG/DL (ref 3.4–7)

## 2024-06-15 PROCEDURE — 1200000000 HC SEMI PRIVATE

## 2024-06-15 PROCEDURE — 83970 ASSAY OF PARATHORMONE: CPT

## 2024-06-15 PROCEDURE — 6370000000 HC RX 637 (ALT 250 FOR IP): Performed by: SURGERY

## 2024-06-15 PROCEDURE — 94760 N-INVAS EAR/PLS OXIMETRY 1: CPT

## 2024-06-15 PROCEDURE — 2580000003 HC RX 258: Performed by: INTERNAL MEDICINE

## 2024-06-15 PROCEDURE — 83540 ASSAY OF IRON: CPT

## 2024-06-15 PROCEDURE — 93985 DUP-SCAN HEMO COMPL BI STD: CPT

## 2024-06-15 PROCEDURE — 2580000003 HC RX 258: Performed by: SURGERY

## 2024-06-15 PROCEDURE — 83550 IRON BINDING TEST: CPT

## 2024-06-15 PROCEDURE — 99231 SBSQ HOSP IP/OBS SF/LOW 25: CPT | Performed by: SURGERY

## 2024-06-15 PROCEDURE — 6360000002 HC RX W HCPCS: Performed by: SURGERY

## 2024-06-15 PROCEDURE — 36415 COLL VENOUS BLD VENIPUNCTURE: CPT

## 2024-06-15 PROCEDURE — 80048 BASIC METABOLIC PNL TOTAL CA: CPT

## 2024-06-15 PROCEDURE — 84550 ASSAY OF BLOOD/URIC ACID: CPT

## 2024-06-15 PROCEDURE — 6370000000 HC RX 637 (ALT 250 FOR IP): Performed by: INTERNAL MEDICINE

## 2024-06-15 RX ORDER — FERROUS SULFATE 325(65) MG
325 TABLET ORAL 2 TIMES DAILY WITH MEALS
Status: DISCONTINUED | OUTPATIENT
Start: 2024-06-15 | End: 2024-06-16 | Stop reason: HOSPADM

## 2024-06-15 RX ORDER — SODIUM BICARBONATE 650 MG/1
650 TABLET ORAL 3 TIMES DAILY
Status: DISCONTINUED | OUTPATIENT
Start: 2024-06-15 | End: 2024-06-16 | Stop reason: HOSPADM

## 2024-06-15 RX ADMIN — FINASTERIDE 5 MG: 5 TABLET, FILM COATED ORAL at 08:31

## 2024-06-15 RX ADMIN — OXYCODONE HYDROCHLORIDE AND ACETAMINOPHEN 1 TABLET: 10; 325 TABLET ORAL at 21:06

## 2024-06-15 RX ADMIN — FERROUS SULFATE TAB 325 MG (65 MG ELEMENTAL FE) 325 MG: 325 (65 FE) TAB at 10:34

## 2024-06-15 RX ADMIN — ATORVASTATIN CALCIUM 40 MG: 40 TABLET, FILM COATED ORAL at 08:30

## 2024-06-15 RX ADMIN — SODIUM CHLORIDE: 9 INJECTION, SOLUTION INTRAVENOUS at 08:37

## 2024-06-15 RX ADMIN — APIXABAN 5 MG: 5 TABLET, FILM COATED ORAL at 08:31

## 2024-06-15 RX ADMIN — SODIUM BICARBONATE 650 MG: 650 TABLET ORAL at 21:05

## 2024-06-15 RX ADMIN — SODIUM BICARBONATE 650 MG: 650 TABLET ORAL at 08:31

## 2024-06-15 RX ADMIN — Medication: at 08:32

## 2024-06-15 RX ADMIN — Medication: at 08:31

## 2024-06-15 RX ADMIN — SODIUM CHLORIDE: 9 INJECTION, SOLUTION INTRAVENOUS at 01:03

## 2024-06-15 RX ADMIN — CLOPIDOGREL BISULFATE 75 MG: 75 TABLET ORAL at 08:31

## 2024-06-15 RX ADMIN — SODIUM CHLORIDE: 9 INJECTION, SOLUTION INTRAVENOUS at 17:56

## 2024-06-15 RX ADMIN — ONDANSETRON 4 MG: 2 INJECTION INTRAMUSCULAR; INTRAVENOUS at 17:53

## 2024-06-15 RX ADMIN — LOSARTAN POTASSIUM 25 MG: 50 TABLET, FILM COATED ORAL at 08:30

## 2024-06-15 RX ADMIN — FERROUS SULFATE TAB 325 MG (65 MG ELEMENTAL FE) 325 MG: 325 (65 FE) TAB at 16:36

## 2024-06-15 RX ADMIN — APIXABAN 5 MG: 5 TABLET, FILM COATED ORAL at 21:06

## 2024-06-15 RX ADMIN — SODIUM BICARBONATE 650 MG: 650 TABLET ORAL at 14:13

## 2024-06-15 NOTE — PLAN OF CARE
Problem: Discharge Planning  Goal: Discharge to home or other facility with appropriate resources  Outcome: Progressing     Problem: Pain  Goal: Verbalizes/displays adequate comfort level or baseline comfort level  Outcome: Progressing     Problem: ABCDS Injury Assessment  Goal: Absence of physical injury  Outcome: Progressing     Problem: Risk for Elopement  Goal: Patient will not exit the unit/facility without proper excort  Outcome: Progressing     Problem: Safety - Adult  Goal: Free from fall injury  Outcome: Progressing     Problem: Skin/Tissue Integrity  Goal: Absence of new skin breakdown  Description: 1.  Monitor for areas of redness and/or skin breakdown  2.  Assess vascular access sites hourly  3.  Every 4-6 hours minimum:  Change oxygen saturation probe site  4.  Every 4-6 hours:  If on nasal continuous positive airway pressure, respiratory therapy assess nares and determine need for appliance change or resting period.  Outcome: Progressing

## 2024-06-15 NOTE — PROGRESS NOTES
Vascular Lab Preliminary notes:    Bilateral Upper extremity venous and arterial mapping done for dialysis access planning.    Final Report Pending

## 2024-06-16 VITALS
WEIGHT: 132 LBS | HEART RATE: 97 BPM | OXYGEN SATURATION: 97 % | DIASTOLIC BLOOD PRESSURE: 79 MMHG | BODY MASS INDEX: 21.21 KG/M2 | TEMPERATURE: 99 F | HEIGHT: 66 IN | SYSTOLIC BLOOD PRESSURE: 136 MMHG | RESPIRATION RATE: 16 BRPM

## 2024-06-16 LAB
ANION GAP SERPL CALCULATED.3IONS-SCNC: 10 MMOL/L (ref 7–19)
BUN SERPL-MCNC: 37 MG/DL (ref 6–20)
CALCIUM SERPL-MCNC: 8.2 MG/DL (ref 8.6–10)
CHLORIDE SERPL-SCNC: 111 MMOL/L (ref 98–111)
CO2 SERPL-SCNC: 20 MMOL/L (ref 22–29)
CREAT SERPL-MCNC: 3.3 MG/DL (ref 0.5–1.2)
ECHO BSA: 1.67 M2
GLUCOSE SERPL-MCNC: 102 MG/DL (ref 74–109)
POTASSIUM SERPL-SCNC: 4.5 MMOL/L (ref 3.5–5)
SODIUM SERPL-SCNC: 141 MMOL/L (ref 136–145)
VAS LEFT AXILLARY VEIN DIAMETER: 5.9 MM
VAS LEFT BASILIC VEIN LOWER ARM MID DIAM: 1.4 MM
VAS LEFT BASILIC VEIN UPPER ARM DIST DIAM: 2.1 MM
VAS LEFT BASILIC VEIN UPPER ARM MID DIAM: 2.4 MM
VAS LEFT BASLIC VEIN LOWER ARM DIST DIAM: 0.7 MM
VAS LEFT BRACHIAL DIST AP DIAM: 4.4 CM
VAS LEFT BRACHIAL VEIN 1 DIAM: 4.1 MM
VAS LEFT BRACHIAL VEIN 2 DIAM: 3.1 MM
VAS LEFT CEPHALIC VEIN LOWER ARM DIST DIAM: 0 MM
VAS LEFT CEPHALIC VEIN LOWER ARM MID DIAM: 1.7 MM
VAS LEFT CEPHALIC VEIN UPPER ARM DIST DIAM: 2 MM
VAS LEFT CEPHALIC VEIN UPPER ARM MID DIAM: 1.5 MM
VAS LEFT CEPHALIC VEIN UPPER ARM PROX DIAM: 2.5 MM
VAS LEFT RADIAL DIST AP DIAM: 2.5 CM
VAS LEFT ULNAR DIST AP DIAM: 1.3 CM
VAS RIGHT AXILLARY VEIN DIAMETER: 4.6 MM
VAS RIGHT BASILIC VEIN LOWER ARM MID DIAM: 0.7 MM
VAS RIGHT BASILIC VEIN UPPER ARM DIST DIAM: 1.9 MM
VAS RIGHT BASILIC VEIN UPPER ARM MID DIAM: 2.7 MM
VAS RIGHT BASLIC VEIN LOWER ARM DIST DIAM: 0.9 MM
VAS RIGHT BRACHIAL DIST AP DIAM: 4 CM
VAS RIGHT BRACHIAL VEIN 1 DIAM: 3.8 MM
VAS RIGHT BRACHIAL VEIN 2 DIAM: 3.8 MM
VAS RIGHT CEPHALIC VEIN LOWER ARM DIST DIAM: 0 MM
VAS RIGHT CEPHALIC VEIN LOWER ARM MID DIAM: 0.7 MM
VAS RIGHT CEPHALIC VEIN UPPER ARM DIST DIAM: 2.3 MM
VAS RIGHT CEPHALIC VEIN UPPER ARM MID DIAM: 1.8 MM
VAS RIGHT CEPHALIC VEIN UPPER ARM PROX DIAM: 2.9 MM
VAS RIGHT RADIAL DIST AP DIAM: 2 CM
VAS RIGHT ULNAR DIST AP DIAM: 1.8 CM

## 2024-06-16 PROCEDURE — 6370000000 HC RX 637 (ALT 250 FOR IP): Performed by: SURGERY

## 2024-06-16 PROCEDURE — 6370000000 HC RX 637 (ALT 250 FOR IP): Performed by: INTERNAL MEDICINE

## 2024-06-16 PROCEDURE — 36415 COLL VENOUS BLD VENIPUNCTURE: CPT

## 2024-06-16 PROCEDURE — 2580000003 HC RX 258: Performed by: SURGERY

## 2024-06-16 PROCEDURE — 2580000003 HC RX 258: Performed by: INTERNAL MEDICINE

## 2024-06-16 PROCEDURE — 80048 BASIC METABOLIC PNL TOTAL CA: CPT

## 2024-06-16 PROCEDURE — 94760 N-INVAS EAR/PLS OXIMETRY 1: CPT

## 2024-06-16 PROCEDURE — 99238 HOSP IP/OBS DSCHRG MGMT 30/<: CPT | Performed by: SURGERY

## 2024-06-16 RX ADMIN — Medication: at 07:52

## 2024-06-16 RX ADMIN — Medication: at 07:53

## 2024-06-16 RX ADMIN — SODIUM BICARBONATE 650 MG: 650 TABLET ORAL at 07:51

## 2024-06-16 RX ADMIN — LOSARTAN POTASSIUM 25 MG: 50 TABLET, FILM COATED ORAL at 07:50

## 2024-06-16 RX ADMIN — SODIUM CHLORIDE: 9 INJECTION, SOLUTION INTRAVENOUS at 03:20

## 2024-06-16 RX ADMIN — CLOPIDOGREL BISULFATE 75 MG: 75 TABLET ORAL at 07:51

## 2024-06-16 RX ADMIN — APIXABAN 5 MG: 5 TABLET, FILM COATED ORAL at 07:50

## 2024-06-16 RX ADMIN — ATORVASTATIN CALCIUM 40 MG: 40 TABLET, FILM COATED ORAL at 07:50

## 2024-06-16 RX ADMIN — SODIUM CHLORIDE, PRESERVATIVE FREE 10 ML: 5 INJECTION INTRAVENOUS at 07:51

## 2024-06-16 RX ADMIN — FINASTERIDE 5 MG: 5 TABLET, FILM COATED ORAL at 07:50

## 2024-06-16 RX ADMIN — FERROUS SULFATE TAB 325 MG (65 MG ELEMENTAL FE) 325 MG: 325 (65 FE) TAB at 07:51

## 2024-06-16 NOTE — DISCHARGE SUMMARY
extra 2 days.  His kidneys improved and he was cleared for discharge home to follow-up with his usual nephrology NP.    Consults: nephrology    Significant Diagnostic Studies: labs: CBC, CMP and arterial duplex    Treatments: IV hydration and surgery: as above    Disposition: home    Patient Instructions:   meds- see medication reconciliation   Activity: activity as tolerated  Diet: regular diet  Wound Care: as directed    Follow-up with vascular surgery in 2 weeks, f/u with nephrology.    Signed:  Radha Tapia DO  6/16/2024  11:45 AM

## 2024-06-16 NOTE — PLAN OF CARE
Problem: Discharge Planning  Goal: Discharge to home or other facility with appropriate resources  Outcome: Adequate for Discharge     Problem: Pain  Goal: Verbalizes/displays adequate comfort level or baseline comfort level  Outcome: Adequate for Discharge     Problem: ABCDS Injury Assessment  Goal: Absence of physical injury  Outcome: Adequate for Discharge     Problem: Risk for Elopement  Goal: Patient will not exit the unit/facility without proper excort  Outcome: Adequate for Discharge     Problem: Safety - Adult  Goal: Free from fall injury  Outcome: Adequate for Discharge     Problem: Skin/Tissue Integrity  Goal: Absence of new skin breakdown  Description: 1.  Monitor for areas of redness and/or skin breakdown  2.  Assess vascular access sites hourly  3.  Every 4-6 hours minimum:  Change oxygen saturation probe site  4.  Every 4-6 hours:  If on nasal continuous positive airway pressure, respiratory therapy assess nares and determine need for appliance change or resting period.  Outcome: Adequate for Discharge

## 2024-06-16 NOTE — PROGRESS NOTES
4 Eyes Skin Assessment     NAME:  Tyler Franks  YOB: 1965  MEDICAL RECORD NUMBER:  [id number]    The patient is being assessed for  Post-Op Surgical    I agree that at least one RN has performed a thorough Head to Toe Skin Assessment on the patient. ALL assessment sites listed below have been assessed.      Areas assessed by both nurses:    Head, Face, Ears, Shoulders, Back, Chest, Arms, Elbows, Hands, Sacrum. Buttock, Coccyx, Ischium, and Legs. Feet and Heels        Does the Patient have a Wound? No noted wound(s)       Ruben Prevention initiated by RN: Yes  Wound Care Orders initiated by RN: No    Pressure Injury (Stage 3,4, Unstageable, DTI, NWPT, and Complex wounds) if present, place Wound referral order by RN under : No    New Ostomies, if present place, Ostomy referral order under : No     Nurse 1 eSignature: Electronically signed by Alondra Galan RN on 6/10/24 at 5:02 PM CDT    **SHARE this note so that the co-signing nurse can place an eSignature**    Nurse 2 eSignature: {Esignature:[id number]}   
Bilateral dp and pt marked with doppler  
CLINICAL PHARMACY NOTE: MEDS TO BEDS    Total # of Prescriptions Filled: 2   The following medications were delivered to the patient:  Current Discharge Medication List        Eliquis 5 MG  Plavix 75 MG    Additional Documentation:    Delivered RX to patient bedside. No copay.  
Left DP marked with doppler  
Tyler Franks arrived to room # 324.   Presented with: ax bi fem  Mental Status: Patient is oriented, alert, and coherent.   Vitals:    06/10/24 1639   BP: (!) 153/93   Pulse: 65   Resp: 16   Temp:    SpO2: 94%     Patient safety contract and falls prevention contract reviewed with patient Yes.  Oriented Patient and Family to room.  Call light within reach. Yes.  Needs, issues or concerns expressed at this time: no.      Electronically signed by Alondra Galan RN on 6/10/2024 at 5:01 PM   
Vascular Surgery  Dr. Radha Tapia   Daily Progress Note    Pt Name: Tyler Franks  Medical Record Number: [id number]  Date of Birth 1965   Today's Date: 6/12/2024    SUBJECTIVE:     Patient was seen and examined.  Stating he had nausea earlier, this am, thinks it may be the pain medication. Asking if it could be changed.    OBJECTIVE:     Patient Vitals for the past 24 hrs:   BP Temp Temp src Pulse Resp SpO2   06/12/24 0845 (!) 140/90 97.1 °F (36.2 °C) Oral 89 18 94 %   06/12/24 0757 -- -- -- -- -- 96 %   06/12/24 0624 (!) 152/90 -- -- -- -- --   06/12/24 0546 (!) 147/97 97.9 °F (36.6 °C) Oral 86 16 96 %   06/12/24 0252 -- -- -- -- 16 --   06/11/24 2005 -- -- -- -- -- 94 %   06/11/24 1703 (!) 148/86 98.6 °F (37 °C) Oral 90 18 95 %       Intake/Output Summary (Last 24 hours) at 6/12/2024 1400  Last data filed at 6/12/2024 0225  Gross per 24 hour   Intake --   Output 650 ml   Net -650 ml     In: -   Out: 1600 [Urine:1600]    I/O last 3 completed shifts:  In: -   Out: 4700 [Urine:4700]     Date 06/12/24 0000 - 06/12/24 2359   Shift 9926-0132 6735-3455 7076-1423 24 Hour Total   INTAKE   Shift Total(mL/kg)       OUTPUT   Urine(mL/kg/hr) 300(0.6)   300   Shift Total(mL/kg) 300(5)   300(5)   Weight (kg) 59.9 59.9 59.9 59.9       Wt Readings from Last 3 Encounters:   06/10/24 59.9 kg (132 lb)   06/03/24 59.9 kg (132 lb)   05/28/24 60.2 kg (132 lb 11.2 oz)      Body mass index is 21.31 kg/m².     Diet: ADULT DIET; Regular    MEDS:     Scheduled Meds:   Hydrocerin   Topical Daily    urea   Topical Daily    aspirin  81 mg Oral Once    atorvastatin  40 mg Oral Daily    finasteride  5 mg Oral Daily    losartan  25 mg Oral Daily    sodium chloride flush  5-40 mL IntraVENous 2 times per day     Continuous Infusions:   sodium chloride       PRN Meds:oxyCODONE-acetaminophen, 1 tablet, Q6H PRN  ondansetron, 4 mg, Q6H PRN  sodium chloride flush, 5-40 mL, PRN  sodium chloride, , PRN        PHYSICAL EXAM: 
Vascular Surgery  Dr. Radha Tapia   Daily Progress Note    Pt Name: Tyler Franks  Medical Record Number: [id number]  Date of Birth 1965   Today's Date: 6/14/2024    SUBJECTIVE:     Patient was seen and examined.  Stating his incision lines are very tender, feels okay and no nausea    OBJECTIVE:     Patient Vitals for the past 24 hrs:   BP Temp Temp src Pulse Resp SpO2   06/14/24 0758 106/71 98.1 °F (36.7 °C) -- 97 18 94 %   06/14/24 0506 108/68 97.7 °F (36.5 °C) Temporal 92 16 99 %   06/13/24 2000 (!) 120/90 -- -- 82 16 96 %   06/13/24 1803 118/89 97.9 °F (36.6 °C) -- (!) 103 18 94 %   06/13/24 1715 -- 98.6 °F (37 °C) Temporal 99 20 93 %   06/13/24 1705 129/75 -- -- (!) 102 20 91 %   06/13/24 1700 (!) 128/106 -- -- 94 19 95 %   06/13/24 1656 117/71 -- -- 96 15 93 %   06/13/24 1655 -- -- -- 94 18 93 %   06/13/24 1650 108/88 -- -- 90 16 93 %   06/13/24 1644 108/88 99 °F (37.2 °C) Temporal 95 15 94 %   06/13/24 1455 -- -- -- 100 16 100 %   06/13/24 1450 -- -- -- 93 18 94 %   06/13/24 1445 (!) 156/81 -- -- (!) 101 17 94 %   06/13/24 1440 -- -- -- (!) 103 11 94 %       Intake/Output Summary (Last 24 hours) at 6/14/2024 0848  Last data filed at 6/14/2024 0506  Gross per 24 hour   Intake 325 ml   Output 700 ml   Net -375 ml     In: 325 [I.V.:325]  Out: 700 [Urine:650]    I/O last 3 completed shifts:  In: 325 [I.V.:325]  Out: 1300 [Urine:1250; Blood:50]     Date 06/14/24 0000 - 06/14/24 2359   Shift 7894-9829 7518-5742 8813-3428 24 Hour Total   INTAKE   Shift Total(mL/kg)       OUTPUT   Urine(mL/kg/hr) 650(1.4)   650   Shift Total(mL/kg) 650(10.9)   650(10.9)   Weight (kg) 59.9 59.9 59.9 59.9     Wt Readings from Last 3 Encounters:   06/10/24 59.9 kg (132 lb)   06/03/24 59.9 kg (132 lb)   05/28/24 60.2 kg (132 lb 11.2 oz)      Body mass index is 21.31 kg/m².     Diet: ADULT DIET; Regular    MEDS:     Scheduled Meds:   apixaban  5 mg Oral BID    clopidogrel  75 mg Oral Daily    sodium chloride flush  5-40 mL 
Vascular Surgery -  Radha Tapia DO M.D.   Daily Progress Note    Pt Name: Tyler Franks  Medical Record Number: [id number]  Date of Birth 1965   Today's Date: 6/15/2024    Chief Complaint:  No chief complaint on file.      SUBJECTIVE:     Patient was seen and examined.  Pain is  controlled  No complaints     OBJECTIVE:     Patient Vitals for the past 24 hrs:   BP Temp Temp src Pulse Resp SpO2   06/15/24 0815 (!) 158/73 -- -- 76 -- --   06/15/24 0717 -- -- -- -- -- 95 %   06/15/24 0634 (!) 142/89 98.4 °F (36.9 °C) Oral 94 16 96 %   06/15/24 0045 (!) 153/85 97.5 °F (36.4 °C) Oral 90 18 98 %   06/14/24 2239 (!) 167/83 98.2 °F (36.8 °C) -- (!) 102 18 94 %   06/14/24 2100 (!) 143/94 97.7 °F (36.5 °C) Oral 98 18 95 %   06/14/24 1709 (!) 123/98 98.2 °F (36.8 °C) -- 90 18 95 %   06/14/24 1259 -- -- -- -- 18 --   06/14/24 1245 (!) 142/100 98.3 °F (36.8 °C) Oral 89 18 95 %         Intake/Output Summary (Last 24 hours) at 6/15/2024 1124  Last data filed at 6/15/2024 1116  Gross per 24 hour   Intake --   Output 2250 ml   Net -2250 ml       In: -   Out: 2250 [Urine:2250]    I/O last 3 completed shifts:  In: -   Out: 1950 [Urine:1950]     Date 06/15/24 0000 - 06/15/24 2359   Shift 7014-1892 4453-8426 5514-3392 24 Hour Total   INTAKE   Shift Total(mL/kg)       OUTPUT   Urine(mL/kg/hr) 500(1) 950  1450   Shift Total(mL/kg) 500(8.4) 950(15.9)  1450(24.2)   Weight (kg) 59.9 59.9 59.9 59.9       Wt Readings from Last 3 Encounters:   06/10/24 59.9 kg (132 lb)   06/03/24 59.9 kg (132 lb)   05/28/24 60.2 kg (132 lb 11.2 oz)        Body mass index is 21.31 kg/m².     Diet: ADULT DIET; Regular        MEDS:     Scheduled Meds:   sodium bicarbonate  650 mg Oral TID    ferrous sulfate  325 mg Oral BID WC    apixaban  5 mg Oral BID    clopidogrel  75 mg Oral Daily    sodium chloride flush  5-40 mL IntraVENous 2 times per day    Hydrocerin   Topical Daily    urea   Topical Daily    aspirin  81 mg Oral Once    atorvastatin  40 mg 
urination  Musculoskeletal:  No joint swelling, no redness  Integumentary:  No Rash, no itching  Neurological:  No focal weakness, No new sensory deficit  Psychiatric:  No depression, no confusion  Endocrine:  No polyuria, no polydipsia       Medications        Current Facility-Administered Medications:     acetaminophen (TYLENOL) tablet 650 mg, 650 mg, Oral, Q6H PRN, Regina, Radha, DO, 650 mg at 06/14/24 0546    apixaban (ELIQUIS) tablet 5 mg, 5 mg, Oral, BID, Ivanlaisha, Radha, DO, 5 mg at 06/15/24 0831    clopidogrel (PLAVIX) tablet 75 mg, 75 mg, Oral, Daily, Ivanlaisha, Radha, DO, 75 mg at 06/15/24 0831    sodium bicarbonate tablet 650 mg, 650 mg, Oral, BID, Tera Ding MD, 650 mg at 06/15/24 0831    0.9 % sodium chloride infusion, , IntraVENous, Continuous, Radha Tapia, DO, Last Rate: 125 mL/hr at 06/15/24 0837, New Bag at 06/15/24 0837    sodium chloride flush 0.9 % injection 5-40 mL, 5-40 mL, IntraVENous, 2 times per day, Radha Tapia, DO, 10 mL at 06/14/24 0835    sodium chloride flush 0.9 % injection 5-40 mL, 5-40 mL, IntraVENous, PRN, Radha Tapia, DO    0.9 % sodium chloride infusion, , IntraVENous, PRN, Ivileana, Radha, DO    oxyCODONE-acetaminophen (PERCOCET)  MG per tablet 1 tablet, 1 tablet, Oral, Q6H PRN, IvRadha tejeda, DO, 1 tablet at 06/14/24 2053    ondansetron (ZOFRAN) injection 4 mg, 4 mg, IntraVENous, Q6H PRN, IvRadha tejeda, DO, 4 mg at 06/12/24 0605    Hydrocerin (EUCERIN) cream CREA, , Topical, Daily, IvRadha tejeda, DO, Given at 06/15/24 0832    urea (CARMOL) 20 % cream, , Topical, Daily, Ivileana, Radha, DO, Given at 06/15/24 0831    aspirin EC tablet 81 mg, 81 mg, Oral, Once, Radha Tapia DO    atorvastatin (LIPITOR) tablet 40 mg, 40 mg, Oral, Daily, Radha Tapia DO, 40 mg at 06/15/24 0830    finasteride (PROSCAR) tablet 5 mg, 5 mg, Oral, Daily, Radha Tapia DO, 5 mg at 06/15/24 0831    losartan 
Image: Wound   Left lateral heel wound   06/11/2024 08:58   Attached To:   Hospital Encounter on 6/10/24   Source Information          Document Information    Wound Care Image: Wound   Left plantar forefoot photo   06/11/2024 08:59   Attached To:   Hospital Encounter on 6/10/24   Source Information    LABS:     CBC:   Recent Labs     06/10/24  0740 06/10/24  1724 06/11/24  0355   WBC 10.1 13.1* 10.6   RBC 4.46* 3.86* 3.85*   HGB 13.8* 11.9* 11.8*   HCT 42.5 38.0* 36.4*   MCV 95.3* 98.4* 94.5*   MCH 30.9 30.8 30.6   MCHC 32.5* 31.3* 32.4*   RDW 13.3 13.3 13.2    134 124*   MPV 11.6 11.5 12.2      Last 3 CMP:   Recent Labs     06/10/24  1344 06/10/24  1440   K 7.1 6.3        Calcium:   Lab Results   Component Value Date/Time    CALCIUM 9.3 06/03/2024 08:50 AM    CALCIUM 9.7 05/28/2024 01:15 PM    CALCIUM 8.9 05/19/2024 06:26 PM        DVT prophylaxis:                                  [x] SCDs                                  ASSESSMENT:     Procedure 6/10/24: AXILLARY BI-FEMORAL BYPASS  HD # 1  Active Hospital Problems    Diagnosis Date Noted    PAD (peripheral artery disease) ([facility]) [I73.9] 06/10/2024    Peripheral vascular disease, unspecified ([facility]) [I73.9] 05/07/2024       Chief Complaint:  No chief complaint on file.      PLAN:     Post op TATYANA's  Out of bed with assist  Incentive spirometry  ALEX  5.   Urea cream to bilateral feet daily (recommend Flexitol Heel Challis discussed with patient, can get OTC)                       
finasteride (PROSCAR) tablet 5 mg, 5 mg, Oral, Daily, Ivileana, Radha, DO, 5 mg at 06/16/24 0750    losartan (COZAAR) tablet 25 mg, 25 mg, Oral, Daily, Ivanukoff, Radha, DO, 25 mg at 06/16/24 0750  Outpatient Medications Marked as Taking for the 6/10/24 encounter (Hospital Encounter)   Medication Sig Dispense Refill    apixaban (ELIQUIS) 5 MG TABS tablet Take 1 tablet by mouth 2 times daily 60 tablet 5       Allergies   Patient has no known allergies.    Family History       Family History   Problem Relation Age of Onset    High Cholesterol Mother     High Blood Pressure Mother     Kidney Disease Father     High Cholesterol Father     High Blood Pressure Father      Family history negative for kidney disease.     Social History      Social History     Socioeconomic History    Marital status: Single     Spouse name: None    Number of children: None    Years of education: None    Highest education level: None   Tobacco Use    Smoking status: Former     Current packs/day: 1.00     Average packs/day: 1 pack/day for 25.5 years (25.5 ttl pk-yrs)     Types: Cigarettes     Start date: 1/1/1999     Passive exposure: Past    Smokeless tobacco: Never   Substance and Sexual Activity    Alcohol use: Not Currently    Drug use: Not Currently     Social Determinants of Health     Financial Resource Strain: Low Risk  (6/10/2024)    Overall Financial Resource Strain (CARDIA)     Difficulty of Paying Living Expenses: Not hard at all   Food Insecurity: No Food Insecurity (6/10/2024)    Hunger Vital Sign     Worried About Running Out of Food in the Last Year: Never true     Ran Out of Food in the Last Year: Never true   Transportation Needs: No Transportation Needs (6/10/2024)    Transportation Problems ([facility] HRSN)     In the past 12 months, has lack of reliable transportation kept you from medical appointments, meetings, work or from getting things needed for daily living?: No   Physical Activity: Unknown (6/10/2024)    Physical 
status and right foot cold to touch  Daily dressing changes to groins and right upper chest incision  3.   Urea cream to bilateral feet daily, ordered from Pharmacy, (recommend Flexitol Heel Gause discussed with patient, can get OTC).   4.   Pain medication changed yesterday, no further complaints of nausea

## 2024-06-18 ENCOUNTER — TELEPHONE (OUTPATIENT)
Dept: VASCULAR SURGERY | Age: 59
End: 2024-06-18

## 2024-06-18 RX ORDER — CLOPIDOGREL BISULFATE 75 MG/1
TABLET ORAL
COMMUNITY
Start: 2024-06-14

## 2024-06-18 NOTE — TELEPHONE ENCOUNTER
The patient's daughter called in to say that her Father was not given any bandages at discharge and she was wanting to confirm what medications the patient was supposed to be on.  I spoke with Tasia Gamez RN and she stated that he was left a large amount of bandages in his drawer prior to her leaving on Friday before discharge.  She said he can use 4 X 4 and 4 X 8 bandages if he didn't get them.    He was also prescribed 5 MG of Eliquis daily and 75 MG of Plavix daily that was delivered to his bed side.  He should have gone home with both of those prescriptions per Dr. Tapia and Tasia Gamez RN.     I called the patient back and let her know that the patient was sent home with both medications.  I left our office number should she have questions or didn't receive the medicine.

## 2024-06-27 ENCOUNTER — OFFICE VISIT (OUTPATIENT)
Dept: VASCULAR SURGERY | Age: 59
End: 2024-06-27

## 2024-06-27 VITALS
SYSTOLIC BLOOD PRESSURE: 151 MMHG | OXYGEN SATURATION: 100 % | TEMPERATURE: 98.7 F | DIASTOLIC BLOOD PRESSURE: 88 MMHG | HEART RATE: 85 BPM

## 2024-06-27 DIAGNOSIS — I70.213 ATHEROSCLER OF NATIVE ARTERY OF BOTH LEGS WITH INTERMIT CLAUDICATION (HCC): Primary | ICD-10-CM

## 2024-06-27 PROCEDURE — 99024 POSTOP FOLLOW-UP VISIT: CPT | Performed by: NURSE PRACTITIONER

## 2024-06-27 RX ORDER — ERGOCALCIFEROL 1.25 MG/1
CAPSULE ORAL
COMMUNITY
Start: 2024-06-26

## 2024-06-27 NOTE — PROGRESS NOTES
Tyler Franks is a 58 y.o. male who presents for post op evaluation.  Patient had a  ax bi fem 3 weeks ago.  This was performed by Dr. Tapia.  Post op symptoms reported thrombosis requiring thrombectomy.   Post op pain is minimal.      On evaluation today, incision is clean, dry, intact.  No signs of infection are present.  Today we removed all staples and sutures.  femoral pulses are present 2+ .    Today we have recommended he Wash incision daily with soap and water and cover with dry gauze until healed.  We have recommended continued ASA 81 mg po qd, clopidogrel 75 mg po qd, ekuqyus  daily.  We will follow up with him in 3 months.  This should bring you up to date on Tyler Franks  As always we want to thank you for allowing us to participate in the care of your patients.    Sincerely,    KAMAR Elder

## 2024-11-13 ENCOUNTER — HOSPITAL ENCOUNTER (OUTPATIENT)
Dept: VASCULAR LAB | Age: 59
Discharge: HOME | End: 2024-11-15
Payer: MEDICAID

## 2024-11-13 DIAGNOSIS — I70.213 ATHEROSCLER OF NATIVE ARTERY OF BOTH LEGS WITH INTERMIT CLAUDICATION (HCC): ICD-10-CM

## 2024-11-13 PROCEDURE — 93923 UPR/LXTR ART STDY 3+ LVLS: CPT

## 2024-11-14 ENCOUNTER — OFFICE VISIT (OUTPATIENT)
Dept: VASCULAR SURGERY | Age: 59
End: 2024-11-14
Payer: MEDICAID

## 2024-11-14 VITALS
SYSTOLIC BLOOD PRESSURE: 150 MMHG | TEMPERATURE: 98 F | HEART RATE: 105 BPM | OXYGEN SATURATION: 100 % | DIASTOLIC BLOOD PRESSURE: 80 MMHG

## 2024-11-14 DIAGNOSIS — I70.213 ATHEROSCLER OF NATIVE ARTERY OF BOTH LEGS WITH INTERMIT CLAUDICATION (HCC): Primary | ICD-10-CM

## 2024-11-14 LAB
VAS LEFT ABI: 0.75
VAS LEFT ANKLE BP: 121 MMHG
VAS LEFT ARM BP: 161 MMHG
VAS LEFT CALF PRESSURE: 105 MMHG
VAS LEFT DORSALIS PEDIS BP: 108 MMHG
VAS LEFT HIGH THIGH PRESSURE: 139 MMHG
VAS LEFT LOW THIGH PRESSURE: 102 MMHG
VAS LEFT PTA BP: 121 MMHG
VAS LEFT TBI: 0.64
VAS LEFT TOE PRESSURE: 103 MMHG
VAS RIGHT ABI: 0.75
VAS RIGHT ANKLE BP: 120 MMHG
VAS RIGHT ARM BP: 161 MMHG
VAS RIGHT CALF PRESSURE: 112 MMHG
VAS RIGHT DORSALIS PEDIS BP: 116 MMHG
VAS RIGHT HIGH THIGH PRESSURE: 132 MMHG
VAS RIGHT LOW THIGH PRESSURE: 106 MMHG
VAS RIGHT PTA BP: 120 MMHG
VAS RIGHT TBI: 0.48
VAS RIGHT TOE PRESSURE: 78 MMHG

## 2024-11-14 PROCEDURE — 99214 OFFICE O/P EST MOD 30 MIN: CPT | Performed by: NURSE PRACTITIONER

## 2024-11-14 NOTE — PROGRESS NOTES
Tyler Franks (:  1965) is a 58 y.o. male,Established patient, here for evaluation of the following chief complaint(s):  Follow-up            SUBJECTIVE/OBJECTIVE:  Tyler has a history of peripheral vascular disease of the lower extremities.  He has had this for 1 - 5 years. Current treatment includes plavix, eliquis, and lipitor.  Tyler has not had new wounds. Recently, he reports claudication at a distance of  which is extended.  Tyler reports that the right leg is equal to the left.  He reports claudication is not changed and is mostly in the form of crampy type pain starting in the calves. He has a short recovery time. This is reproducible in nature. He reports ischemic rest pain 0 times per night.  He reports walking with cart does not help.    I have personally reviewed the following: problem list, current meds, allergies, PMH, PSH, family hx, and social hx  Tyler Franks is a 58 y.o. male with the following history as recorded in [facility]:  Patient Active Problem List    Diagnosis Date Noted    Abnormal stress test 2024    PAD (peripheral artery disease) ([facility]) 06/10/2024    Peripheral vascular disease, unspecified ([facility]) 2024    PVD (peripheral vascular disease) ([facility]) 03/15/2024     Current Outpatient Medications   Medication Sig Dispense Refill    vitamin D (ERGOCALCIFEROL) 1.25 MG (50554 UT) CAPS capsule TAKE 1 CAPSULE BY MOUTH WEEKLY      clopidogrel (PLAVIX) 75 MG tablet       apixaban (ELIQUIS) 5 MG TABS tablet Take 1 tablet by mouth 2 times daily 60 tablet 5    finasteride (PROSCAR) 5 MG tablet Take 1 tablet by mouth daily      HYDROcodone-acetaminophen (NORCO)  MG per tablet Take 1 tablet by mouth every 6 hours as needed for Pain.      aspirin (SB LOW DOSE ASA EC) 81 MG EC tablet Take 1 tablet by mouth once for 1 dose 30 tablet 0    atorvastatin (LIPITOR) 40 MG tablet Take 1 tablet by mouth daily      losartan (COZAAR) 25 MG tablet Take 1 tablet by mouth

## 2024-11-25 NOTE — PLAN OF CARE
Problem: Discharge Planning  Goal: Discharge to home or other facility with appropriate resources  Outcome: Progressing     Problem: Pain  Goal: Verbalizes/displays adequate comfort level or baseline comfort level  6/15/2024 0100 by Sola Reyes RN  Outcome: Progressing  6/14/2024 1151 by Alisson Gonzalez LPN  Outcome: Progressing     Problem: ABCDS Injury Assessment  Goal: Absence of physical injury  Outcome: Progressing  Flowsheets (Taken 6/14/2024 1150 by Alisson Gonzalez LPN)  Absence of Physical Injury: Implement safety measures based on patient assessment     Problem: Risk for Elopement  Goal: Patient will not exit the unit/facility without proper excort  Outcome: Progressing     Problem: Safety - Adult  Goal: Free from fall injury  6/15/2024 0100 by Sola Reyes RN  Outcome: Progressing  6/14/2024 1151 by Alisson Gonzalez LPN  Outcome: Progressing      Pt is a 48yr old male with no reported PMH initially presented to [facility] 11/17 for CP. Found to have troponemia and was admitted for ACS. Cath on 11/18 revealed 3V CAD for which pt was tx to [facility] 11/18 for surgical evaluation. Pt is undergoing preoperative planning. Pt underwent CABG x 4 (LIMA-LAD, Radial-OM, SVG-OM, LADI-RCA, EF 45-50%; CBP 198min, XC 108min) with Dr. Dyson 11/22/24. Procedure c/b hematoma in RUE 2/2 R radial arterial line requiring intraop vascular consult and fasciotomy.     Recommendations:  -Continue wound VAC, bedside wound vac change today  -Will plan to close fasciotomy site on Wednesday 11/27  -Vascular surgery will continue to follow      Case d/w attending and chief resident on call.

## 2025-01-21 ENCOUNTER — OFFICE VISIT (OUTPATIENT)
Dept: PRIMARY CARE CLINIC | Age: 60
End: 2025-01-21
Payer: MEDICAID

## 2025-01-21 VITALS
RESPIRATION RATE: 20 BRPM | BODY MASS INDEX: 23.43 KG/M2 | OXYGEN SATURATION: 97 % | WEIGHT: 140.6 LBS | HEART RATE: 96 BPM | DIASTOLIC BLOOD PRESSURE: 94 MMHG | SYSTOLIC BLOOD PRESSURE: 182 MMHG | HEIGHT: 65 IN

## 2025-01-21 DIAGNOSIS — I73.9 PERIPHERAL VASCULAR DISEASE, UNSPECIFIED (HCC): ICD-10-CM

## 2025-01-21 DIAGNOSIS — I10 PRIMARY HYPERTENSION: Primary | ICD-10-CM

## 2025-01-21 DIAGNOSIS — R73.9 HYPERGLYCEMIA: ICD-10-CM

## 2025-01-21 DIAGNOSIS — E55.9 VITAMIN D DEFICIENCY: ICD-10-CM

## 2025-01-21 DIAGNOSIS — N40.0 ENLARGED PROSTATE: ICD-10-CM

## 2025-01-21 DIAGNOSIS — K42.9 UMBILICAL HERNIA WITHOUT OBSTRUCTION AND WITHOUT GANGRENE: ICD-10-CM

## 2025-01-21 DIAGNOSIS — N18.32 CKD STAGE 3B, GFR 30-44 ML/MIN (HCC): ICD-10-CM

## 2025-01-21 DIAGNOSIS — E78.2 MIXED HYPERLIPIDEMIA: ICD-10-CM

## 2025-01-21 PROCEDURE — 1036F TOBACCO NON-USER: CPT | Performed by: FAMILY MEDICINE

## 2025-01-21 PROCEDURE — G8427 DOCREV CUR MEDS BY ELIG CLIN: HCPCS | Performed by: FAMILY MEDICINE

## 2025-01-21 PROCEDURE — 3080F DIAST BP >= 90 MM HG: CPT | Performed by: FAMILY MEDICINE

## 2025-01-21 PROCEDURE — 99204 OFFICE O/P NEW MOD 45 MIN: CPT | Performed by: FAMILY MEDICINE

## 2025-01-21 PROCEDURE — 3017F COLORECTAL CA SCREEN DOC REV: CPT | Performed by: FAMILY MEDICINE

## 2025-01-21 PROCEDURE — G8420 CALC BMI NORM PARAMETERS: HCPCS | Performed by: FAMILY MEDICINE

## 2025-01-21 PROCEDURE — 3077F SYST BP >= 140 MM HG: CPT | Performed by: FAMILY MEDICINE

## 2025-01-21 RX ORDER — CHOLECALCIFEROL (VITAMIN D3) 1250 MCG
50000 CAPSULE ORAL DAILY
Qty: 12 CAPSULE | Refills: 3 | Status: SHIPPED | OUTPATIENT
Start: 2025-01-21 | End: 2025-01-27 | Stop reason: CLARIF

## 2025-01-21 RX ORDER — FINASTERIDE 5 MG/1
5 TABLET, FILM COATED ORAL DAILY
Qty: 90 TABLET | Refills: 3 | Status: SHIPPED | OUTPATIENT
Start: 2025-01-21

## 2025-01-21 RX ORDER — ATORVASTATIN CALCIUM 40 MG/1
40 TABLET, FILM COATED ORAL DAILY
Qty: 90 TABLET | Refills: 3 | Status: SHIPPED | OUTPATIENT
Start: 2025-01-21

## 2025-01-21 SDOH — ECONOMIC STABILITY: FOOD INSECURITY: WITHIN THE PAST 12 MONTHS, YOU WORRIED THAT YOUR FOOD WOULD RUN OUT BEFORE YOU GOT MONEY TO BUY MORE.: NEVER TRUE

## 2025-01-21 SDOH — ECONOMIC STABILITY: FOOD INSECURITY: WITHIN THE PAST 12 MONTHS, THE FOOD YOU BOUGHT JUST DIDN'T LAST AND YOU DIDN'T HAVE MONEY TO GET MORE.: NEVER TRUE

## 2025-01-21 ASSESSMENT — PATIENT HEALTH QUESTIONNAIRE - PHQ9
2. FEELING DOWN, DEPRESSED OR HOPELESS: NOT AT ALL
SUM OF ALL RESPONSES TO PHQ QUESTIONS 1-9: 0
SUM OF ALL RESPONSES TO PHQ9 QUESTIONS 1 & 2: 0
SUM OF ALL RESPONSES TO PHQ QUESTIONS 1-9: 0
SUM OF ALL RESPONSES TO PHQ QUESTIONS 1-9: 0
1. LITTLE INTEREST OR PLEASURE IN DOING THINGS: NOT AT ALL
SUM OF ALL RESPONSES TO PHQ QUESTIONS 1-9: 0

## 2025-01-21 NOTE — PROGRESS NOTES
Reason For Visit:   Tyler Franks is a 59 y.o. male who presents to the office to establish with myself today.      Combined HPI and A/P:      Diagnosis Orders   1. Primary hypertension        2. CKD stage 3b, GFR 30-44 ml/min ([facility])        3. Vitamin D deficiency  DISCONTINUED: Cholecalciferol (VITAMIN D3) 1.25 MG (14933 UT) CAPS      4. Enlarged prostate  finasteride (PROSCAR) 5 MG tablet      5. Peripheral vascular disease, unspecified (HCC)        6. Mixed hyperlipidemia  atorvastatin (LIPITOR) 40 MG tablet    TSH    T4, Free      7. Hyperglycemia  Hemoglobin A1C      8. Umbilical hernia without obstruction and without gangrene            1.) Hypertension:        - This is a chronic, uncontrolled problem. He does check his BP at home.The patient does follow a low salt diet. The patient denies headaches, blurry vision, dizziness, heart palpitations, or chest pain.        - The patient's BP is above goal.        - The patient currently takes Losartan 25 mg 1 tab daily. He did not take the Losartan this morning. He follows with nephrology.       2.) CKD stage IIIb: This is a chronic, stable problem. He follows with [facility] with nephrology. He has a follow up with nephrology on  Jan 28th. .    3.) Vit D Def: This is a chronic, stable problem. This is likely due to his CKD. He currently takes Vit D supplementation.     4.) BPH: This is a chronic, stable problem. He currently taking Proscar 5 mg 1 tab daily. He follows with urology. He has a cystoscope scheduled for tomorrow.        - CT Abd/Pel on 05/15/2024 : enlargement of the prostate gland     5.) Hx of Axillary Bifemoral Bypass: He has had a Bifemoral bypass. He follows with Vascular surgery. He currently taking ASA, Plavix, Eliquis, and Lipitor.     6.) Hyperlipidemia: This is a chronic, stable problem. He current takes Lipitor 40 mg 1 tablet orally nightly     7.) Pancreas divisum and pancreatic cyst: He has pancreas divisum and several

## 2025-01-27 DIAGNOSIS — E55.9 VITAMIN D DEFICIENCY: Primary | ICD-10-CM

## 2025-01-27 RX ORDER — ERGOCALCIFEROL 1.25 MG/1
50000 CAPSULE, LIQUID FILLED ORAL WEEKLY
Qty: 12 CAPSULE | Refills: 1 | Status: SHIPPED | OUTPATIENT
Start: 2025-01-27

## 2025-01-28 PROBLEM — N18.32 CKD STAGE 3B, GFR 30-44 ML/MIN (HCC): Status: ACTIVE | Noted: 2025-01-28

## 2025-01-28 PROBLEM — K42.9 UMBILICAL HERNIA WITHOUT OBSTRUCTION AND WITHOUT GANGRENE: Status: ACTIVE | Noted: 2025-01-28

## 2025-01-28 PROBLEM — N40.0 ENLARGED PROSTATE: Status: ACTIVE | Noted: 2025-01-28

## 2025-01-28 PROBLEM — E55.9 VITAMIN D DEFICIENCY: Status: ACTIVE | Noted: 2025-01-28

## 2025-01-28 PROBLEM — I10 PRIMARY HYPERTENSION: Status: ACTIVE | Noted: 2025-01-28

## 2025-01-28 PROBLEM — E78.2 MIXED HYPERLIPIDEMIA: Status: ACTIVE | Noted: 2025-01-28

## 2025-01-28 RX ORDER — LOSARTAN POTASSIUM 50 MG/1
50 TABLET ORAL 2 TIMES DAILY
COMMUNITY
Start: 2024-11-25

## 2025-01-28 RX ORDER — TAMSULOSIN HYDROCHLORIDE 0.4 MG/1
0.4 CAPSULE ORAL NIGHTLY
COMMUNITY

## 2025-01-28 RX ORDER — ONDANSETRON 4 MG/1
4 TABLET, ORALLY DISINTEGRATING ORAL EVERY 8 HOURS PRN
COMMUNITY
Start: 2024-12-03

## 2025-01-28 ASSESSMENT — ENCOUNTER SYMPTOMS
VOMITING: 0
TROUBLE SWALLOWING: 0
SHORTNESS OF BREATH: 0
DIARRHEA: 0
COUGH: 0
NAUSEA: 0
ABDOMINAL PAIN: 0
CONSTIPATION: 0

## 2025-03-04 ENCOUNTER — OFFICE VISIT (OUTPATIENT)
Dept: PRIMARY CARE CLINIC | Age: 60
End: 2025-03-04
Payer: MEDICAID

## 2025-03-04 VITALS
WEIGHT: 129 LBS | OXYGEN SATURATION: 98 % | SYSTOLIC BLOOD PRESSURE: 128 MMHG | HEART RATE: 97 BPM | TEMPERATURE: 98 F | BODY MASS INDEX: 21.47 KG/M2 | DIASTOLIC BLOOD PRESSURE: 78 MMHG

## 2025-03-04 DIAGNOSIS — H81.13 BENIGN PAROXYSMAL POSITIONAL VERTIGO DUE TO BILATERAL VESTIBULAR DISORDER: Primary | ICD-10-CM

## 2025-03-04 DIAGNOSIS — I10 PRIMARY HYPERTENSION: ICD-10-CM

## 2025-03-04 PROCEDURE — 99214 OFFICE O/P EST MOD 30 MIN: CPT | Performed by: FAMILY MEDICINE

## 2025-03-04 PROCEDURE — 3017F COLORECTAL CA SCREEN DOC REV: CPT | Performed by: FAMILY MEDICINE

## 2025-03-04 PROCEDURE — 3078F DIAST BP <80 MM HG: CPT | Performed by: FAMILY MEDICINE

## 2025-03-04 PROCEDURE — 1036F TOBACCO NON-USER: CPT | Performed by: FAMILY MEDICINE

## 2025-03-04 PROCEDURE — G8420 CALC BMI NORM PARAMETERS: HCPCS | Performed by: FAMILY MEDICINE

## 2025-03-04 PROCEDURE — 3074F SYST BP LT 130 MM HG: CPT | Performed by: FAMILY MEDICINE

## 2025-03-04 PROCEDURE — G8427 DOCREV CUR MEDS BY ELIG CLIN: HCPCS | Performed by: FAMILY MEDICINE

## 2025-03-04 RX ORDER — MECLIZINE HYDROCHLORIDE 25 MG/1
25 TABLET ORAL PRN
Qty: 30 TABLET | Refills: 0 | Status: SHIPPED | OUTPATIENT
Start: 2025-03-04

## 2025-03-04 RX ORDER — MECLIZINE HYDROCHLORIDE 25 MG/1
25 TABLET ORAL
COMMUNITY
Start: 2025-02-26 | End: 2025-03-04 | Stop reason: SDUPTHER

## 2025-03-04 SDOH — ECONOMIC STABILITY: FOOD INSECURITY: WITHIN THE PAST 12 MONTHS, THE FOOD YOU BOUGHT JUST DIDN'T LAST AND YOU DIDN'T HAVE MONEY TO GET MORE.: NEVER TRUE

## 2025-03-04 SDOH — ECONOMIC STABILITY: FOOD INSECURITY: WITHIN THE PAST 12 MONTHS, YOU WORRIED THAT YOUR FOOD WOULD RUN OUT BEFORE YOU GOT MONEY TO BUY MORE.: NEVER TRUE

## 2025-03-04 ASSESSMENT — PATIENT HEALTH QUESTIONNAIRE - PHQ9
SUM OF ALL RESPONSES TO PHQ QUESTIONS 1-9: 0
1. LITTLE INTEREST OR PLEASURE IN DOING THINGS: NOT AT ALL
SUM OF ALL RESPONSES TO PHQ QUESTIONS 1-9: 0
2. FEELING DOWN, DEPRESSED OR HOPELESS: NOT AT ALL

## 2025-03-04 NOTE — PROGRESS NOTES
Reason For Visit:   Vertigo    Combined HPI and A/P:      Diagnosis Orders   1. Benign paroxysmal positional vertigo due to bilateral vestibular disorder  meclizine (ANTIVERT) 25 MG tablet      2. Primary hypertension            1.)  BPPV:  -This is a new, stable problem.  He began having intermittent dizziness and went to the emergency department on 2/26/2025.  He was seen at [facility].  He had a CT of his head which is within normal limits.  He was diagnosed with vertigo and prescribed Antivert.  The patient has continued to have symptoms.  This occurs intermittently with movement of his head, and mostly to the right.  He will feel like the world is spinning around him.  This will often cause nausea.  I discussed with the patient the cause of BPPV.  He was given a handout on the Epley maneuvers.  He will perform these at his home 3-4 times per day.  He will call if he has no improvement.  I will refill his Antivert, as this does seem to give him some relief from the dizziness.    2.) Hypertension:        - This is a chronic, uncontrolled problem. He does check his BP at home.The patient does follow a low salt diet. The patient denies headaches, blurry vision, dizziness, heart palpitations, or chest pain.        - The patient's BP is at goal.        - The patient currently takes Losartan 25 mg 1 tab daily.  I will continue losartan at the current dose.  He follows with nephrology.       Return for previously scheduled appointment.    We discussed use, benefit, and side effects of prescribed medications.  All patient questions answered.   Patient agreed with treatment plan.   I reviewed available records in our system and care everywhere. In cases where records are not available, the records have been requested and will be reviewed when available.     Subjective      Past Surgical History:   Procedure Laterality Date    AXILLARY-FEMORAL BYPASS GRAFT Bilateral 06/10/2024    AXILLARY

## 2025-03-10 ASSESSMENT — ENCOUNTER SYMPTOMS
NAUSEA: 0
VOMITING: 0
DIARRHEA: 0
CONSTIPATION: 0
COUGH: 0
ABDOMINAL PAIN: 0
SHORTNESS OF BREATH: 0
TROUBLE SWALLOWING: 0

## 2025-03-25 ENCOUNTER — HOSPITAL ENCOUNTER (OUTPATIENT)
Dept: VASCULAR LAB | Age: 60
Discharge: HOME | End: 2025-03-27
Payer: MEDICAID

## 2025-03-25 ENCOUNTER — OFFICE VISIT (OUTPATIENT)
Dept: VASCULAR SURGERY | Age: 60
End: 2025-03-25
Payer: MEDICAID

## 2025-03-25 VITALS
BODY MASS INDEX: 21.47 KG/M2 | HEART RATE: 84 BPM | HEIGHT: 65 IN | TEMPERATURE: 97.9 F | SYSTOLIC BLOOD PRESSURE: 146 MMHG | DIASTOLIC BLOOD PRESSURE: 82 MMHG | OXYGEN SATURATION: 100 %

## 2025-03-25 DIAGNOSIS — I70.213 ATHEROSCLER OF NATIVE ARTERY OF BOTH LEGS WITH INTERMIT CLAUDICATION: ICD-10-CM

## 2025-03-25 DIAGNOSIS — I70.213 ATHEROSCLER OF NATIVE ARTERY OF BOTH LEGS WITH INTERMIT CLAUDICATION: Primary | ICD-10-CM

## 2025-03-25 LAB
VAS LEFT ABI: 0.63
VAS LEFT ARM BP: 168 MMHG
VAS LEFT CALF PRESSURE: 105 MMHG
VAS LEFT DORSALIS PEDIS BP: 102 MMHG
VAS LEFT HIGH THIGH PRESSURE: 143 MMHG
VAS LEFT LOW THIGH PRESSURE: 124 MMHG
VAS LEFT PTA BP: 106 MMHG
VAS LEFT TBI: 0.49
VAS LEFT TOE PRESSURE: 83 MMHG
VAS RIGHT ABI: 0.64
VAS RIGHT ARM BP: 162 MMHG
VAS RIGHT CALF PRESSURE: 118 MMHG
VAS RIGHT DORSALIS PEDIS BP: 108 MMHG
VAS RIGHT HIGH THIGH PRESSURE: 138 MMHG
VAS RIGHT LOW THIGH PRESSURE: 137 MMHG
VAS RIGHT PTA BP: 101 MMHG
VAS RIGHT TBI: 0.7
VAS RIGHT TOE PRESSURE: 117 MMHG

## 2025-03-25 PROCEDURE — 93923 UPR/LXTR ART STDY 3+ LVLS: CPT | Performed by: SURGERY

## 2025-03-25 PROCEDURE — 93923 UPR/LXTR ART STDY 3+ LVLS: CPT

## 2025-03-25 PROCEDURE — 3077F SYST BP >= 140 MM HG: CPT | Performed by: NURSE PRACTITIONER

## 2025-03-25 PROCEDURE — 3079F DIAST BP 80-89 MM HG: CPT | Performed by: NURSE PRACTITIONER

## 2025-03-25 PROCEDURE — 99214 OFFICE O/P EST MOD 30 MIN: CPT | Performed by: NURSE PRACTITIONER

## 2025-03-25 PROCEDURE — G8420 CALC BMI NORM PARAMETERS: HCPCS | Performed by: NURSE PRACTITIONER

## 2025-03-25 PROCEDURE — 1036F TOBACCO NON-USER: CPT | Performed by: NURSE PRACTITIONER

## 2025-03-25 PROCEDURE — 3017F COLORECTAL CA SCREEN DOC REV: CPT | Performed by: NURSE PRACTITIONER

## 2025-03-25 PROCEDURE — G8427 DOCREV CUR MEDS BY ELIG CLIN: HCPCS | Performed by: NURSE PRACTITIONER

## 2025-03-25 NOTE — PROGRESS NOTES
Tyler Franks (:  1965) is a 59 y.o. male,Established patient, here for evaluation of the following chief complaint(s):  3 Month Follow-Up ([place])            SUBJECTIVE/OBJECTIVE:  Tyler has a history of peripheral vascular disease of the lower extremities.  He has had this for 1 - 5 years. Current treatment includes asa, plavix, and lipitor.  Tyler has not had new wounds. Recently, he reports claudication at a distance of  which varies.  Tyler reports that the right leg is equal to the left.  He reports claudication is not changed and is mostly in the form of crampy type pain starting in the calves. He has a short recovery time. This is reproducible in nature. He reports ischemic rest pain 0 times per night.  He reports walking with cart does not help.    I have personally reviewed the following: problem list, current meds, allergies, PMH, PSH, family hx, and social hx  Tyler Franks is a 59 y.o. male with the following history as recorded in [facility]:  Patient Active Problem List    Diagnosis Date Noted    Abnormal stress test 2024    Primary hypertension 2025    CKD stage 3b, GFR 30-44 ml/min ([facility]) 2025    Vitamin D deficiency 2025    Enlarged prostate 2025    Mixed hyperlipidemia 2025    Umbilical hernia without obstruction and without gangrene 2025    PAD (peripheral artery disease) 06/10/2024    Peripheral vascular disease, unspecified 2024    PVD (peripheral vascular disease) 03/15/2024     Current Outpatient Medications   Medication Sig Dispense Refill    meclizine (ANTIVERT) 25 MG tablet Take 1 tablet by mouth as needed for Dizziness 30 tablet 0    ondansetron (ZOFRAN-ODT) 4 MG disintegrating tablet Take 1 tablet by mouth every 8 hours as needed for Nausea      tamsulosin (FLOMAX) 0.4 MG capsule Take 1 capsule by mouth nightly      losartan (COZAAR) 50 MG tablet Take 1 tablet by mouth 2 times daily      atorvastatin (LIPITOR) 40 MG

## 2025-05-27 ENCOUNTER — OFFICE VISIT (OUTPATIENT)
Dept: CARDIOLOGY CLINIC | Age: 60
End: 2025-05-27
Payer: MEDICAID

## 2025-05-27 VITALS
HEART RATE: 80 BPM | BODY MASS INDEX: 22.99 KG/M2 | SYSTOLIC BLOOD PRESSURE: 130 MMHG | DIASTOLIC BLOOD PRESSURE: 88 MMHG | HEIGHT: 65 IN | WEIGHT: 138 LBS | OXYGEN SATURATION: 100 %

## 2025-05-27 DIAGNOSIS — I10 ESSENTIAL HYPERTENSION: ICD-10-CM

## 2025-05-27 DIAGNOSIS — E78.5 DYSLIPIDEMIA: ICD-10-CM

## 2025-05-27 DIAGNOSIS — I25.10 CORONARY ARTERY DISEASE INVOLVING NATIVE CORONARY ARTERY OF NATIVE HEART WITHOUT ANGINA PECTORIS: Primary | ICD-10-CM

## 2025-05-27 PROCEDURE — 3079F DIAST BP 80-89 MM HG: CPT | Performed by: NURSE PRACTITIONER

## 2025-05-27 PROCEDURE — 3017F COLORECTAL CA SCREEN DOC REV: CPT | Performed by: NURSE PRACTITIONER

## 2025-05-27 PROCEDURE — 3075F SYST BP GE 130 - 139MM HG: CPT | Performed by: NURSE PRACTITIONER

## 2025-05-27 PROCEDURE — 99214 OFFICE O/P EST MOD 30 MIN: CPT | Performed by: NURSE PRACTITIONER

## 2025-05-27 PROCEDURE — G8427 DOCREV CUR MEDS BY ELIG CLIN: HCPCS | Performed by: NURSE PRACTITIONER

## 2025-05-27 PROCEDURE — 1036F TOBACCO NON-USER: CPT | Performed by: NURSE PRACTITIONER

## 2025-05-27 PROCEDURE — G8420 CALC BMI NORM PARAMETERS: HCPCS | Performed by: NURSE PRACTITIONER

## 2025-05-27 ASSESSMENT — ENCOUNTER SYMPTOMS
SHORTNESS OF BREATH: 0
COUGH: 0
SORE THROAT: 0
WHEEZING: 0
CHEST TIGHTNESS: 0

## 2025-05-27 NOTE — PROGRESS NOTES
[facility] Cardiology   Established Patient Office Visit  [address]  [address]  [facility] [id number] [phone]        OFFICE VISIT:  2025    Tyler Franks - : 1965    Reason For Visit:  Tyler is a 59 y.o. male who is here for Follow-up and Hypertension    1. Coronary artery disease involving native coronary artery of native heart without angina pectoris    2. Essential hypertension    3. Dyslipidemia        Patient with a family history of early CAD, hypertension, dyslipidemia, chronic kidney disease stage III.  CAD  2020 for cardiac catheterization showing mild nonobstructive CAD.  Peripheral vascular and peripheral arterial disease followed by vascular surgery.    Patient presents to clinic today for routine follow-up.  Patient denies any chest pain, pressure or tightness.  There is no shortness of breath, orthopnea or PND.  Patient denies any lightheadedness, dizziness or syncope.    Patient states he is currently holding his Plavix he is due to have a prostate procedure by urologist in [place].      Subjective    Tyler Franks is a 59 y.o. male with the following history as recorded in [facility]:    Patient Active Problem List    Diagnosis Date Noted    Abnormal stress test 2024    Primary hypertension 2025    CKD stage 3b, GFR 30-44 ml/min ([facility]) 2025    Vitamin D deficiency 2025    Enlarged prostate 2025    Mixed hyperlipidemia 2025    Umbilical hernia without obstruction and without gangrene 2025    PAD (peripheral artery disease) 06/10/2024    Peripheral vascular disease, unspecified 2024    PVD (peripheral vascular disease) 03/15/2024     Current Outpatient Medications   Medication Sig Dispense Refill    ondansetron (ZOFRAN-ODT) 4 MG disintegrating tablet Take 1 tablet by mouth every 8 hours as needed for Nausea      tamsulosin (FLOMAX) 0.4 MG capsule Take 1 capsule by mouth nightly      losartan (COZAAR) 50 MG tablet Take 1

## 2025-05-28 DIAGNOSIS — E78.2 MIXED HYPERLIPIDEMIA: ICD-10-CM

## 2025-05-29 RX ORDER — ATORVASTATIN CALCIUM 40 MG/1
40 TABLET, FILM COATED ORAL DAILY
Qty: 90 TABLET | Refills: 3 | Status: SHIPPED | OUTPATIENT
Start: 2025-05-29

## 2025-05-29 RX ORDER — LOSARTAN POTASSIUM 50 MG/1
50 TABLET ORAL 2 TIMES DAILY
Qty: 90 TABLET | Refills: 3 | Status: SHIPPED | OUTPATIENT
Start: 2025-05-29

## 2025-06-09 ENCOUNTER — TELEPHONE (OUTPATIENT)
Dept: INTERNAL MEDICINE | Age: 60
End: 2025-06-09

## 2025-06-12 ENCOUNTER — TELEPHONE (OUTPATIENT)
Dept: PRIMARY CARE CLINIC | Age: 60
End: 2025-06-12

## 2025-06-12 ENCOUNTER — TELEPHONE (OUTPATIENT)
Dept: INTERNAL MEDICINE | Age: 60
End: 2025-06-12

## 2025-07-01 ENCOUNTER — OFFICE VISIT (OUTPATIENT)
Dept: VASCULAR SURGERY | Age: 60
End: 2025-07-01
Payer: MEDICAID

## 2025-07-01 ENCOUNTER — HOSPITAL ENCOUNTER (OUTPATIENT)
Dept: VASCULAR LAB | Age: 60
Discharge: HOME | End: 2025-07-03
Payer: MEDICAID

## 2025-07-01 VITALS
HEART RATE: 76 BPM | TEMPERATURE: 97.3 F | DIASTOLIC BLOOD PRESSURE: 72 MMHG | OXYGEN SATURATION: 100 % | SYSTOLIC BLOOD PRESSURE: 128 MMHG

## 2025-07-01 DIAGNOSIS — I70.213 ATHEROSCLER OF NATIVE ARTERY OF BOTH LEGS WITH INTERMIT CLAUDICATION: Primary | ICD-10-CM

## 2025-07-01 DIAGNOSIS — I70.213 ATHEROSCLER OF NATIVE ARTERY OF BOTH LEGS WITH INTERMIT CLAUDICATION: ICD-10-CM

## 2025-07-01 PROCEDURE — 3017F COLORECTAL CA SCREEN DOC REV: CPT | Performed by: NURSE PRACTITIONER

## 2025-07-01 PROCEDURE — 1036F TOBACCO NON-USER: CPT | Performed by: NURSE PRACTITIONER

## 2025-07-01 PROCEDURE — G8427 DOCREV CUR MEDS BY ELIG CLIN: HCPCS | Performed by: NURSE PRACTITIONER

## 2025-07-01 PROCEDURE — 3078F DIAST BP <80 MM HG: CPT | Performed by: NURSE PRACTITIONER

## 2025-07-01 PROCEDURE — 93923 UPR/LXTR ART STDY 3+ LVLS: CPT

## 2025-07-01 PROCEDURE — G8420 CALC BMI NORM PARAMETERS: HCPCS | Performed by: NURSE PRACTITIONER

## 2025-07-01 PROCEDURE — 99214 OFFICE O/P EST MOD 30 MIN: CPT | Performed by: NURSE PRACTITIONER

## 2025-07-01 PROCEDURE — 3074F SYST BP LT 130 MM HG: CPT | Performed by: NURSE PRACTITIONER

## 2025-07-01 NOTE — PROGRESS NOTES
Tyler Franks (:  1965) is a 59 y.o. male,Established patient, here for evaluation of the following chief complaint(s):  3 Month Follow-Up and Results ([place])            SUBJECTIVE/OBJECTIVE:  Tyler has a history of peripheral vascular disease of the lower extremities.  He has had this for 1 - 5 years. Current treatment includes asa and lipitor.  Tyler has not had new wounds. Recently, he reports claudication at a distance of  which varies.  Tyler reports that the right leg is equal to the left.  He reports claudication is not changed and is mostly in the form of crampy type pain starting in the calves. He has a short recovery time. This is reproducible in nature. He reports ischemic rest pain 0 times per night.  He reports walking with cart does not help.    I have personally reviewed the following: problem list, current meds, allergies, PMH, PSH, family hx, and social hx  Tyler Franks is a 59 y.o. male with the following history as recorded in [facility]:  Patient Active Problem List    Diagnosis Date Noted    Abnormal stress test 2024    Primary hypertension 2025    CKD stage 3b, GFR 30-44 ml/min ([facility]) 2025    Vitamin D deficiency 2025    Enlarged prostate 2025    Mixed hyperlipidemia 2025    Umbilical hernia without obstruction and without gangrene 2025    PAD (peripheral artery disease) 06/10/2024    Peripheral vascular disease, unspecified 2024    PVD (peripheral vascular disease) 03/15/2024     Current Outpatient Medications   Medication Sig Dispense Refill    atorvastatin (LIPITOR) 40 MG tablet Take 1 tablet by mouth daily 90 tablet 3    losartan (COZAAR) 50 MG tablet Take 1 tablet by mouth 2 times daily 90 tablet 3    ondansetron (ZOFRAN-ODT) 4 MG disintegrating tablet Take 1 tablet by mouth every 8 hours as needed for Nausea      tamsulosin (FLOMAX) 0.4 MG capsule Take 1 capsule by mouth nightly      finasteride (PROSCAR) 5 MG tablet

## 2025-07-03 LAB
VAS LEFT ABI: 0.57
VAS LEFT ARM BP: 181 MMHG
VAS LEFT CALF PRESSURE: 111 MMHG
VAS LEFT DORSALIS PEDIS BP: 101 MMHG
VAS LEFT HIGH THIGH PRESSURE: 164 MMHG
VAS LEFT PTA BP: 103 MMHG
VAS LEFT TBI: 0.49
VAS LEFT TOE PRESSURE: 89 MMHG
VAS RIGHT ABI: 0.62
VAS RIGHT ARM BP: 173 MMHG
VAS RIGHT DORSALIS PEDIS BP: 111 MMHG
VAS RIGHT HIGH THIGH PRESSURE: 155 MMHG
VAS RIGHT LOW THIGH PRESSURE: 119 MMHG
VAS RIGHT PTA BP: 112 MMHG
VAS RIGHT TOE PRESSURE: 102 MMHG

## 2025-07-03 PROCEDURE — 93923 UPR/LXTR ART STDY 3+ LVLS: CPT | Performed by: SURGERY

## 2025-07-19 ENCOUNTER — HOSPITAL ENCOUNTER (INPATIENT)
Age: 60
LOS: 9 days | Discharge: HOME | DRG: 674 | End: 2025-07-28
Attending: HOSPITALIST | Admitting: HOSPITALIST
Payer: MEDICAID

## 2025-07-19 ENCOUNTER — APPOINTMENT (OUTPATIENT)
Dept: ULTRASOUND IMAGING | Age: 60
DRG: 674 | End: 2025-07-19
Attending: HOSPITALIST
Payer: MEDICAID

## 2025-07-19 DIAGNOSIS — N18.4 ACUTE KIDNEY INJURY SUPERIMPOSED ON STAGE 4 CHRONIC KIDNEY DISEASE (HCC): Primary | ICD-10-CM

## 2025-07-19 DIAGNOSIS — N17.9 ACUTE KIDNEY INJURY SUPERIMPOSED ON STAGE 4 CHRONIC KIDNEY DISEASE (HCC): Primary | ICD-10-CM

## 2025-07-19 PROBLEM — E87.5 HYPERKALEMIA: Status: ACTIVE | Noted: 2025-07-19

## 2025-07-19 LAB
25(OH)D3 SERPL-MCNC: 22 NG/ML
ALBUMIN SERPL-MCNC: 3.1 G/DL (ref 3.5–5.2)
ALP SERPL-CCNC: 94 U/L (ref 40–129)
ALT SERPL-CCNC: 27 U/L (ref 10–50)
ANION GAP SERPL CALCULATED.3IONS-SCNC: 12 MMOL/L (ref 8–16)
AST SERPL-CCNC: 22 U/L (ref 10–50)
BACTERIA URNS QL MICRO: NEGATIVE /HPF
BASOPHILS # BLD: 0.1 K/UL (ref 0–0.2)
BASOPHILS NFR BLD: 0.7 % (ref 0–1)
BILIRUB SERPL-MCNC: <0.2 MG/DL (ref 0.2–1.2)
BILIRUB UR QL STRIP: NEGATIVE
BNP BLD-MCNC: 469 PG/ML (ref 0–124)
BUN SERPL-MCNC: 97 MG/DL (ref 8–23)
CALCIUM SERPL-MCNC: 8.9 MG/DL (ref 8.8–10.2)
CHLORIDE SERPL-SCNC: 113 MMOL/L (ref 98–107)
CK SERPL-CCNC: 217 U/L (ref 39–308)
CLARITY UR: CLEAR
CO2 SERPL-SCNC: 16 MMOL/L (ref 22–29)
COLOR UR: YELLOW
CREAT SERPL-MCNC: 7.6 MG/DL (ref 0.7–1.2)
CREAT UR-MCNC: 43.5 MG/DL (ref 39–259)
CRYSTALS URNS MICRO: ABNORMAL /HPF
EOSINOPHIL # BLD: 0.3 K/UL (ref 0–0.6)
EOSINOPHIL NFR BLD: 2.3 % (ref 0–5)
EOSINOPHIL URNS QL MICRO: NORMAL
EPI CELLS #/AREA URNS AUTO: 0 /HPF (ref 0–5)
ERYTHROCYTE [DISTWIDTH] IN BLOOD BY AUTOMATED COUNT: 13.2 % (ref 11.5–14.5)
FERRITIN SERPL-MCNC: 164 NG/ML (ref 30–400)
GLUCOSE SERPL-MCNC: 110 MG/DL (ref 70–99)
GLUCOSE UR STRIP.AUTO-MCNC: 100 MG/DL
HCT VFR BLD AUTO: 33 % (ref 42–52)
HGB BLD-MCNC: 10.8 G/DL (ref 14–18)
HGB UR STRIP.AUTO-MCNC: ABNORMAL MG/L
HYALINE CASTS #/AREA URNS AUTO: 1 /HPF (ref 0–8)
IMM GRANULOCYTES # BLD: 0.1 K/UL
IRON SATN MFR SERPL: 20 % (ref 20–50)
IRON SERPL-MCNC: 45 UG/DL (ref 59–158)
KETONES UR STRIP.AUTO-MCNC: NEGATIVE MG/DL
LEUKOCYTE ESTERASE UR QL STRIP.AUTO: NEGATIVE
LYMPHOCYTES # BLD: 3.2 K/UL (ref 1.1–4.5)
LYMPHOCYTES NFR BLD: 27.4 % (ref 20–40)
MAGNESIUM SERPL-MCNC: 1.6 MG/DL (ref 1.6–2.4)
MCH RBC QN AUTO: 30.7 PG (ref 27–31)
MCHC RBC AUTO-ENTMCNC: 32.7 G/DL (ref 33–37)
MCV RBC AUTO: 93.8 FL (ref 80–94)
MONOCYTES # BLD: 1.1 K/UL (ref 0–0.9)
MONOCYTES NFR BLD: 9.7 % (ref 0–10)
NEUTROPHILS # BLD: 6.8 K/UL (ref 1.5–7.5)
NEUTS SEG NFR BLD: 59.5 % (ref 50–65)
NITRITE UR QL STRIP.AUTO: NEGATIVE
OSMOLALITY UR: 317 MOSM/KG (ref 250–1200)
PH UR STRIP.AUTO: 6.5 [PH] (ref 5–8)
PHOSPHATE SERPL-MCNC: 5.8 MG/DL (ref 2.5–4.5)
PLATELET # BLD AUTO: 244 K/UL (ref 130–400)
PMV BLD AUTO: 10.4 FL (ref 9.4–12.4)
POTASSIUM SERPL-SCNC: 5.1 MMOL/L (ref 3.5–5.1)
PROT SERPL-MCNC: 5.9 G/DL (ref 6.4–8.3)
PROT UR STRIP.AUTO-MCNC: 300 MG/DL
PTH-INTACT SERPL-MCNC: 294 PG/ML (ref 15–65)
RBC # BLD AUTO: 3.52 M/UL (ref 4.7–6.1)
RBC #/AREA URNS AUTO: 15 /HPF (ref 0–4)
SODIUM SERPL-SCNC: 141 MMOL/L (ref 136–145)
SODIUM UR-SCNC: 82 MMOL/L
SP GR UR STRIP.AUTO: 1.01 (ref 1–1.03)
TIBC SERPL-MCNC: 230 UG/DL (ref 250–400)
UROBILINOGEN UR STRIP.AUTO-MCNC: 0.2 E.U./DL
WBC # BLD AUTO: 11.5 K/UL (ref 4.8–10.8)
WBC #/AREA URNS AUTO: 1 /HPF (ref 0–5)

## 2025-07-19 PROCEDURE — 51798 US URINE CAPACITY MEASURE: CPT

## 2025-07-19 PROCEDURE — 83935 ASSAY OF URINE OSMOLALITY: CPT

## 2025-07-19 PROCEDURE — 1200000000 HC SEMI PRIVATE

## 2025-07-19 PROCEDURE — 2580000003 HC RX 258: Performed by: HOSPITALIST

## 2025-07-19 PROCEDURE — 51702 INSERT TEMP BLADDER CATH: CPT

## 2025-07-19 PROCEDURE — 83735 ASSAY OF MAGNESIUM: CPT

## 2025-07-19 PROCEDURE — 83880 ASSAY OF NATRIURETIC PEPTIDE: CPT

## 2025-07-19 PROCEDURE — 2500000003 HC RX 250 WO HCPCS: Performed by: HOSPITALIST

## 2025-07-19 PROCEDURE — 87205 SMEAR GRAM STAIN: CPT

## 2025-07-19 PROCEDURE — 82550 ASSAY OF CK (CPK): CPT

## 2025-07-19 PROCEDURE — 6370000000 HC RX 637 (ALT 250 FOR IP): Performed by: HOSPITALIST

## 2025-07-19 PROCEDURE — 93005 ELECTROCARDIOGRAM TRACING: CPT | Performed by: HOSPITALIST

## 2025-07-19 PROCEDURE — 81001 URINALYSIS AUTO W/SCOPE: CPT

## 2025-07-19 PROCEDURE — 80053 COMPREHEN METABOLIC PANEL: CPT

## 2025-07-19 PROCEDURE — 84100 ASSAY OF PHOSPHORUS: CPT

## 2025-07-19 PROCEDURE — 83540 ASSAY OF IRON: CPT

## 2025-07-19 PROCEDURE — 83970 ASSAY OF PARATHORMONE: CPT

## 2025-07-19 PROCEDURE — 6360000002 HC RX W HCPCS: Performed by: HOSPITALIST

## 2025-07-19 PROCEDURE — 85025 COMPLETE CBC W/AUTO DIFF WBC: CPT

## 2025-07-19 PROCEDURE — 82570 ASSAY OF URINE CREATININE: CPT

## 2025-07-19 PROCEDURE — 84300 ASSAY OF URINE SODIUM: CPT

## 2025-07-19 PROCEDURE — 36415 COLL VENOUS BLD VENIPUNCTURE: CPT

## 2025-07-19 PROCEDURE — 99223 1ST HOSP IP/OBS HIGH 75: CPT | Performed by: HOSPITALIST

## 2025-07-19 PROCEDURE — 76770 US EXAM ABDO BACK WALL COMP: CPT

## 2025-07-19 PROCEDURE — 82306 VITAMIN D 25 HYDROXY: CPT

## 2025-07-19 PROCEDURE — 83550 IRON BINDING TEST: CPT

## 2025-07-19 PROCEDURE — 94760 N-INVAS EAR/PLS OXIMETRY 1: CPT

## 2025-07-19 PROCEDURE — 82728 ASSAY OF FERRITIN: CPT

## 2025-07-19 RX ORDER — TAMSULOSIN HYDROCHLORIDE 0.4 MG/1
0.4 CAPSULE ORAL NIGHTLY
Status: DISCONTINUED | OUTPATIENT
Start: 2025-07-19 | End: 2025-07-28 | Stop reason: HOSPADM

## 2025-07-19 RX ORDER — ACETAMINOPHEN 650 MG/1
650 SUPPOSITORY RECTAL EVERY 4 HOURS PRN
Status: DISCONTINUED | OUTPATIENT
Start: 2025-07-19 | End: 2025-07-28 | Stop reason: HOSPADM

## 2025-07-19 RX ORDER — ATORVASTATIN CALCIUM 40 MG/1
40 TABLET, FILM COATED ORAL NIGHTLY
Status: DISCONTINUED | OUTPATIENT
Start: 2025-07-19 | End: 2025-07-28 | Stop reason: HOSPADM

## 2025-07-19 RX ORDER — NALOXONE HYDROCHLORIDE 0.4 MG/ML
0.4 INJECTION, SOLUTION INTRAMUSCULAR; INTRAVENOUS; SUBCUTANEOUS PRN
Status: DISCONTINUED | OUTPATIENT
Start: 2025-07-19 | End: 2025-07-28 | Stop reason: HOSPADM

## 2025-07-19 RX ORDER — MECOBALAMIN 5000 MCG
5 TABLET,DISINTEGRATING ORAL NIGHTLY PRN
Status: DISCONTINUED | OUTPATIENT
Start: 2025-07-19 | End: 2025-07-28 | Stop reason: HOSPADM

## 2025-07-19 RX ORDER — ONDANSETRON 2 MG/ML
4 INJECTION INTRAMUSCULAR; INTRAVENOUS EVERY 6 HOURS PRN
Status: DISCONTINUED | OUTPATIENT
Start: 2025-07-19 | End: 2025-07-28 | Stop reason: HOSPADM

## 2025-07-19 RX ORDER — CALCIUM CARBONATE 500 MG/1
1000 TABLET, CHEWABLE ORAL 3 TIMES DAILY PRN
Status: DISCONTINUED | OUTPATIENT
Start: 2025-07-19 | End: 2025-07-28 | Stop reason: HOSPADM

## 2025-07-19 RX ORDER — SODIUM CHLORIDE 0.9 % (FLUSH) 0.9 %
5-40 SYRINGE (ML) INJECTION PRN
Status: DISCONTINUED | OUTPATIENT
Start: 2025-07-19 | End: 2025-07-25 | Stop reason: SDUPTHER

## 2025-07-19 RX ORDER — SODIUM CHLORIDE 0.9 % (FLUSH) 0.9 %
5-40 SYRINGE (ML) INJECTION EVERY 12 HOURS SCHEDULED
Status: DISCONTINUED | OUTPATIENT
Start: 2025-07-19 | End: 2025-07-25 | Stop reason: SDUPTHER

## 2025-07-19 RX ORDER — HEPARIN SODIUM 5000 [USP'U]/ML
5000 INJECTION, SOLUTION INTRAVENOUS; SUBCUTANEOUS EVERY 8 HOURS SCHEDULED
Status: DISCONTINUED | OUTPATIENT
Start: 2025-07-19 | End: 2025-07-28 | Stop reason: HOSPADM

## 2025-07-19 RX ORDER — HYDROCODONE BITARTRATE AND ACETAMINOPHEN 10; 325 MG/1; MG/1
1 TABLET ORAL EVERY 6 HOURS PRN
Refills: 0 | Status: DISCONTINUED | OUTPATIENT
Start: 2025-07-19 | End: 2025-07-28 | Stop reason: HOSPADM

## 2025-07-19 RX ORDER — FINASTERIDE 5 MG/1
5 TABLET, FILM COATED ORAL DAILY
Status: DISCONTINUED | OUTPATIENT
Start: 2025-07-19 | End: 2025-07-28 | Stop reason: HOSPADM

## 2025-07-19 RX ORDER — ONDANSETRON 4 MG/1
4 TABLET, ORALLY DISINTEGRATING ORAL EVERY 8 HOURS PRN
Status: DISCONTINUED | OUTPATIENT
Start: 2025-07-19 | End: 2025-07-28 | Stop reason: HOSPADM

## 2025-07-19 RX ORDER — ACETAMINOPHEN 325 MG/1
650 TABLET ORAL EVERY 4 HOURS PRN
Status: DISCONTINUED | OUTPATIENT
Start: 2025-07-19 | End: 2025-07-28 | Stop reason: HOSPADM

## 2025-07-19 RX ORDER — POLYETHYLENE GLYCOL 3350 17 G/17G
17 POWDER, FOR SOLUTION ORAL 2 TIMES DAILY PRN
Status: DISCONTINUED | OUTPATIENT
Start: 2025-07-19 | End: 2025-07-28 | Stop reason: HOSPADM

## 2025-07-19 RX ORDER — SODIUM CHLORIDE 9 MG/ML
INJECTION, SOLUTION INTRAVENOUS PRN
Status: DISCONTINUED | OUTPATIENT
Start: 2025-07-19 | End: 2025-07-25 | Stop reason: SDUPTHER

## 2025-07-19 RX ADMIN — SODIUM BICARBONATE: 84 INJECTION, SOLUTION INTRAVENOUS at 02:20

## 2025-07-19 RX ADMIN — TAMSULOSIN HYDROCHLORIDE 0.4 MG: 0.4 CAPSULE ORAL at 20:43

## 2025-07-19 RX ADMIN — ATORVASTATIN CALCIUM 40 MG: 40 TABLET, FILM COATED ORAL at 02:19

## 2025-07-19 RX ADMIN — HEPARIN SODIUM 5000 UNITS: 5000 INJECTION INTRAVENOUS; SUBCUTANEOUS at 14:40

## 2025-07-19 RX ADMIN — SODIUM ZIRCONIUM CYCLOSILICATE 10 G: 10 POWDER, FOR SUSPENSION ORAL at 20:42

## 2025-07-19 RX ADMIN — SODIUM ZIRCONIUM CYCLOSILICATE 10 G: 10 POWDER, FOR SUSPENSION ORAL at 08:10

## 2025-07-19 RX ADMIN — SODIUM CHLORIDE, PRESERVATIVE FREE 10 ML: 5 INJECTION INTRAVENOUS at 08:10

## 2025-07-19 RX ADMIN — SODIUM ZIRCONIUM CYCLOSILICATE 10 G: 10 POWDER, FOR SUSPENSION ORAL at 14:40

## 2025-07-19 RX ADMIN — HEPARIN SODIUM 5000 UNITS: 5000 INJECTION INTRAVENOUS; SUBCUTANEOUS at 20:42

## 2025-07-19 RX ADMIN — HEPARIN SODIUM 5000 UNITS: 5000 INJECTION INTRAVENOUS; SUBCUTANEOUS at 05:37

## 2025-07-19 RX ADMIN — TAMSULOSIN HYDROCHLORIDE 0.4 MG: 0.4 CAPSULE ORAL at 02:19

## 2025-07-19 RX ADMIN — ATORVASTATIN CALCIUM 40 MG: 40 TABLET, FILM COATED ORAL at 20:42

## 2025-07-19 RX ADMIN — FINASTERIDE 5 MG: 5 TABLET, FILM COATED ORAL at 08:10

## 2025-07-19 RX ADMIN — SODIUM BICARBONATE: 84 INJECTION, SOLUTION INTRAVENOUS at 19:39

## 2025-07-19 RX ADMIN — HYDROCODONE BITARTRATE AND ACETAMINOPHEN 1 TABLET: 10; 325 TABLET ORAL at 14:40

## 2025-07-19 SDOH — ECONOMIC STABILITY: INCOME INSECURITY: HOW HARD IS IT FOR YOU TO PAY FOR THE VERY BASICS LIKE FOOD, HOUSING, MEDICAL CARE, AND HEATING?: NOT HARD AT ALL

## 2025-07-19 SDOH — ECONOMIC STABILITY: FOOD INSECURITY: WITHIN THE PAST 12 MONTHS, YOU WORRIED THAT YOUR FOOD WOULD RUN OUT BEFORE YOU GOT MONEY TO BUY MORE.: NEVER TRUE

## 2025-07-19 SDOH — ECONOMIC STABILITY: INCOME INSECURITY: IN THE PAST 12 MONTHS, HAS THE ELECTRIC, GAS, OIL, OR WATER COMPANY THREATENED TO SHUT OFF SERVICE IN YOUR HOME?: NO

## 2025-07-19 ASSESSMENT — PAIN DESCRIPTION - LOCATION: LOCATION: BACK

## 2025-07-19 ASSESSMENT — PATIENT HEALTH QUESTIONNAIRE - PHQ9
1. LITTLE INTEREST OR PLEASURE IN DOING THINGS: NOT AT ALL
SUM OF ALL RESPONSES TO PHQ QUESTIONS 1-9: 0
SUM OF ALL RESPONSES TO PHQ QUESTIONS 1-9: 0
2. FEELING DOWN, DEPRESSED OR HOPELESS: NOT AT ALL
SUM OF ALL RESPONSES TO PHQ QUESTIONS 1-9: 0
SUM OF ALL RESPONSES TO PHQ QUESTIONS 1-9: 0

## 2025-07-19 ASSESSMENT — PAIN DESCRIPTION - ORIENTATION: ORIENTATION: MID

## 2025-07-19 ASSESSMENT — PAIN SCALES - GENERAL: PAINLEVEL_OUTOF10: 7

## 2025-07-19 ASSESSMENT — PAIN DESCRIPTION - DESCRIPTORS: DESCRIPTORS: ACHING

## 2025-07-19 NOTE — PLAN OF CARE
Problem: Discharge Planning  Goal: Discharge to home or other facility with appropriate resources  7/19/2025 0231 by Dania Antoine, RN  Outcome: Progressing  7/19/2025 0230 by Dania Antoine, RN  Outcome: Progressing     Problem: ABCDS Injury Assessment  Goal: Absence of physical injury  7/19/2025 0231 by Dania Antoine, RN  Outcome: Progressing  7/19/2025 0230 by Dania Antoine, RN  Outcome: Progressing     Problem: Safety - Adult  Goal: Free from fall injury  7/19/2025 0231 by Dania Antoine, RN  Outcome: Progressing  7/19/2025 0230 by Dania Antoine, RN  Outcome: Progressing

## 2025-07-19 NOTE — H&P
and urgency.   Musculoskeletal:  Negative for back pain.   Neurological:  Negative for dizziness, weakness and light-headedness.   Psychiatric/Behavioral:  Negative for confusion.       Past Medical History        Past Medical History:   Diagnosis Date    CKD (chronic kidney disease) stage 4, GFR 15-29 ml/min ([facility])       As per EMR review Hx CKD 4 with GFR 21 and Cr 3.3 on 6JUN24    Full dentures      HLD (hyperlipidemia)      HTN (hypertension)      PVD (peripheral vascular disease)      Tobacco abuse, in remission           Past Psychiatric History:  Denied any     Past Surgical History         Past Surgical History:   Procedure Laterality Date    AXILLARY-FEMORAL BYPASS GRAFT Bilateral 06/10/2024     AXILLARY BI-FEMORAL BYPASS performed by Radha Tapia DO at [facility] OR    CATARACT EXTRACTION EXTRACAPSULAR W/ INTRAOCULAR LENS IMPLANTATION Right 2023    COLONOSCOPY N/A 2024     states rupinder was told that he had a few polyps but was also told it was nothing to worry about    ENDOSCOPY, COLON, DIAGNOSTIC        HAND SURGERY Right      HERNIA REPAIR        MULTIPLE TOOTH EXTRACTIONS Bilateral 2021     ~2021 \"three to for yeras agp\"    VASCULAR SURGERY   03/15/2024     Left femoral access, left leg runoff    VASCULAR SURGERY Right 06/13/2024     RIGHT LOWER EXTERMITY ARTERIOGRAM performed by Radha Tapia DO at [facility] OR    VASCULAR SURGERY   06/13/2024     exploration of right femoral anastomosis. Right leg thrombectomy. Angiogram         Social History         Tobacco History         Smoking Status  Former Smoking Start Date  1/1/1999 Quit Date  1/11/2024 Average Packs/Day  1 pack/day for 25.0 years (25.0 ttl pk-yrs) Smoking Tobacco Type  Cigarettes from 1/1/1999 to 1/11/2024   Pack Year History       Packs/Day From To Years     0 1/11/2024   1.5     1 1/1/1999 1/11/2024 25.0        Passive Exposure  Past        Smokeless Tobacco Use  Never                  Alcohol History         Alcohol Use Status  Not

## 2025-07-19 NOTE — PLAN OF CARE
Problem: Discharge Planning  Goal: Discharge to home or other facility with appropriate resources  7/19/2025 1619 by Zayra oHoks RN  Outcome: Progressing  7/19/2025 0231 by Dania Antoine RN  Outcome: Progressing  7/19/2025 0230 by Dania Antoine, RN  Outcome: Progressing     Problem: ABCDS Injury Assessment  Goal: Absence of physical injury  7/19/2025 1619 by Zayra Hooks RN  Outcome: Progressing  7/19/2025 0231 by Dania Antoine RN  Outcome: Progressing  7/19/2025 0230 by Dania Antoine, RN  Outcome: Progressing     Problem: Safety - Adult  Goal: Free from fall injury  7/19/2025 1619 by Zayra Hooks RN  Outcome: Progressing  7/19/2025 0231 by Dania Antoine, RN  Outcome: Progressing  7/19/2025 0230 by Dania Antoine, RN  Outcome: Progressing

## 2025-07-19 NOTE — PLAN OF CARE
[facility] Hospitalist Group History and Physical    Patient Information:  Patient: Tyler Franks  MRN: [id number]   Acct: [id number]  YOB: 1965  Admit Date: 7/19/2025      Date of Service: 7/19/2025   Primary Care Physician: Narinder Oscar MD  Advance Directive: Full Code  Health Care Proxy: Mrs. Joanie Bloom, his daughter, [phone]        SUBJECTIVE:    Transfer Center Summary:  \"  2054   Expected Arrival of Patient at Destination  *7/18/2025  7:54 PM (CDT)     Call from [facility]   [phone]   Entered By: Elina Yung RN   Updated By: Elina Yung RN, [facility] calling to request transfer for acute renal failure - Analia relayed labs: Creatinine 7.8, GFR 7, K+ 5.4, history of chronic kidney disease but no history of dialysis. Analia states need higher level of care and have no urology at referring.     Call to [facility] [phone] states to proceed with hospitalist acceptance.      \"  Perfect Serve messaging from Transfer Center:  \"Acute Renal Failure\"  \"Last vitals T 36.0 /101 HR 91 RR 23 O2 94% on room air, no drips, K level 5.4 no EKG changes normal sinus rhythm\"    As per EMR review Hx CKD 4 with GFR 21 and Cr 3.3 on 6JUN24    HPI:  Mr Tyler Franks is a pleasant 59 year old  gent. He was sent to get lab work routinely by is PCP, he states that when they called with results they told him to come to the ED. He states that this has only happened to him once before. He states that he was told that he was told it was dehydrated.     Review of Systems:   Review of Systems   Constitutional:  Negative for chills, diaphoresis, fatigue and fever.   Respiratory:  Negative for shortness of breath.    Cardiovascular:  Negative for chest pain and palpitations.   Gastrointestinal:  Negative for abdominal pain and nausea.   Genitourinary:  Positive for frequency. Negative for dysuria, hematuria and urgency.

## 2025-07-20 LAB
ALBUMIN SERPL-MCNC: 2.8 G/DL (ref 3.5–5.2)
ALP SERPL-CCNC: 74 U/L (ref 40–129)
ALT SERPL-CCNC: 19 U/L (ref 10–50)
ANION GAP SERPL CALCULATED.3IONS-SCNC: 12 MMOL/L (ref 8–16)
APTT PPP: 42.4 SEC (ref 26–36.2)
AST SERPL-CCNC: 13 U/L (ref 10–50)
BASOPHILS # BLD: 0.1 K/UL (ref 0–0.2)
BASOPHILS NFR BLD: 1 % (ref 0–1)
BILIRUB SERPL-MCNC: <0.2 MG/DL (ref 0.2–1.2)
BUN SERPL-MCNC: 86 MG/DL (ref 8–23)
CALCIUM SERPL-MCNC: 8.4 MG/DL (ref 8.8–10.2)
CHLORIDE SERPL-SCNC: 108 MMOL/L (ref 98–107)
CO2 SERPL-SCNC: 19 MMOL/L (ref 22–29)
CREAT SERPL-MCNC: 7 MG/DL (ref 0.7–1.2)
EKG P AXIS: 75 DEGREES
EKG P-R INTERVAL: 134 MS
EKG Q-T INTERVAL: 368 MS
EKG QRS DURATION: 80 MS
EKG QTC CALCULATION (BAZETT): 411 MS
EKG T AXIS: 57 DEGREES
EOSINOPHIL # BLD: 0.2 K/UL (ref 0–0.6)
EOSINOPHIL NFR BLD: 2.5 % (ref 0–5)
ERYTHROCYTE [DISTWIDTH] IN BLOOD BY AUTOMATED COUNT: 12.9 % (ref 11.5–14.5)
FERRITIN SERPL-MCNC: 162 NG/ML (ref 30–400)
GLUCOSE SERPL-MCNC: 102 MG/DL (ref 70–99)
HAV IGM SERPL QL IA: NONREACTIVE
HBV CORE IGM SERPL QL IA: NONREACTIVE
HBV SURFACE AB SERPL IA-ACNC: <3.5 M[IU]/ML
HBV SURFACE AG SERPL QL IA: NORMAL
HCT VFR BLD AUTO: 29.6 % (ref 42–52)
HCV AB SERPL QL IA: NORMAL
HGB BLD-MCNC: 9.6 G/DL (ref 14–18)
IMM GRANULOCYTES # BLD: 0 K/UL
INR PPP: 1.13 (ref 0.88–1.18)
LYMPHOCYTES # BLD: 2.7 K/UL (ref 1.1–4.5)
LYMPHOCYTES NFR BLD: 32.5 % (ref 20–40)
MCH RBC QN AUTO: 29.4 PG (ref 27–31)
MCHC RBC AUTO-ENTMCNC: 32.4 G/DL (ref 33–37)
MCV RBC AUTO: 90.8 FL (ref 80–94)
MONOCYTES # BLD: 0.7 K/UL (ref 0–0.9)
MONOCYTES NFR BLD: 8.5 % (ref 0–10)
NEUTROPHILS # BLD: 4.5 K/UL (ref 1.5–7.5)
NEUTS SEG NFR BLD: 55.1 % (ref 50–65)
PLATELET # BLD AUTO: 232 K/UL (ref 130–400)
PMV BLD AUTO: 10.6 FL (ref 9.4–12.4)
POTASSIUM SERPL-SCNC: 4.6 MMOL/L (ref 3.5–5)
POTASSIUM SERPL-SCNC: 4.6 MMOL/L (ref 3.5–5.1)
PROT SERPL-MCNC: 5.3 G/DL (ref 6.4–8.3)
PROTHROMBIN TIME: 14.4 SEC (ref 12–14.6)
RBC # BLD AUTO: 3.26 M/UL (ref 4.7–6.1)
SODIUM SERPL-SCNC: 139 MMOL/L (ref 136–145)
WBC # BLD AUTO: 8.2 K/UL (ref 4.8–10.8)

## 2025-07-20 PROCEDURE — 6370000000 HC RX 637 (ALT 250 FOR IP): Performed by: HOSPITALIST

## 2025-07-20 PROCEDURE — 94760 N-INVAS EAR/PLS OXIMETRY 1: CPT

## 2025-07-20 PROCEDURE — 80053 COMPREHEN METABOLIC PANEL: CPT

## 2025-07-20 PROCEDURE — 2500000003 HC RX 250 WO HCPCS: Performed by: INTERNAL MEDICINE

## 2025-07-20 PROCEDURE — 85610 PROTHROMBIN TIME: CPT

## 2025-07-20 PROCEDURE — 86706 HEP B SURFACE ANTIBODY: CPT

## 2025-07-20 PROCEDURE — 6360000002 HC RX W HCPCS: Performed by: HOSPITALIST

## 2025-07-20 PROCEDURE — 2500000003 HC RX 250 WO HCPCS: Performed by: HOSPITALIST

## 2025-07-20 PROCEDURE — 80074 ACUTE HEPATITIS PANEL: CPT

## 2025-07-20 PROCEDURE — 85025 COMPLETE CBC W/AUTO DIFF WBC: CPT

## 2025-07-20 PROCEDURE — 2580000003 HC RX 258: Performed by: INTERNAL MEDICINE

## 2025-07-20 PROCEDURE — 1200000000 HC SEMI PRIVATE

## 2025-07-20 PROCEDURE — 85730 THROMBOPLASTIN TIME PARTIAL: CPT

## 2025-07-20 PROCEDURE — 36415 COLL VENOUS BLD VENIPUNCTURE: CPT

## 2025-07-20 PROCEDURE — 6370000000 HC RX 637 (ALT 250 FOR IP): Performed by: INTERNAL MEDICINE

## 2025-07-20 PROCEDURE — 82728 ASSAY OF FERRITIN: CPT

## 2025-07-20 RX ORDER — CALCITRIOL 0.25 UG/1
0.25 CAPSULE, LIQUID FILLED ORAL DAILY
Status: DISCONTINUED | OUTPATIENT
Start: 2025-07-20 | End: 2025-07-28 | Stop reason: HOSPADM

## 2025-07-20 RX ADMIN — HEPARIN SODIUM 5000 UNITS: 5000 INJECTION INTRAVENOUS; SUBCUTANEOUS at 05:15

## 2025-07-20 RX ADMIN — TAMSULOSIN HYDROCHLORIDE 0.4 MG: 0.4 CAPSULE ORAL at 21:41

## 2025-07-20 RX ADMIN — SODIUM BICARBONATE: 84 INJECTION, SOLUTION INTRAVENOUS at 13:46

## 2025-07-20 RX ADMIN — HEPARIN SODIUM 5000 UNITS: 5000 INJECTION INTRAVENOUS; SUBCUTANEOUS at 13:45

## 2025-07-20 RX ADMIN — HYDROCODONE BITARTRATE AND ACETAMINOPHEN 1 TABLET: 10; 325 TABLET ORAL at 21:48

## 2025-07-20 RX ADMIN — SODIUM ZIRCONIUM CYCLOSILICATE 10 G: 10 POWDER, FOR SUSPENSION ORAL at 13:45

## 2025-07-20 RX ADMIN — ATORVASTATIN CALCIUM 40 MG: 40 TABLET, FILM COATED ORAL at 21:41

## 2025-07-20 RX ADMIN — FINASTERIDE 5 MG: 5 TABLET, FILM COATED ORAL at 08:02

## 2025-07-20 RX ADMIN — HEPARIN SODIUM 5000 UNITS: 5000 INJECTION INTRAVENOUS; SUBCUTANEOUS at 21:40

## 2025-07-20 RX ADMIN — SODIUM CHLORIDE, PRESERVATIVE FREE 10 ML: 5 INJECTION INTRAVENOUS at 21:41

## 2025-07-20 RX ADMIN — HYDROCODONE BITARTRATE AND ACETAMINOPHEN 1 TABLET: 10; 325 TABLET ORAL at 08:56

## 2025-07-20 RX ADMIN — CALCITRIOL CAPSULES 0.25 MCG 0.25 MCG: 0.25 CAPSULE ORAL at 12:12

## 2025-07-20 RX ADMIN — SODIUM CHLORIDE, PRESERVATIVE FREE 10 ML: 5 INJECTION INTRAVENOUS at 08:02

## 2025-07-20 RX ADMIN — SODIUM ZIRCONIUM CYCLOSILICATE 10 G: 10 POWDER, FOR SUSPENSION ORAL at 08:02

## 2025-07-20 RX ADMIN — SODIUM ZIRCONIUM CYCLOSILICATE 10 G: 10 POWDER, FOR SUSPENSION ORAL at 21:40

## 2025-07-20 ASSESSMENT — PAIN DESCRIPTION - LOCATION
LOCATION: BACK
LOCATION: BACK

## 2025-07-20 ASSESSMENT — PAIN SCALES - GENERAL
PAINLEVEL_OUTOF10: 4
PAINLEVEL_OUTOF10: 6
PAINLEVEL_OUTOF10: 7

## 2025-07-20 ASSESSMENT — PAIN DESCRIPTION - ORIENTATION: ORIENTATION: LOWER

## 2025-07-20 ASSESSMENT — PAIN DESCRIPTION - DESCRIPTORS: DESCRIPTORS: ACHING

## 2025-07-20 NOTE — PLAN OF CARE
Problem: Discharge Planning  Goal: Discharge to home or other facility with appropriate resources  7/20/2025 0935 by Zayra Hooks RN  Outcome: Progressing  7/19/2025 2224 by Dania Antoine RN  Outcome: Progressing     Problem: ABCDS Injury Assessment  Goal: Absence of physical injury  7/20/2025 0935 by Zayra Hooks RN  Outcome: Progressing  7/19/2025 2224 by Dania Antoine, RN  Outcome: Progressing     Problem: Safety - Adult  Goal: Free from fall injury  7/20/2025 0935 by Zayra Hooks RN  Outcome: Progressing  7/19/2025 2224 by Dania Antoine, RN  Outcome: Progressing

## 2025-07-20 NOTE — PLAN OF CARE
Problem: Discharge Planning  Goal: Discharge to home or other facility with appropriate resources  7/19/2025 2224 by Dania Antonie, RN  Outcome: Progressing  7/19/2025 1619 by Zayra Hooks RN  Outcome: Progressing     Problem: ABCDS Injury Assessment  Goal: Absence of physical injury  7/19/2025 2224 by Dania Antoine, RN  Outcome: Progressing  7/19/2025 1619 by Zayra Hooks RN  Outcome: Progressing     Problem: Safety - Adult  Goal: Free from fall injury  7/19/2025 2224 by Dania Antoine, RN  Outcome: Progressing  7/19/2025 1619 by Zayra Hooks RN  Outcome: Progressing

## 2025-07-21 ENCOUNTER — APPOINTMENT (OUTPATIENT)
Dept: CT IMAGING | Age: 60
DRG: 674 | End: 2025-07-21
Attending: HOSPITALIST
Payer: MEDICAID

## 2025-07-21 PROBLEM — N13.8 BENIGN PROSTATIC HYPERPLASIA WITH URINARY OBSTRUCTION: Status: ACTIVE | Noted: 2025-07-21

## 2025-07-21 PROBLEM — N40.1 BENIGN PROSTATIC HYPERPLASIA WITH URINARY OBSTRUCTION: Status: ACTIVE | Noted: 2025-07-21

## 2025-07-21 PROBLEM — N13.30 HYDRONEPHROSIS, BILATERAL: Status: ACTIVE | Noted: 2025-07-21

## 2025-07-21 LAB
ALBUMIN SERPL-MCNC: 2.6 G/DL (ref 3.5–5.2)
ALP SERPL-CCNC: 92 U/L (ref 40–129)
ALT SERPL-CCNC: 25 U/L (ref 10–50)
ANION GAP SERPL CALCULATED.3IONS-SCNC: 11 MMOL/L (ref 8–16)
AST SERPL-CCNC: 22 U/L (ref 10–50)
BASOPHILS # BLD: 0.1 K/UL (ref 0–0.2)
BASOPHILS NFR BLD: 0.8 % (ref 0–1)
BILIRUB SERPL-MCNC: <0.2 MG/DL (ref 0.2–1.2)
BUN SERPL-MCNC: 76 MG/DL (ref 8–23)
CALCIUM SERPL-MCNC: 7.4 MG/DL (ref 8.8–10.2)
CHLORIDE SERPL-SCNC: 107 MMOL/L (ref 98–107)
CO2 SERPL-SCNC: 21 MMOL/L (ref 22–29)
CREAT SERPL-MCNC: 6.8 MG/DL (ref 0.7–1.2)
EOSINOPHIL # BLD: 0.2 K/UL (ref 0–0.6)
EOSINOPHIL NFR BLD: 2.4 % (ref 0–5)
ERYTHROCYTE [DISTWIDTH] IN BLOOD BY AUTOMATED COUNT: 12.8 % (ref 11.5–14.5)
GLUCOSE SERPL-MCNC: 117 MG/DL (ref 70–99)
HCT VFR BLD AUTO: 28.1 % (ref 42–52)
HGB BLD-MCNC: 9.3 G/DL (ref 14–18)
IMM GRANULOCYTES # BLD: 0.1 K/UL
LYMPHOCYTES # BLD: 2.7 K/UL (ref 1.1–4.5)
LYMPHOCYTES NFR BLD: 26.7 % (ref 20–40)
MCH RBC QN AUTO: 29.9 PG (ref 27–31)
MCHC RBC AUTO-ENTMCNC: 33.1 G/DL (ref 33–37)
MCV RBC AUTO: 90.4 FL (ref 80–94)
MONOCYTES # BLD: 1 K/UL (ref 0–0.9)
MONOCYTES NFR BLD: 9.7 % (ref 0–10)
NEUTROPHILS # BLD: 6.1 K/UL (ref 1.5–7.5)
NEUTS SEG NFR BLD: 59.9 % (ref 50–65)
PLATELET # BLD AUTO: 226 K/UL (ref 130–400)
PMV BLD AUTO: 10.6 FL (ref 9.4–12.4)
POTASSIUM SERPL-SCNC: 3.7 MMOL/L (ref 3.5–5)
POTASSIUM SERPL-SCNC: 3.7 MMOL/L (ref 3.5–5.1)
PROT SERPL-MCNC: 4.8 G/DL (ref 6.4–8.3)
RBC # BLD AUTO: 3.11 M/UL (ref 4.7–6.1)
SODIUM SERPL-SCNC: 139 MMOL/L (ref 136–145)
WBC # BLD AUTO: 10.1 K/UL (ref 4.8–10.8)

## 2025-07-21 PROCEDURE — 99222 1ST HOSP IP/OBS MODERATE 55: CPT | Performed by: UROLOGY

## 2025-07-21 PROCEDURE — 6370000000 HC RX 637 (ALT 250 FOR IP): Performed by: HOSPITALIST

## 2025-07-21 PROCEDURE — 74176 CT ABD & PELVIS W/O CONTRAST: CPT

## 2025-07-21 PROCEDURE — 2500000003 HC RX 250 WO HCPCS: Performed by: HOSPITALIST

## 2025-07-21 PROCEDURE — 6370000000 HC RX 637 (ALT 250 FOR IP): Performed by: INTERNAL MEDICINE

## 2025-07-21 PROCEDURE — 36415 COLL VENOUS BLD VENIPUNCTURE: CPT

## 2025-07-21 PROCEDURE — 85025 COMPLETE CBC W/AUTO DIFF WBC: CPT

## 2025-07-21 PROCEDURE — 51702 INSERT TEMP BLADDER CATH: CPT

## 2025-07-21 PROCEDURE — 2500000003 HC RX 250 WO HCPCS: Performed by: INTERNAL MEDICINE

## 2025-07-21 PROCEDURE — 2580000003 HC RX 258: Performed by: INTERNAL MEDICINE

## 2025-07-21 PROCEDURE — 1200000000 HC SEMI PRIVATE

## 2025-07-21 PROCEDURE — 80053 COMPREHEN METABOLIC PANEL: CPT

## 2025-07-21 PROCEDURE — 6360000002 HC RX W HCPCS: Performed by: HOSPITALIST

## 2025-07-21 PROCEDURE — 94760 N-INVAS EAR/PLS OXIMETRY 1: CPT

## 2025-07-21 RX ADMIN — HEPARIN SODIUM 5000 UNITS: 5000 INJECTION INTRAVENOUS; SUBCUTANEOUS at 15:33

## 2025-07-21 RX ADMIN — FINASTERIDE 5 MG: 5 TABLET, FILM COATED ORAL at 11:39

## 2025-07-21 RX ADMIN — HEPARIN SODIUM 5000 UNITS: 5000 INJECTION INTRAVENOUS; SUBCUTANEOUS at 20:26

## 2025-07-21 RX ADMIN — SODIUM CHLORIDE, PRESERVATIVE FREE 10 ML: 5 INJECTION INTRAVENOUS at 11:39

## 2025-07-21 RX ADMIN — CALCITRIOL CAPSULES 0.25 MCG 0.25 MCG: 0.25 CAPSULE ORAL at 11:38

## 2025-07-21 RX ADMIN — SODIUM BICARBONATE: 84 INJECTION, SOLUTION INTRAVENOUS at 00:55

## 2025-07-21 RX ADMIN — HYDROCODONE BITARTRATE AND ACETAMINOPHEN 1 TABLET: 10; 325 TABLET ORAL at 20:26

## 2025-07-21 RX ADMIN — TAMSULOSIN HYDROCHLORIDE 0.4 MG: 0.4 CAPSULE ORAL at 20:26

## 2025-07-21 RX ADMIN — HEPARIN SODIUM 5000 UNITS: 5000 INJECTION INTRAVENOUS; SUBCUTANEOUS at 11:39

## 2025-07-21 RX ADMIN — ATORVASTATIN CALCIUM 40 MG: 40 TABLET, FILM COATED ORAL at 20:26

## 2025-07-21 RX ADMIN — HYDROCODONE BITARTRATE AND ACETAMINOPHEN 1 TABLET: 10; 325 TABLET ORAL at 11:45

## 2025-07-21 ASSESSMENT — PAIN DESCRIPTION - ORIENTATION: ORIENTATION: LOWER

## 2025-07-21 ASSESSMENT — PAIN SCALES - GENERAL
PAINLEVEL_OUTOF10: 7
PAINLEVEL_OUTOF10: 5
PAINLEVEL_OUTOF10: 0
PAINLEVEL_OUTOF10: 3
PAINLEVEL_OUTOF10: 6
PAINLEVEL_OUTOF10: 3

## 2025-07-21 ASSESSMENT — PAIN DESCRIPTION - DESCRIPTORS
DESCRIPTORS: ACHING;DISCOMFORT;DULL
DESCRIPTORS: ACHING

## 2025-07-21 ASSESSMENT — PAIN SCALES - WONG BAKER: WONGBAKER_NUMERICALRESPONSE: NO HURT

## 2025-07-21 ASSESSMENT — PAIN DESCRIPTION - LOCATION: LOCATION: BACK

## 2025-07-21 NOTE — CARE COORDINATION
Case Management Assessment  Initial Evaluation    Date/Time of Evaluation: 7/21/2025 10:22 AM  Assessment Completed by: Zahida Ortega    If patient is discharged prior to next notation, then this note serves as note for discharge by case management.    Patient Name: Tyler Franks                   YOB: 1965  Diagnosis: Acute renal failure [N17.9]  Acute kidney injury superimposed on stage 4 chronic kidney disease (HCC) [N17.9, N18.4]                   Date / Time: 7/19/2025  1:07 AM    Patient Admission Status: Inpatient   Readmission Risk (Low < 19, Mod (19-27), High > 27): Readmission Risk Score: 14.8    Current PCP: Narinder Oscar MD  PCP verified by CM? Yes    Chart Reviewed: Yes      History Provided by: Patient  Patient Orientation: Alert and Oriented    Patient Cognition: Alert    Hospitalization in the last 30 days (Readmission):  No    If yes, Readmission Assessment in CM Navigator will be completed.    Advance Directives:      Code Status: Full Code   Patient's Primary Decision Maker is: Legal Next of Kin      Discharge Planning:    Patient lives with: (P) Alone Type of Home: (P) Apartment  Primary Care Giver: Self  Patient Support Systems include: Children   Current Financial resources: Medicaid  Current community resources: None  Current services prior to admission: (P) None            Current DME:              Type of Home Care services:  (P) None    ADLS  Prior functional level: Independent in ADLs/IADLs  Current functional level: Independent in ADLs/IADLs    PT AM-PAC:   /24  OT AM-PAC:   /24    Family can provide assistance at DC: Yes  Would you like Case Management to discuss the discharge plan with any other family members/significant others, and if so, who? Yes  Plans to Return to Present Housing: Yes  Potential Assistance needed at discharge: (P) N/A            Potential DME:  denies needs  Patient expects to discharge to: (P) Apartment  Plan for transportation at

## 2025-07-22 LAB
ALBUMIN SERPL-MCNC: 2.8 G/DL (ref 3.5–5.2)
ALP SERPL-CCNC: 84 U/L (ref 40–129)
ALT SERPL-CCNC: 21 U/L (ref 10–50)
ANION GAP SERPL CALCULATED.3IONS-SCNC: 11 MMOL/L (ref 8–16)
AST SERPL-CCNC: 17 U/L (ref 10–50)
BASOPHILS # BLD: 0.1 K/UL (ref 0–0.2)
BASOPHILS NFR BLD: 0.9 % (ref 0–1)
BILIRUB SERPL-MCNC: <0.2 MG/DL (ref 0.2–1.2)
BUN SERPL-MCNC: 69 MG/DL (ref 8–23)
CALCIUM SERPL-MCNC: 8.2 MG/DL (ref 8.8–10.2)
CHLORIDE SERPL-SCNC: 108 MMOL/L (ref 98–107)
CO2 SERPL-SCNC: 22 MMOL/L (ref 22–29)
CREAT SERPL-MCNC: 7 MG/DL (ref 0.7–1.2)
EOSINOPHIL # BLD: 0.2 K/UL (ref 0–0.6)
EOSINOPHIL NFR BLD: 2.3 % (ref 0–5)
ERYTHROCYTE [DISTWIDTH] IN BLOOD BY AUTOMATED COUNT: 12.8 % (ref 11.5–14.5)
GLUCOSE SERPL-MCNC: 98 MG/DL (ref 70–99)
HCT VFR BLD AUTO: 30.2 % (ref 42–52)
HGB BLD-MCNC: 9.7 G/DL (ref 14–18)
IMM GRANULOCYTES # BLD: 0 K/UL
LYMPHOCYTES # BLD: 2.4 K/UL (ref 1.1–4.5)
LYMPHOCYTES NFR BLD: 27.6 % (ref 20–40)
MCH RBC QN AUTO: 30 PG (ref 27–31)
MCHC RBC AUTO-ENTMCNC: 32.1 G/DL (ref 33–37)
MCV RBC AUTO: 93.5 FL (ref 80–94)
MONOCYTES # BLD: 0.9 K/UL (ref 0–0.9)
MONOCYTES NFR BLD: 9.7 % (ref 0–10)
NEUTROPHILS # BLD: 5.2 K/UL (ref 1.5–7.5)
NEUTS SEG NFR BLD: 59.2 % (ref 50–65)
PLATELET # BLD AUTO: 217 K/UL (ref 130–400)
PMV BLD AUTO: 10.8 FL (ref 9.4–12.4)
POTASSIUM SERPL-SCNC: 4.1 MMOL/L (ref 3.5–5)
POTASSIUM SERPL-SCNC: 4.1 MMOL/L (ref 3.5–5.1)
PROT SERPL-MCNC: 5 G/DL (ref 6.4–8.3)
RBC # BLD AUTO: 3.23 M/UL (ref 4.7–6.1)
SODIUM SERPL-SCNC: 141 MMOL/L (ref 136–145)
WBC # BLD AUTO: 8.8 K/UL (ref 4.8–10.8)

## 2025-07-22 PROCEDURE — 94760 N-INVAS EAR/PLS OXIMETRY 1: CPT

## 2025-07-22 PROCEDURE — 36415 COLL VENOUS BLD VENIPUNCTURE: CPT

## 2025-07-22 PROCEDURE — 6360000002 HC RX W HCPCS: Performed by: HOSPITALIST

## 2025-07-22 PROCEDURE — 2500000003 HC RX 250 WO HCPCS: Performed by: HOSPITALIST

## 2025-07-22 PROCEDURE — 80053 COMPREHEN METABOLIC PANEL: CPT

## 2025-07-22 PROCEDURE — 6370000000 HC RX 637 (ALT 250 FOR IP): Performed by: INTERNAL MEDICINE

## 2025-07-22 PROCEDURE — 99232 SBSQ HOSP IP/OBS MODERATE 35: CPT | Performed by: UROLOGY

## 2025-07-22 PROCEDURE — 6370000000 HC RX 637 (ALT 250 FOR IP): Performed by: HOSPITALIST

## 2025-07-22 PROCEDURE — 85025 COMPLETE CBC W/AUTO DIFF WBC: CPT

## 2025-07-22 PROCEDURE — 1200000000 HC SEMI PRIVATE

## 2025-07-22 RX ADMIN — HEPARIN SODIUM 5000 UNITS: 5000 INJECTION INTRAVENOUS; SUBCUTANEOUS at 20:00

## 2025-07-22 RX ADMIN — TAMSULOSIN HYDROCHLORIDE 0.4 MG: 0.4 CAPSULE ORAL at 20:00

## 2025-07-22 RX ADMIN — ATORVASTATIN CALCIUM 40 MG: 40 TABLET, FILM COATED ORAL at 20:00

## 2025-07-22 RX ADMIN — CALCITRIOL CAPSULES 0.25 MCG 0.25 MCG: 0.25 CAPSULE ORAL at 09:37

## 2025-07-22 RX ADMIN — HYDROCODONE BITARTRATE AND ACETAMINOPHEN 1 TABLET: 10; 325 TABLET ORAL at 05:43

## 2025-07-22 RX ADMIN — FINASTERIDE 5 MG: 5 TABLET, FILM COATED ORAL at 09:37

## 2025-07-22 RX ADMIN — HEPARIN SODIUM 5000 UNITS: 5000 INJECTION INTRAVENOUS; SUBCUTANEOUS at 14:53

## 2025-07-22 RX ADMIN — HEPARIN SODIUM 5000 UNITS: 5000 INJECTION INTRAVENOUS; SUBCUTANEOUS at 05:44

## 2025-07-22 RX ADMIN — HYDROCODONE BITARTRATE AND ACETAMINOPHEN 1 TABLET: 10; 325 TABLET ORAL at 20:07

## 2025-07-22 RX ADMIN — SODIUM CHLORIDE, PRESERVATIVE FREE 10 ML: 5 INJECTION INTRAVENOUS at 20:10

## 2025-07-22 RX ADMIN — SODIUM CHLORIDE, PRESERVATIVE FREE 10 ML: 5 INJECTION INTRAVENOUS at 09:37

## 2025-07-22 ASSESSMENT — PAIN SCALES - GENERAL
PAINLEVEL_OUTOF10: 7
PAINLEVEL_OUTOF10: 3
PAINLEVEL_OUTOF10: 7

## 2025-07-22 ASSESSMENT — PAIN DESCRIPTION - ORIENTATION
ORIENTATION: LOWER
ORIENTATION: LOWER

## 2025-07-22 ASSESSMENT — PAIN DESCRIPTION - DESCRIPTORS: DESCRIPTORS: ACHING

## 2025-07-22 ASSESSMENT — PAIN DESCRIPTION - LOCATION
LOCATION: BACK
LOCATION: BACK

## 2025-07-22 NOTE — PLAN OF CARE
Problem: Discharge Planning  Goal: Discharge to home or other facility with appropriate resources  7/22/2025 0034 by Seema Padilla RN  Outcome: Progressing  Flowsheets (Taken 7/21/2025 2206)  Discharge to home or other facility with appropriate resources:   Identify barriers to discharge with patient and caregiver   Arrange for needed discharge resources and transportation as appropriate   Identify discharge learning needs (meds, wound care, etc)   Refer to discharge planning if patient needs post-hospital services based on physician order or complex needs related to functional status, cognitive ability or social support system  7/21/2025 1831 by Yaneli Moncada LPN  Outcome: Progressing     Problem: ABCDS Injury Assessment  Goal: Absence of physical injury  7/22/2025 0034 by Seema Padilla RN  Outcome: Progressing  7/21/2025 1831 by Yaneli Moncada LPN  Outcome: Progressing     Problem: Safety - Adult  Goal: Free from fall injury  7/22/2025 0034 by Seema Padilla RN  Outcome: Progressing  7/21/2025 1831 by Yaneli Moncada LPN  Outcome: Progressing     Problem: Pain  Goal: Verbalizes/displays adequate comfort level or baseline comfort level  7/22/2025 0034 by Seema Padilla RN  Outcome: Progressing  7/21/2025 1831 by Yaneli Moncada LPN  Outcome: Progressing

## 2025-07-22 NOTE — PLAN OF CARE
Problem: Discharge Planning  Goal: Discharge to home or other facility with appropriate resources  Outcome: Progressing     Problem: ABCDS Injury Assessment  Goal: Absence of physical injury  Outcome: Progressing     Problem: Safety - Adult  Goal: Free from fall injury  Outcome: Progressing     Problem: Pain  Goal: Verbalizes/displays adequate comfort level or baseline comfort level  Outcome: Progressing     Problem: Genitourinary - Adult  Goal: Absence of urinary retention  Outcome: Progressing     Problem: Metabolic/Fluid and Electrolytes - Adult  Goal: Electrolytes maintained within normal limits  Outcome: Progressing  Goal: Glucose maintained within prescribed range  Outcome: Progressing

## 2025-07-23 LAB
ALBUMIN SERPL-MCNC: 2.8 G/DL (ref 3.5–5.2)
ALP SERPL-CCNC: 84 U/L (ref 40–129)
ALT SERPL-CCNC: 19 U/L (ref 10–50)
ANION GAP SERPL CALCULATED.3IONS-SCNC: 11 MMOL/L (ref 8–16)
AST SERPL-CCNC: 16 U/L (ref 10–50)
BASOPHILS # BLD: 0.1 K/UL (ref 0–0.2)
BASOPHILS NFR BLD: 0.9 % (ref 0–1)
BILIRUB SERPL-MCNC: <0.2 MG/DL (ref 0.2–1.2)
BUN SERPL-MCNC: 65 MG/DL (ref 8–23)
CALCIUM SERPL-MCNC: 8.9 MG/DL (ref 8.8–10.2)
CHLORIDE SERPL-SCNC: 107 MMOL/L (ref 98–107)
CO2 SERPL-SCNC: 21 MMOL/L (ref 22–29)
CREAT SERPL-MCNC: 6.8 MG/DL (ref 0.7–1.2)
EOSINOPHIL # BLD: 0.2 K/UL (ref 0–0.6)
EOSINOPHIL NFR BLD: 2 % (ref 0–5)
ERYTHROCYTE [DISTWIDTH] IN BLOOD BY AUTOMATED COUNT: 12.7 % (ref 11.5–14.5)
GLUCOSE SERPL-MCNC: 106 MG/DL (ref 70–99)
HCT VFR BLD AUTO: 30.8 % (ref 42–52)
HGB BLD-MCNC: 10.1 G/DL (ref 14–18)
IMM GRANULOCYTES # BLD: 0 K/UL
LYMPHOCYTES # BLD: 2.8 K/UL (ref 1.1–4.5)
LYMPHOCYTES NFR BLD: 26.7 % (ref 20–40)
MCH RBC QN AUTO: 30.1 PG (ref 27–31)
MCHC RBC AUTO-ENTMCNC: 32.8 G/DL (ref 33–37)
MCV RBC AUTO: 91.9 FL (ref 80–94)
MONOCYTES # BLD: 0.9 K/UL (ref 0–0.9)
MONOCYTES NFR BLD: 8.2 % (ref 0–10)
MRSA DNA SPEC QL NAA+PROBE: NOT DETECTED
NEUTROPHILS # BLD: 6.5 K/UL (ref 1.5–7.5)
NEUTS SEG NFR BLD: 61.8 % (ref 50–65)
PLATELET # BLD AUTO: 215 K/UL (ref 130–400)
PMV BLD AUTO: 10.5 FL (ref 9.4–12.4)
POTASSIUM SERPL-SCNC: 4.7 MMOL/L (ref 3.5–5)
POTASSIUM SERPL-SCNC: 4.7 MMOL/L (ref 3.5–5.1)
PROT SERPL-MCNC: 5.2 G/DL (ref 6.4–8.3)
RBC # BLD AUTO: 3.35 M/UL (ref 4.7–6.1)
SODIUM SERPL-SCNC: 139 MMOL/L (ref 136–145)
WBC # BLD AUTO: 10.4 K/UL (ref 4.8–10.8)

## 2025-07-23 PROCEDURE — 2500000003 HC RX 250 WO HCPCS: Performed by: HOSPITALIST

## 2025-07-23 PROCEDURE — 99231 SBSQ HOSP IP/OBS SF/LOW 25: CPT | Performed by: UROLOGY

## 2025-07-23 PROCEDURE — 36415 COLL VENOUS BLD VENIPUNCTURE: CPT

## 2025-07-23 PROCEDURE — 94760 N-INVAS EAR/PLS OXIMETRY 1: CPT

## 2025-07-23 PROCEDURE — 51798 US URINE CAPACITY MEASURE: CPT

## 2025-07-23 PROCEDURE — 80053 COMPREHEN METABOLIC PANEL: CPT

## 2025-07-23 PROCEDURE — 85025 COMPLETE CBC W/AUTO DIFF WBC: CPT

## 2025-07-23 PROCEDURE — 87641 MR-STAPH DNA AMP PROBE: CPT

## 2025-07-23 PROCEDURE — 6370000000 HC RX 637 (ALT 250 FOR IP): Performed by: HOSPITALIST

## 2025-07-23 PROCEDURE — 6370000000 HC RX 637 (ALT 250 FOR IP): Performed by: INTERNAL MEDICINE

## 2025-07-23 PROCEDURE — 6370000000 HC RX 637 (ALT 250 FOR IP): Performed by: SURGERY

## 2025-07-23 PROCEDURE — 1200000000 HC SEMI PRIVATE

## 2025-07-23 PROCEDURE — 99222 1ST HOSP IP/OBS MODERATE 55: CPT | Performed by: PHYSICIAN ASSISTANT

## 2025-07-23 PROCEDURE — 6360000002 HC RX W HCPCS: Performed by: HOSPITALIST

## 2025-07-23 RX ORDER — MUPIROCIN 2 %
OINTMENT (GRAM) TOPICAL 2 TIMES DAILY
Status: COMPLETED | OUTPATIENT
Start: 2025-07-23 | End: 2025-07-27

## 2025-07-23 RX ORDER — CHLORHEXIDINE GLUCONATE 40 MG/ML
SOLUTION TOPICAL ONCE
Status: COMPLETED | OUTPATIENT
Start: 2025-07-23 | End: 2025-07-23

## 2025-07-23 RX ADMIN — SODIUM CHLORIDE, PRESERVATIVE FREE 10 ML: 5 INJECTION INTRAVENOUS at 20:14

## 2025-07-23 RX ADMIN — Medication 5 MG: at 20:14

## 2025-07-23 RX ADMIN — SODIUM CHLORIDE, PRESERVATIVE FREE 10 ML: 5 INJECTION INTRAVENOUS at 08:57

## 2025-07-23 RX ADMIN — ATORVASTATIN CALCIUM 40 MG: 40 TABLET, FILM COATED ORAL at 20:14

## 2025-07-23 RX ADMIN — MUPIROCIN: 20 OINTMENT TOPICAL at 13:22

## 2025-07-23 RX ADMIN — MUPIROCIN: 20 OINTMENT TOPICAL at 20:15

## 2025-07-23 RX ADMIN — HEPARIN SODIUM 5000 UNITS: 5000 INJECTION INTRAVENOUS; SUBCUTANEOUS at 20:14

## 2025-07-23 RX ADMIN — TAMSULOSIN HYDROCHLORIDE 0.4 MG: 0.4 CAPSULE ORAL at 20:14

## 2025-07-23 RX ADMIN — CHLORHEXIDINE GLUCONATE 118 ML: 4 SOLUTION TOPICAL at 20:15

## 2025-07-23 RX ADMIN — HEPARIN SODIUM 5000 UNITS: 5000 INJECTION INTRAVENOUS; SUBCUTANEOUS at 13:23

## 2025-07-23 RX ADMIN — FINASTERIDE 5 MG: 5 TABLET, FILM COATED ORAL at 08:57

## 2025-07-23 RX ADMIN — HYDROCODONE BITARTRATE AND ACETAMINOPHEN 1 TABLET: 10; 325 TABLET ORAL at 15:49

## 2025-07-23 RX ADMIN — CALCITRIOL CAPSULES 0.25 MCG 0.25 MCG: 0.25 CAPSULE ORAL at 08:57

## 2025-07-23 ASSESSMENT — PAIN DESCRIPTION - PAIN TYPE: TYPE: CHRONIC PAIN

## 2025-07-23 ASSESSMENT — PAIN SCALES - GENERAL
PAINLEVEL_OUTOF10: 6
PAINLEVEL_OUTOF10: 0

## 2025-07-23 ASSESSMENT — PAIN DESCRIPTION - FREQUENCY: FREQUENCY: INTERMITTENT

## 2025-07-23 ASSESSMENT — PAIN DESCRIPTION - LOCATION: LOCATION: BACK

## 2025-07-23 ASSESSMENT — PAIN - FUNCTIONAL ASSESSMENT: PAIN_FUNCTIONAL_ASSESSMENT: PREVENTS OR INTERFERES SOME ACTIVE ACTIVITIES AND ADLS

## 2025-07-23 ASSESSMENT — PAIN DESCRIPTION - ORIENTATION: ORIENTATION: LOWER

## 2025-07-23 ASSESSMENT — PAIN DESCRIPTION - ONSET: ONSET: PROGRESSIVE

## 2025-07-23 ASSESSMENT — PAIN DESCRIPTION - DESCRIPTORS: DESCRIPTORS: ACHING

## 2025-07-23 NOTE — CONSULTS
[facility] Vascular Surgery    CONSULT    Patient:  Tyler Franks  YOB: 1965  Date of Service: 7/23/2025  MRN: [id number]   Acct: [id number]   Primary Care Physician: Narinder Oscar MD    Reason for consult: Permacath placement    Requesting Physician: Dr. Frost    History Obtained From: Patient and chart review    HISTORY OF PRESENT ILLNESS:  Mr. Tyler Franks is a 59 y.o. male who presented to [facility] with hyperkalemia and worsening renal function. He is known to vascular surgery with hx axillary fem-fem bypass 06/2024. Nephrology consulted and initiated IV hydration with Urology consultation for postobstructive concerns. Renal function has not improved, therefore vascular surgery consulted for permacath placement.       Past Medical History:       Diagnosis Date    CKD (chronic kidney disease) stage 4, GFR 15-29 ml/min ([facility])     As per EMR review Hx CKD 4 with GFR 21 and Cr 3.3 on 6JUN24    Full dentures     HLD (hyperlipidemia)     HTN (hypertension)     PVD (peripheral vascular disease)     Tobacco abuse, in remission        Past Surgical History:        Procedure Laterality Date    AXILLARY-FEMORAL BYPASS GRAFT Bilateral 06/10/2024    AXILLARY BI-FEMORAL BYPASS performed by Radha Tapia DO at [facility] OR    CATARACT EXTRACTION EXTRACAPSULAR W/ INTRAOCULAR LENS IMPLANTATION Right 2023    COLONOSCOPY N/A 2024    states rupinder was told that he had a few polyps but was also told it was nothing to worry about    ENDOSCOPY, COLON, DIAGNOSTIC      HAND SURGERY Right     HERNIA REPAIR      MULTIPLE TOOTH EXTRACTIONS Bilateral 2021    ~2021 \"three to for yeras agp\"    VASCULAR SURGERY  03/15/2024    Left femoral access, left leg runoff    VASCULAR SURGERY Right 06/13/2024    RIGHT LOWER EXTERMITY ARTERIOGRAM performed by Radha Tapia DO at [facility] OR    VASCULAR SURGERY  06/13/2024    exploration of right femoral anastomosis. Right leg thrombectomy. Angiogram       Allergies:  Patient has no 
Consult Note            Date:7/21/2025        Patient Name:Tyler Franks     YOB: 1965     Age:59 y.o.    Inpatient consult to Urology  Consult performed by: Grabiel Pena MD  Consult ordered by: Wilton Frost MD  Reason for consult: BPH obstructive voiding symptoms, mild hydronephrosis, urinary retention          Chief Complaint   No chief complaint on file.     ”I have renal failure”    History Obtained From   patient, electronic medical record    History of Present Illness   Patient 59-year-old  male who was told to come to the emergency department because of elevated creatinine.  Patient has a history of chronic kidney disease.  He also has a history of BPH and takes tamsulosin and finasteride and has been followed by Dr. Whelan in [facility].  Says he saw him about 6 months ago and Dr. Whelan did cystoscopy on him.  When he came in his creatinine is up to 7.  A bladder scan was obtained which was 259 and Dunn catheter was placed.  Since that time with bladder decompression with Dunn catheter his creatinine has not gotten that much better.  Down to 6.8.  He did not have a large volume out his urine output overnight was 4500.  He endorses frequency mild feeling of incomplete emptying but states he goes with good flow he has nocturia about 4-5 times at night.  No dysuria or hematuria no family history of prostate cancer he does not recall ever being checked for prostate cancer having a PSA and I did not find any records in Care Everywhere.  Urinalysis after catheter placement showed moderate blood.  Because of TEMITOPE on CKD a renal ultrasound was done and this shows some mild bilateral hydronephrosis however the bladder was not distended.  Urology has been consulted    Past Medical History     Past Medical History:   Diagnosis Date    CKD (chronic kidney disease) stage 4, GFR 15-29 ml/min ([facility])     As per EMR review Hx CKD 4 with GFR 21 and Cr 3.3 on 6JUN24    Full 
  ALKPHOS 94     PT/INR: No results for input(s): \"PROTIME\", \"INR\" in the last 72 hours.  APTT: No results for input(s): \"APTT\" in the last 72 hours.  BNP:  No results for input(s): \"BNP\" in the last 72 hours.  Ionized Calcium:Invalid input(s): \"IONCA\"  Magnesium:  Recent Labs     07/19/25  0142   MG 1.6     Phosphorus:  Recent Labs     07/19/25  0142   PHOS 5.8*     HgbA1C: No results for input(s): \"LABA1C\" in the last 72 hours.  Hepatic:   Recent Labs     07/19/25  0142   ALKPHOS 94   ALT 27   AST 22   BILITOT <0.2     Lactic Acid: No results for input(s): \"LACTA\" in the last 72 hours.  Troponin:   Recent Labs     07/19/25  0142   CKTOTAL 217     ABGs: No results for input(s): \"PH\", \"PCO2\", \"PO2\", \"HCO3\", \"O2SAT\" in the last 72 hours.  CRP:  No results for input(s): \"CRP\" in the last 72 hours.  Sed Rate:  No results for input(s): \"SEDRATE\" in the last 72 hours.      Cultures:   No results for input(s): \"CULTURE\" in the last 72 hours.  No results for input(s): \"BC\", \"BLOODCULT2\" in the last 72 hours.  No results for input(s): \"CXSURG\" in the last 72 hours.    Radiology reports as per the Radiologist  Radiology: US RETROPERITONEAL COMPLETE  Result Date: 7/19/2025  EXAM:  RETROPERITONEAL SONOGRAM.  Date:  July 19, 2025  Comparison:  None  HISTORY:  Acute kidney injury  TECHNIQUE:  Sonography of the kidneys and bladder was performed.  FINDINGS:  Right Kidney:  The kidney measures 9.9 x 5.0 x 5.4 cm.. The echotexture and cortical thickness are normal. There is mild hydronephrosis. No calculi are seen.  Left kidney:  The kidney measures 9.9 x 5.1 x 4.6 cm.. The echotexture and cortical thickness are normal. There is a 3.6 x 4.1 x 4.1 cm cyst. There is mild hydronephrosis. No calculi are seen.  Bladder: The bladder is underdistended, with diffuse bladder wall thickening. No intraluminal filling defects.      Impression:  Mild bilateral hydronephrosis. If further evaluation is needed then a CT scan could be performed.

## 2025-07-23 NOTE — PLAN OF CARE
Problem: Discharge Planning  Goal: Discharge to home or other facility with appropriate resources  7/22/2025 2140 by Charley Langley RN  Outcome: Progressing  7/22/2025 1741 by Yaneli Moncada LPN  Outcome: Progressing     Problem: ABCDS Injury Assessment  Goal: Absence of physical injury  7/22/2025 2140 by Charley Langley RN  Outcome: Progressing  7/22/2025 1741 by Yaneli Moncada LPN  Outcome: Progressing     Problem: Safety - Adult  Goal: Free from fall injury  7/22/2025 2140 by Charley Langley RN  Outcome: Progressing  7/22/2025 1741 by Yaneli Moncada LPN  Outcome: Progressing     Problem: Pain  Goal: Verbalizes/displays adequate comfort level or baseline comfort level  7/22/2025 2140 by Charley Langley RN  Outcome: Progressing  7/22/2025 1741 by Yaneli Moncada LPN  Outcome: Progressing     Problem: Genitourinary - Adult  Goal: Absence of urinary retention  7/22/2025 2140 by Charley Langley RN  Outcome: Progressing  7/22/2025 1741 by Yaneli Moncada LPN  Outcome: Progressing     Problem: Metabolic/Fluid and Electrolytes - Adult  Goal: Electrolytes maintained within normal limits  7/22/2025 2140 by Charley Langley RN  Outcome: Progressing  7/22/2025 1741 by Yaneli Moncada LPN  Outcome: Progressing  Goal: Glucose maintained within prescribed range  7/22/2025 2140 by Charley Langley RN  Outcome: Progressing  7/22/2025 1741 by Yaneli Moncada LPN  Outcome: Progressing

## 2025-07-23 NOTE — CARE COORDINATION
ROSMERY will initiate HD set-up after permcath placement tomorrow.  Electronically signed by YANDEL Womack on 7/23/2025 at 1:47 PM

## 2025-07-24 ENCOUNTER — APPOINTMENT (OUTPATIENT)
Dept: INTERVENTIONAL RADIOLOGY/VASCULAR | Age: 60
DRG: 674 | End: 2025-07-24
Attending: HOSPITALIST
Payer: MEDICAID

## 2025-07-24 LAB
ALBUMIN SERPL-MCNC: 2.6 G/DL (ref 3.5–5.2)
ALP SERPL-CCNC: 87 U/L (ref 40–129)
ALT SERPL-CCNC: 18 U/L (ref 10–50)
ANION GAP SERPL CALCULATED.3IONS-SCNC: 10 MMOL/L (ref 8–16)
AST SERPL-CCNC: 16 U/L (ref 10–50)
BASOPHILS # BLD: 0.1 K/UL (ref 0–0.2)
BASOPHILS NFR BLD: 0.9 % (ref 0–1)
BILIRUB SERPL-MCNC: <0.2 MG/DL (ref 0.2–1.2)
BUN SERPL-MCNC: 65 MG/DL (ref 8–23)
CALCIUM SERPL-MCNC: 8.6 MG/DL (ref 8.8–10.2)
CHLORIDE SERPL-SCNC: 111 MMOL/L (ref 98–107)
CO2 SERPL-SCNC: 18 MMOL/L (ref 22–29)
CREAT SERPL-MCNC: 6.8 MG/DL (ref 0.7–1.2)
EOSINOPHIL # BLD: 0.3 K/UL (ref 0–0.6)
EOSINOPHIL NFR BLD: 3 % (ref 0–5)
ERYTHROCYTE [DISTWIDTH] IN BLOOD BY AUTOMATED COUNT: 12.6 % (ref 11.5–14.5)
GLUCOSE SERPL-MCNC: 102 MG/DL (ref 70–99)
HCT VFR BLD AUTO: 30.7 % (ref 42–52)
HGB BLD-MCNC: 9.8 G/DL (ref 14–18)
IMM GRANULOCYTES # BLD: 0.1 K/UL
LYMPHOCYTES # BLD: 2.9 K/UL (ref 1.1–4.5)
LYMPHOCYTES NFR BLD: 29.9 % (ref 20–40)
MCH RBC QN AUTO: 29.9 PG (ref 27–31)
MCHC RBC AUTO-ENTMCNC: 31.9 G/DL (ref 33–37)
MCV RBC AUTO: 93.6 FL (ref 80–94)
MONOCYTES # BLD: 0.9 K/UL (ref 0–0.9)
MONOCYTES NFR BLD: 9.3 % (ref 0–10)
NEUTROPHILS # BLD: 5.4 K/UL (ref 1.5–7.5)
NEUTS SEG NFR BLD: 56.3 % (ref 50–65)
PLATELET # BLD AUTO: 211 K/UL (ref 130–400)
PMV BLD AUTO: 10.7 FL (ref 9.4–12.4)
POTASSIUM SERPL-SCNC: 4.3 MMOL/L (ref 3.5–5.1)
PROT SERPL-MCNC: 5.3 G/DL (ref 6.4–8.3)
RBC # BLD AUTO: 3.28 M/UL (ref 4.7–6.1)
SODIUM SERPL-SCNC: 139 MMOL/L (ref 136–145)
WBC # BLD AUTO: 9.6 K/UL (ref 4.8–10.8)

## 2025-07-24 PROCEDURE — 76937 US GUIDE VASCULAR ACCESS: CPT

## 2025-07-24 PROCEDURE — 6370000000 HC RX 637 (ALT 250 FOR IP): Performed by: HOSPITALIST

## 2025-07-24 PROCEDURE — 76937 US GUIDE VASCULAR ACCESS: CPT | Performed by: SURGERY

## 2025-07-24 PROCEDURE — 6370000000 HC RX 637 (ALT 250 FOR IP): Performed by: SURGERY

## 2025-07-24 PROCEDURE — 2500000003 HC RX 250 WO HCPCS: Performed by: SURGERY

## 2025-07-24 PROCEDURE — 5A1D70Z PERFORMANCE OF URINARY FILTRATION, INTERMITTENT, LESS THAN 6 HOURS PER DAY: ICD-10-PCS | Performed by: SURGERY

## 2025-07-24 PROCEDURE — 8010000000 HC HEMODIALYSIS ACUTE INPT

## 2025-07-24 PROCEDURE — 85025 COMPLETE CBC W/AUTO DIFF WBC: CPT

## 2025-07-24 PROCEDURE — 1200000000 HC SEMI PRIVATE

## 2025-07-24 PROCEDURE — 94760 N-INVAS EAR/PLS OXIMETRY 1: CPT

## 2025-07-24 PROCEDURE — 2500000003 HC RX 250 WO HCPCS: Performed by: HOSPITALIST

## 2025-07-24 PROCEDURE — 77001 FLUOROGUIDE FOR VEIN DEVICE: CPT | Performed by: SURGERY

## 2025-07-24 PROCEDURE — 6360000002 HC RX W HCPCS: Performed by: INTERNAL MEDICINE

## 2025-07-24 PROCEDURE — 36415 COLL VENOUS BLD VENIPUNCTURE: CPT

## 2025-07-24 PROCEDURE — 0JH63XZ INSERTION OF TUNNELED VASCULAR ACCESS DEVICE INTO CHEST SUBCUTANEOUS TISSUE AND FASCIA, PERCUTANEOUS APPROACH: ICD-10-PCS | Performed by: SURGERY

## 2025-07-24 PROCEDURE — 36558 INSERT TUNNELED CV CATH: CPT

## 2025-07-24 PROCEDURE — 6360000002 HC RX W HCPCS: Performed by: SURGERY

## 2025-07-24 PROCEDURE — 77001 FLUOROGUIDE FOR VEIN DEVICE: CPT

## 2025-07-24 PROCEDURE — C1769 GUIDE WIRE: HCPCS

## 2025-07-24 PROCEDURE — 36558 INSERT TUNNELED CV CATH: CPT | Performed by: SURGERY

## 2025-07-24 PROCEDURE — 99152 MOD SED SAME PHYS/QHP 5/>YRS: CPT

## 2025-07-24 PROCEDURE — 80053 COMPREHEN METABOLIC PANEL: CPT

## 2025-07-24 PROCEDURE — 99152 MOD SED SAME PHYS/QHP 5/>YRS: CPT | Performed by: SURGERY

## 2025-07-24 PROCEDURE — 02HV33Z INSERTION OF INFUSION DEVICE INTO SUPERIOR VENA CAVA, PERCUTANEOUS APPROACH: ICD-10-PCS | Performed by: SURGERY

## 2025-07-24 RX ORDER — SODIUM CHLORIDE 0.9 % (FLUSH) 0.9 %
5-40 SYRINGE (ML) INJECTION EVERY 12 HOURS SCHEDULED
Status: DISCONTINUED | OUTPATIENT
Start: 2025-07-24 | End: 2025-07-28 | Stop reason: HOSPADM

## 2025-07-24 RX ORDER — HEPARIN SODIUM 5000 [USP'U]/ML
INJECTION, SOLUTION INTRAVENOUS; SUBCUTANEOUS PRN
Status: COMPLETED | OUTPATIENT
Start: 2025-07-24 | End: 2025-07-24

## 2025-07-24 RX ORDER — MIDAZOLAM HYDROCHLORIDE 1 MG/ML
INJECTION, SOLUTION INTRAMUSCULAR; INTRAVENOUS PRN
Status: COMPLETED | OUTPATIENT
Start: 2025-07-24 | End: 2025-07-24

## 2025-07-24 RX ORDER — SODIUM CHLORIDE 0.9 % (FLUSH) 0.9 %
5-40 SYRINGE (ML) INJECTION PRN
Status: DISCONTINUED | OUTPATIENT
Start: 2025-07-24 | End: 2025-07-28 | Stop reason: HOSPADM

## 2025-07-24 RX ORDER — LIDOCAINE HYDROCHLORIDE AND EPINEPHRINE BITARTRATE 20; .01 MG/ML; MG/ML
INJECTION, SOLUTION SUBCUTANEOUS PRN
Status: COMPLETED | OUTPATIENT
Start: 2025-07-24 | End: 2025-07-24

## 2025-07-24 RX ORDER — FENTANYL CITRATE 50 UG/ML
INJECTION, SOLUTION INTRAMUSCULAR; INTRAVENOUS PRN
Status: COMPLETED | OUTPATIENT
Start: 2025-07-24 | End: 2025-07-24

## 2025-07-24 RX ORDER — SODIUM CHLORIDE 9 MG/ML
INJECTION, SOLUTION INTRAVENOUS PRN
Status: DISCONTINUED | OUTPATIENT
Start: 2025-07-24 | End: 2025-07-28 | Stop reason: HOSPADM

## 2025-07-24 RX ADMIN — HEPARIN SODIUM 5000 UNITS: 5000 INJECTION INTRAVENOUS; SUBCUTANEOUS at 12:36

## 2025-07-24 RX ADMIN — HEPARIN SODIUM 5000 UNITS: 5000 INJECTION INTRAVENOUS; SUBCUTANEOUS at 21:19

## 2025-07-24 RX ADMIN — SODIUM CHLORIDE, PRESERVATIVE FREE 10 ML: 5 INJECTION INTRAVENOUS at 09:48

## 2025-07-24 RX ADMIN — HYDROCODONE BITARTRATE AND ACETAMINOPHEN 1 TABLET: 10; 325 TABLET ORAL at 11:03

## 2025-07-24 RX ADMIN — CALCITRIOL CAPSULES 0.25 MCG 0.25 MCG: 0.25 CAPSULE ORAL at 17:12

## 2025-07-24 RX ADMIN — MIDAZOLAM 1 MG: 1 INJECTION INTRAMUSCULAR; INTRAVENOUS at 12:35

## 2025-07-24 RX ADMIN — TAMSULOSIN HYDROCHLORIDE 0.4 MG: 0.4 CAPSULE ORAL at 21:19

## 2025-07-24 RX ADMIN — HYDROCODONE BITARTRATE AND ACETAMINOPHEN 1 TABLET: 10; 325 TABLET ORAL at 17:12

## 2025-07-24 RX ADMIN — MUPIROCIN: 20 OINTMENT TOPICAL at 21:19

## 2025-07-24 RX ADMIN — FENTANYL CITRATE 25 MCG: 50 INJECTION, SOLUTION INTRAMUSCULAR; INTRAVENOUS at 12:49

## 2025-07-24 RX ADMIN — FENTANYL CITRATE 25 MCG: 50 INJECTION, SOLUTION INTRAMUSCULAR; INTRAVENOUS at 12:46

## 2025-07-24 RX ADMIN — MUPIROCIN: 20 OINTMENT TOPICAL at 10:59

## 2025-07-24 RX ADMIN — SODIUM CHLORIDE, PRESERVATIVE FREE 10 ML: 5 INJECTION INTRAVENOUS at 21:19

## 2025-07-24 RX ADMIN — ATORVASTATIN CALCIUM 40 MG: 40 TABLET, FILM COATED ORAL at 21:19

## 2025-07-24 RX ADMIN — HEPARIN SODIUM 5000 UNITS: 5000 INJECTION INTRAVENOUS; SUBCUTANEOUS at 17:12

## 2025-07-24 RX ADMIN — FENTANYL CITRATE 25 MCG: 50 INJECTION, SOLUTION INTRAMUSCULAR; INTRAVENOUS at 12:35

## 2025-07-24 RX ADMIN — HEPARIN SODIUM 3600 UNITS: 1000 INJECTION INTRAVENOUS; SUBCUTANEOUS at 16:48

## 2025-07-24 RX ADMIN — FINASTERIDE 5 MG: 5 TABLET, FILM COATED ORAL at 17:12

## 2025-07-24 RX ADMIN — CEFAZOLIN 1000 MG: 1 INJECTION, POWDER, FOR SOLUTION INTRAMUSCULAR; INTRAVENOUS at 12:11

## 2025-07-24 RX ADMIN — LIDOCAINE HYDROCHLORIDE AND EPINEPHRINE 10 ML: 20; 10 INJECTION, SOLUTION INFILTRATION; PERINEURAL at 12:36

## 2025-07-24 ASSESSMENT — PAIN SCALES - GENERAL
PAINLEVEL_OUTOF10: 5
PAINLEVEL_OUTOF10: 0
PAINLEVEL_OUTOF10: 8
PAINLEVEL_OUTOF10: 7

## 2025-07-24 ASSESSMENT — PAIN - FUNCTIONAL ASSESSMENT
PAIN_FUNCTIONAL_ASSESSMENT: ACTIVITIES ARE NOT PREVENTED
PAIN_FUNCTIONAL_ASSESSMENT: ACTIVITIES ARE NOT PREVENTED

## 2025-07-24 ASSESSMENT — PAIN DESCRIPTION - ORIENTATION
ORIENTATION: LOWER;LEFT
ORIENTATION: LOWER

## 2025-07-24 ASSESSMENT — PAIN DESCRIPTION - DESCRIPTORS
DESCRIPTORS: ACHING;DISCOMFORT
DESCRIPTORS: ACHING;TENDER;SORE

## 2025-07-24 ASSESSMENT — PAIN DESCRIPTION - LOCATION
LOCATION: BACK;CHEST
LOCATION: BACK

## 2025-07-24 NOTE — OP NOTE
Operative Note      Patient: Tyler Franks  YOB: 1965  MRN: [id number]      7/24/2025    Preoperative Diagnosis:    1. ESRD requiring hemodialysis     Postoperative Diagnosis:  Same    Operative Procedure:    1.  Ultrasound guided cannulation of left internal jugular vein   2.  Placement of left internal jugular vein tunneled dialysis catheter (Bard glidepath 23 cm tip to cuff).     Surgeon:  Radha Tapia DO    Anesthesia: local with moderate sedation  Moderate sedation ASA class III  Timing start: 12:35  End time:12:57  Given   1 Versed IV, in divided doses  75 Fentanyl IV, in divided dose      EBL:  Less than 50 ml      Implants:  Implant Name Type Inv. Item Serial No.  Lot No. LRB No. Used Action   Glidepath Hemodialysis catheter   [id number] BARD ACCESS SYSTEM_COV [id number] Left 1 Implanted         Drains:   [REMOVED] Urinary Catheter 07/19/25 2 Way (Removed)   $ Urethral catheter insertion $ Not inserted for procedure 07/21/25 0949   Catheter Indications Urinary retention (acute or chronic), continuous bladder irrigation or bladder outlet obstruction 07/22/25 0938   Site Assessment No urethral drainage 07/22/25 0938   Urine Color Yellow 07/22/25 0938   Urine Appearance Clear 07/22/25 0938   Urine Odor Malodorous 07/22/25 0938   Collection Container Standard 07/22/25 0938   Securement Method Securing device (Describe) 07/22/25 0938   Catheter Care  Soap and water 07/22/25 0009   Catheter Best Practices  Drainage tube clipped to bed;Catheter secured to thigh;Tamper seal intact;Bag below bladder;Bag not on floor;Lack of dependent loop in tubing;Drainage bag less than half full 07/22/25 0938   Status Draining 07/22/25 0938   Output (mL) 800 mL 07/22/25 1147     Findings:  1.  The left internal jugular vein is patent.  2.  The dialysis catheter tips are in the right atrium/superior vena cava junction.    Procedure in Detail:    After the patient was consented, and given intravenous

## 2025-07-24 NOTE — PLAN OF CARE
Problem: Discharge Planning  Goal: Discharge to home or other facility with appropriate resources  Outcome: Progressing  Flowsheets (Taken 7/24/2025 1059)  Discharge to home or other facility with appropriate resources:   Arrange for needed discharge resources and transportation as appropriate   Identify barriers to discharge with patient and caregiver   Identify discharge learning needs (meds, wound care, etc)   Arrange for interpreters to assist at discharge as needed   Refer to discharge planning if patient needs post-hospital services based on physician order or complex needs related to functional status, cognitive ability or social support system     Problem: ABCDS Injury Assessment  Goal: Absence of physical injury  Outcome: Progressing     Problem: Safety - Adult  Goal: Free from fall injury  Outcome: Progressing     Problem: Pain  Goal: Verbalizes/displays adequate comfort level or baseline comfort level  Outcome: Progressing     Problem: Genitourinary - Adult  Goal: Absence of urinary retention  Outcome: Progressing  Flowsheets (Taken 7/24/2025 1059)  Absence of urinary retention:   Assess patient’s ability to void and empty bladder   Monitor intake/output and perform bladder scan as needed   Place urinary catheter per Licensed Independent Practitioner order if needed   Discuss with Licensed Independent Practitioner  medications to alleviate retention as needed   Discuss catheterization for long term situations as appropriate     Problem: Metabolic/Fluid and Electrolytes - Adult  Goal: Electrolytes maintained within normal limits  Outcome: Progressing  Flowsheets (Taken 7/24/2025 1059)  Electrolytes maintained within normal limits:   Monitor labs and assess patient for signs and symptoms of electrolyte imbalances   Administer electrolyte replacement as ordered   Fluid restriction as ordered   Monitor response to electrolyte replacements, including repeat lab results as appropriate   Instruct patient on

## 2025-07-24 NOTE — DIALYSIS
[facility] INPATIENT SERVICES  DIALYSIS TREATMENT SUMMARY      Note: Consult with the attending physician for patient treatment orders, this document is not a physician order.      Patient Information   Patient Tyler Franks   Date of Birth November 22, 1965   Chart Number [id number]   Location [facility]   Location MRN [id number]   Gender Male   SSN (last 4)      Treatment Information   Treatment Type Hemodialysis   Treatment Id [id number]   Start Time July 24, 2025 13:36   End Time July 24, 2025 16:37   Acutal Duration 03:01     Treatment Balances   Total Saline Administered 500   Net Fluid Balance -1000    Hemodialysis Orders   Therapy Standard   Orders Verified Time 07/24/2025 07:00    Date Verified 07/24/2025   Duration 3:00   Isolated UF/Bypass No   BFR (mL) 300   DFR (mL) 600   Dialyzer Type OPTIFLUX 160NR   UF Order UF Goal Ordered   UF Goal Ordered (mL) 1000   Crit-Line used No   Heparin Initial Units Bolus No   Heparin IV Maintenance Bolus No   Heparin IV Infusion No   Potassium (mEq/L) 2.0   Calcium (mEq/L) 2.5   Bicarb (mg/dL) 35   Sodium (mEq/L) 138   Clinician Daija Haynes RN    AV Access   Access Type Central Venous Catheter   Central Venous Catheter   Access Type Catheter - Tunneled   Date Central Venous Catheter Placed 07/24/2025   Access Location Chest Wall - L   Current/New [facility] Patient Yes   Surgeon Dr Regina MD   1st Use Catheter Verified by MD Order   Catheter Care Completed per Policy Yes   Dressing Dry and Intact on Arrival Yes   Dressing Changed No   Type of Dressing Film Biopatch/CHG   Last Date Changed 07/24/2025   If No select Reason Dressing Change Not Indicated per Policy   Type of HD Caps Curos   HD Caps Changed Yes   CVC Line Education Provided Yes - Dressing Change Frequency      Vitals   Pre-Treatment Vitals   Time Is BP being recorded? Pre BP Map BP Method Pulse RR Temp How was Weight Obtained? Pre Weight Previous Dry Weight Previous Post Weight

## 2025-07-25 LAB
ANION GAP SERPL CALCULATED.3IONS-SCNC: 13 MMOL/L (ref 8–16)
BASOPHILS # BLD: 0.1 K/UL (ref 0–0.2)
BASOPHILS NFR BLD: 0.9 % (ref 0–1)
BUN SERPL-MCNC: 38 MG/DL (ref 8–23)
CALCIUM SERPL-MCNC: 8.7 MG/DL (ref 8.8–10.2)
CHLORIDE SERPL-SCNC: 104 MMOL/L (ref 98–107)
CO2 SERPL-SCNC: 22 MMOL/L (ref 22–29)
CREAT SERPL-MCNC: 5.1 MG/DL (ref 0.7–1.2)
EOSINOPHIL # BLD: 0.2 K/UL (ref 0–0.6)
EOSINOPHIL NFR BLD: 2.2 % (ref 0–5)
ERYTHROCYTE [DISTWIDTH] IN BLOOD BY AUTOMATED COUNT: 12.5 % (ref 11.5–14.5)
GLUCOSE SERPL-MCNC: 101 MG/DL (ref 70–99)
HCT VFR BLD AUTO: 33.2 % (ref 42–52)
HGB BLD-MCNC: 11.4 G/DL (ref 14–18)
IMM GRANULOCYTES # BLD: 0.1 K/UL
LYMPHOCYTES # BLD: 2.6 K/UL (ref 1.1–4.5)
LYMPHOCYTES NFR BLD: 24.3 % (ref 20–40)
MCH RBC QN AUTO: 31.5 PG (ref 27–31)
MCHC RBC AUTO-ENTMCNC: 34.3 G/DL (ref 33–37)
MCV RBC AUTO: 91.7 FL (ref 80–94)
MONOCYTES # BLD: 1.2 K/UL (ref 0–0.9)
MONOCYTES NFR BLD: 10.8 % (ref 0–10)
NEUTROPHILS # BLD: 6.6 K/UL (ref 1.5–7.5)
NEUTS SEG NFR BLD: 61.2 % (ref 50–65)
PLATELET # BLD AUTO: 229 K/UL (ref 130–400)
PMV BLD AUTO: 10.5 FL (ref 9.4–12.4)
POTASSIUM SERPL-SCNC: 4.2 MMOL/L (ref 3.5–5)
RBC # BLD AUTO: 3.62 M/UL (ref 4.7–6.1)
SODIUM SERPL-SCNC: 139 MMOL/L (ref 136–145)
WBC # BLD AUTO: 10.9 K/UL (ref 4.8–10.8)

## 2025-07-25 PROCEDURE — 1200000000 HC SEMI PRIVATE

## 2025-07-25 PROCEDURE — 6370000000 HC RX 637 (ALT 250 FOR IP): Performed by: INTERNAL MEDICINE

## 2025-07-25 PROCEDURE — 94760 N-INVAS EAR/PLS OXIMETRY 1: CPT

## 2025-07-25 PROCEDURE — 85025 COMPLETE CBC W/AUTO DIFF WBC: CPT

## 2025-07-25 PROCEDURE — 6360000002 HC RX W HCPCS: Performed by: SURGERY

## 2025-07-25 PROCEDURE — 8010000000 HC HEMODIALYSIS ACUTE INPT

## 2025-07-25 PROCEDURE — 6370000000 HC RX 637 (ALT 250 FOR IP): Performed by: SURGERY

## 2025-07-25 PROCEDURE — 36415 COLL VENOUS BLD VENIPUNCTURE: CPT

## 2025-07-25 PROCEDURE — 2500000003 HC RX 250 WO HCPCS: Performed by: SURGERY

## 2025-07-25 PROCEDURE — 80048 BASIC METABOLIC PNL TOTAL CA: CPT

## 2025-07-25 PROCEDURE — 51798 US URINE CAPACITY MEASURE: CPT

## 2025-07-25 RX ORDER — AMLODIPINE BESYLATE 10 MG/1
10 TABLET ORAL DAILY
Status: DISCONTINUED | OUTPATIENT
Start: 2025-07-25 | End: 2025-07-28 | Stop reason: HOSPADM

## 2025-07-25 RX ADMIN — FINASTERIDE 5 MG: 5 TABLET, FILM COATED ORAL at 13:14

## 2025-07-25 RX ADMIN — SODIUM CHLORIDE, PRESERVATIVE FREE 10 ML: 5 INJECTION INTRAVENOUS at 21:08

## 2025-07-25 RX ADMIN — SODIUM CHLORIDE, PRESERVATIVE FREE 10 ML: 5 INJECTION INTRAVENOUS at 13:16

## 2025-07-25 RX ADMIN — HEPARIN SODIUM 5000 UNITS: 5000 INJECTION INTRAVENOUS; SUBCUTANEOUS at 13:16

## 2025-07-25 RX ADMIN — HYDROCODONE BITARTRATE AND ACETAMINOPHEN 1 TABLET: 10; 325 TABLET ORAL at 00:21

## 2025-07-25 RX ADMIN — HEPARIN SODIUM 5000 UNITS: 5000 INJECTION INTRAVENOUS; SUBCUTANEOUS at 19:45

## 2025-07-25 RX ADMIN — ONDANSETRON 4 MG: 2 INJECTION, SOLUTION INTRAMUSCULAR; INTRAVENOUS at 20:13

## 2025-07-25 RX ADMIN — ATORVASTATIN CALCIUM 40 MG: 40 TABLET, FILM COATED ORAL at 19:45

## 2025-07-25 RX ADMIN — CALCITRIOL CAPSULES 0.25 MCG 0.25 MCG: 0.25 CAPSULE ORAL at 13:14

## 2025-07-25 RX ADMIN — MUPIROCIN: 20 OINTMENT TOPICAL at 13:16

## 2025-07-25 RX ADMIN — HYDROCODONE BITARTRATE AND ACETAMINOPHEN 1 TABLET: 10; 325 TABLET ORAL at 13:19

## 2025-07-25 RX ADMIN — MUPIROCIN: 20 OINTMENT TOPICAL at 19:45

## 2025-07-25 RX ADMIN — HEPARIN SODIUM 5000 UNITS: 5000 INJECTION INTRAVENOUS; SUBCUTANEOUS at 04:07

## 2025-07-25 RX ADMIN — TAMSULOSIN HYDROCHLORIDE 0.4 MG: 0.4 CAPSULE ORAL at 19:45

## 2025-07-25 RX ADMIN — AMLODIPINE BESYLATE 10 MG: 10 TABLET ORAL at 13:14

## 2025-07-25 ASSESSMENT — PAIN DESCRIPTION - DESCRIPTORS
DESCRIPTORS: ACHING;DISCOMFORT
DESCRIPTORS: THROBBING

## 2025-07-25 ASSESSMENT — PAIN DESCRIPTION - LOCATION
LOCATION: CHEST
LOCATION: BACK

## 2025-07-25 ASSESSMENT — PAIN SCALES - GENERAL
PAINLEVEL_OUTOF10: 7
PAINLEVEL_OUTOF10: 2
PAINLEVEL_OUTOF10: 8

## 2025-07-25 ASSESSMENT — PAIN DESCRIPTION - ORIENTATION
ORIENTATION: LOWER
ORIENTATION: LEFT

## 2025-07-25 NOTE — PLAN OF CARE
Problem: Discharge Planning  Goal: Discharge to home or other facility with appropriate resources  7/24/2025 2359 by Chrystal Vela, RN  Outcome: Progressing  7/24/2025 1539 by Jayne Stephens LPN  Outcome: Progressing  Flowsheets (Taken 7/24/2025 1059)  Discharge to home or other facility with appropriate resources:   Arrange for needed discharge resources and transportation as appropriate   Identify barriers to discharge with patient and caregiver   Identify discharge learning needs (meds, wound care, etc)   Arrange for interpreters to assist at discharge as needed   Refer to discharge planning if patient needs post-hospital services based on physician order or complex needs related to functional status, cognitive ability or social support system     Problem: ABCDS Injury Assessment  Goal: Absence of physical injury  7/24/2025 2359 by Chrystal Vela, RN  Outcome: Progressing  7/24/2025 1539 by Jayne Stephens LPN  Outcome: Progressing     Problem: Safety - Adult  Goal: Free from fall injury  7/24/2025 2359 by Chrystal Vela, RN  Outcome: Progressing  7/24/2025 1539 by Jayne Stephens LPN  Outcome: Progressing     Problem: Pain  Goal: Verbalizes/displays adequate comfort level or baseline comfort level  7/24/2025 2359 by Chrystal Vela RN  Outcome: Progressing  7/24/2025 1539 by Jayne Stephens LPN  Outcome: Progressing     Problem: Genitourinary - Adult  Goal: Absence of urinary retention  7/24/2025 2359 by Chrystal Vela, RN  Outcome: Progressing  7/24/2025 1539 by Jayne Stephens LPN  Outcome: Progressing  Flowsheets (Taken 7/24/2025 1059)  Absence of urinary retention:   Assess patient’s ability to void and empty bladder   Monitor intake/output and perform bladder scan as needed   Place urinary catheter per Licensed Independent Practitioner order if needed   Discuss with Licensed Independent Practitioner  medications to alleviate retention as needed

## 2025-07-25 NOTE — CARE COORDINATION
Referral pending with [facility] for HD chair time.  [facility] Central Intake (dialysis)  [phone]  [phone]  Electronically signed by YANDEL Womack on 7/25/2025 at 12:32 PM

## 2025-07-25 NOTE — DIALYSIS
[facility] INPATIENT SERVICES  DIALYSIS TREATMENT SUMMARY      Note: Consult with the attending physician for patient treatment orders, this document is not a physician order.      Patient Information   Patient Tyler Franks   Date of Birth November 22, 1965   Chart Number [id number]   Location [facility]   Location MRN [id number]   Gender Male   SSN (last 4)      Treatment Information   Treatment Type Hemodialysis   Treatment Id [id number]   Start Time July 25, 2025 08:51   End Time July 25, 2025 12:22   Acutal Duration 03:31     Treatment Balances   Total Saline Administered 500   Net Fluid Balance -1000    Hemodialysis Orders   Therapy Standard   Orders Verified Time 07/25/2025 08:30    Date Verified 07/25/2025   Duration 3:30   Isolated UF/Bypass No   BFR (mL) 350   DFR (mL) 500   Dialyzer Type OPTIFLUX 160NR   UF Order UF Goal Ordered   UF Goal Ordered (mL) 1000   With BP Parameters Yes   As Tolerated Yes   Crit-Line used No   Heparin Initial Units Bolus No   Heparin IV Maintenance Bolus No   Heparin IV Infusion No   Potassium (mEq/L) 3   Calcium (mEq/L) 2.5   Bicarb (mg/dL) 35   Sodium (mEq/L) 138   Clinician Cedric Soto, CRIS    AV Access   Access Type Central Venous Catheter   Central Venous Catheter   Access Type Catheter - Tunneled   Date Central Venous Catheter Placed 07/24/2025   Access Location Chest Wall - L   Current/New [facility] Patient Yes   Surgeon Dr Regina MD   1st Use Catheter Verified by MD Order   Catheter Care Completed per Policy Yes   Dressing Dry and Intact on Arrival Yes   Dressing Changed Yes   Type of Dressing Film Biopatch/CHG   Dressing Changed By [facility] Staff   Type of HD Caps Curos   HD Caps Changed Yes   CVC Line Education Provided Yes - Infection Prevention      Vitals   Pre-Treatment Vitals   Time Is BP being recorded? Pre BP Map BP Method Pulse RR Temp How was Weight Obtained? Pre Weight Previous Dry Weight Previous Post Weight Metric Target Fluid

## 2025-07-25 NOTE — CARE COORDINATION
Tentative chair received; awaiting financial/medical clearance; [facility] MWF 0715; first outpt run Wed 7/30/25  Electronically signed by YANDEL Womack on 7/25/2025 at 2:41 PM

## 2025-07-25 NOTE — PLAN OF CARE
Problem: Discharge Planning  Goal: Discharge to home or other facility with appropriate resources  7/25/2025 1322 by Elina Watson RN  Outcome: Progressing  7/24/2025 2359 by Chrystal Vela RN  Outcome: Progressing     Problem: ABCDS Injury Assessment  Goal: Absence of physical injury  7/25/2025 1322 by Elina Watson RN  Outcome: Progressing  7/24/2025 2359 by Chrystal Vela RN  Outcome: Progressing     Problem: Safety - Adult  Goal: Free from fall injury  7/25/2025 1322 by Elina Watson RN  Outcome: Progressing  7/24/2025 2359 by Chrystal Vela RN  Outcome: Progressing     Problem: Pain  Goal: Verbalizes/displays adequate comfort level or baseline comfort level  7/25/2025 1322 by Elina Watson RN  Outcome: Progressing  7/24/2025 2359 by Chrystal Vela RN  Outcome: Progressing     Problem: Genitourinary - Adult  Goal: Absence of urinary retention  7/25/2025 1322 by Elina Watson RN  Outcome: Progressing  7/24/2025 2359 by Chrystal Vela RN  Outcome: Progressing     Problem: Metabolic/Fluid and Electrolytes - Adult  Goal: Electrolytes maintained within normal limits  7/25/2025 1322 by Elina Watson RN  Outcome: Progressing  7/24/2025 2359 by Chrystal Vela RN  Outcome: Progressing  Goal: Glucose maintained within prescribed range  7/25/2025 1322 by Elina Watson RN  Outcome: Progressing  7/24/2025 2359 by Chrystal Vela RN  Outcome: Progressing     Problem: Nutrition Deficit:  Goal: Optimize nutritional status  Outcome: Progressing

## 2025-07-26 LAB
ANION GAP SERPL CALCULATED.3IONS-SCNC: 12 MMOL/L (ref 8–16)
BASOPHILS # BLD: 0.1 K/UL (ref 0–0.2)
BASOPHILS NFR BLD: 0.5 % (ref 0–1)
BUN SERPL-MCNC: 32 MG/DL (ref 8–23)
CALCIUM SERPL-MCNC: 8.9 MG/DL (ref 8.8–10.2)
CHLORIDE SERPL-SCNC: 99 MMOL/L (ref 98–107)
CO2 SERPL-SCNC: 26 MMOL/L (ref 22–29)
CREAT SERPL-MCNC: 4.9 MG/DL (ref 0.7–1.2)
EOSINOPHIL # BLD: 0.2 K/UL (ref 0–0.6)
EOSINOPHIL NFR BLD: 1.4 % (ref 0–5)
ERYTHROCYTE [DISTWIDTH] IN BLOOD BY AUTOMATED COUNT: 12.5 % (ref 11.5–14.5)
GLUCOSE BLD-MCNC: 91 MG/DL (ref 70–99)
GLUCOSE SERPL-MCNC: 113 MG/DL (ref 70–99)
HCT VFR BLD AUTO: 35.5 % (ref 42–52)
HGB BLD-MCNC: 11.5 G/DL (ref 14–18)
IMM GRANULOCYTES # BLD: 0.1 K/UL
LYMPHOCYTES # BLD: 2.6 K/UL (ref 1.1–4.5)
LYMPHOCYTES NFR BLD: 21.7 % (ref 20–40)
MCH RBC QN AUTO: 30.1 PG (ref 27–31)
MCHC RBC AUTO-ENTMCNC: 32.4 G/DL (ref 33–37)
MCV RBC AUTO: 92.9 FL (ref 80–94)
MONOCYTES # BLD: 1.1 K/UL (ref 0–0.9)
MONOCYTES NFR BLD: 9.2 % (ref 0–10)
NEUTROPHILS # BLD: 7.9 K/UL (ref 1.5–7.5)
NEUTS SEG NFR BLD: 66.8 % (ref 50–65)
PERFORMED ON: NORMAL
PLATELET # BLD AUTO: 194 K/UL (ref 130–400)
PMV BLD AUTO: 10 FL (ref 9.4–12.4)
POTASSIUM SERPL-SCNC: 4.3 MMOL/L (ref 3.5–5)
RBC # BLD AUTO: 3.82 M/UL (ref 4.7–6.1)
SODIUM SERPL-SCNC: 137 MMOL/L (ref 136–145)
WBC # BLD AUTO: 11.8 K/UL (ref 4.8–10.8)

## 2025-07-26 PROCEDURE — 82962 GLUCOSE BLOOD TEST: CPT

## 2025-07-26 PROCEDURE — 6370000000 HC RX 637 (ALT 250 FOR IP): Performed by: INTERNAL MEDICINE

## 2025-07-26 PROCEDURE — 6360000002 HC RX W HCPCS: Performed by: INTERNAL MEDICINE

## 2025-07-26 PROCEDURE — 8010000000 HC HEMODIALYSIS ACUTE INPT

## 2025-07-26 PROCEDURE — 80048 BASIC METABOLIC PNL TOTAL CA: CPT

## 2025-07-26 PROCEDURE — 36415 COLL VENOUS BLD VENIPUNCTURE: CPT

## 2025-07-26 PROCEDURE — 2500000003 HC RX 250 WO HCPCS: Performed by: INTERNAL MEDICINE

## 2025-07-26 PROCEDURE — 6370000000 HC RX 637 (ALT 250 FOR IP): Performed by: SURGERY

## 2025-07-26 PROCEDURE — 85025 COMPLETE CBC W/AUTO DIFF WBC: CPT

## 2025-07-26 PROCEDURE — 2500000003 HC RX 250 WO HCPCS: Performed by: SURGERY

## 2025-07-26 PROCEDURE — 94760 N-INVAS EAR/PLS OXIMETRY 1: CPT

## 2025-07-26 PROCEDURE — 6360000002 HC RX W HCPCS: Performed by: SURGERY

## 2025-07-26 PROCEDURE — 1200000000 HC SEMI PRIVATE

## 2025-07-26 RX ADMIN — TAMSULOSIN HYDROCHLORIDE 0.4 MG: 0.4 CAPSULE ORAL at 20:17

## 2025-07-26 RX ADMIN — HYDROCODONE BITARTRATE AND ACETAMINOPHEN 1 TABLET: 10; 325 TABLET ORAL at 12:17

## 2025-07-26 RX ADMIN — HEPARIN SODIUM 5000 UNITS: 5000 INJECTION INTRAVENOUS; SUBCUTANEOUS at 20:18

## 2025-07-26 RX ADMIN — WATER 2 MG: 1 INJECTION INTRAMUSCULAR; INTRAVENOUS; SUBCUTANEOUS at 12:11

## 2025-07-26 RX ADMIN — MUPIROCIN: 20 OINTMENT TOPICAL at 21:00

## 2025-07-26 RX ADMIN — SODIUM CHLORIDE, PRESERVATIVE FREE 10 ML: 5 INJECTION INTRAVENOUS at 20:19

## 2025-07-26 RX ADMIN — CALCITRIOL CAPSULES 0.25 MCG 0.25 MCG: 0.25 CAPSULE ORAL at 12:16

## 2025-07-26 RX ADMIN — ATORVASTATIN CALCIUM 40 MG: 40 TABLET, FILM COATED ORAL at 20:17

## 2025-07-26 RX ADMIN — HEPARIN SODIUM 5000 UNITS: 5000 INJECTION INTRAVENOUS; SUBCUTANEOUS at 14:31

## 2025-07-26 RX ADMIN — WATER 2 MG: 1 INJECTION INTRAMUSCULAR; INTRAVENOUS; SUBCUTANEOUS at 12:10

## 2025-07-26 RX ADMIN — HEPARIN SODIUM 5000 UNITS: 5000 INJECTION INTRAVENOUS; SUBCUTANEOUS at 03:24

## 2025-07-26 RX ADMIN — MUPIROCIN: 20 OINTMENT TOPICAL at 12:18

## 2025-07-26 RX ADMIN — FINASTERIDE 5 MG: 5 TABLET, FILM COATED ORAL at 12:16

## 2025-07-26 RX ADMIN — SODIUM CHLORIDE, PRESERVATIVE FREE 10 ML: 5 INJECTION INTRAVENOUS at 12:17

## 2025-07-26 RX ADMIN — AMLODIPINE BESYLATE 10 MG: 10 TABLET ORAL at 12:16

## 2025-07-26 RX ADMIN — HYDROCODONE BITARTRATE AND ACETAMINOPHEN 1 TABLET: 10; 325 TABLET ORAL at 20:17

## 2025-07-26 ASSESSMENT — PAIN SCALES - WONG BAKER
WONGBAKER_NUMERICALRESPONSE: NO HURT
WONGBAKER_NUMERICALRESPONSE: NO HURT

## 2025-07-26 ASSESSMENT — PAIN DESCRIPTION - DESCRIPTORS
DESCRIPTORS: ACHING;THROBBING
DESCRIPTORS: ACHING;DISCOMFORT

## 2025-07-26 ASSESSMENT — PAIN SCALES - GENERAL
PAINLEVEL_OUTOF10: 8
PAINLEVEL_OUTOF10: 0
PAINLEVEL_OUTOF10: 8

## 2025-07-26 ASSESSMENT — PAIN DESCRIPTION - ORIENTATION
ORIENTATION: LOWER
ORIENTATION: LOWER

## 2025-07-26 ASSESSMENT — PAIN DESCRIPTION - LOCATION
LOCATION: BACK
LOCATION: BACK

## 2025-07-26 NOTE — DIALYSIS
[facility] [place]  DIALYSIS TREATMENT SUMMARY      Note: Consult with the attending physician for patient treatment orders, this document is not a physician order.      Patient Information   Patient Tyler Franks   Date of Birth November 22, 1965   Chart Number [id number]   Location [facility]   Location MRN [id number]   Gender Male   SSN (last 4)      Treatment Information   Treatment Type Hemodialysis   Treatment Id [id number]   Start Time July 26, 2025 08:09   End Time July 26, 2025 11:45   Acutal Duration 03:36     Treatment Balances   Total Saline Administered 500   Net Fluid Balance -1500    Hemodialysis Orders   Therapy Standard   Orders Verified Time 07/26/2025 08:00    Date Verified 07/26/2025   Duration 3:30   Isolated UF/Bypass No   BFR (mL) 400   DFR (mL) 500   Dialyzer Type OPTIFLUX 160NR   UF Order UF Goal Ordered   UF Goal Ordered (mL) 2000   Crit-Line used No   Heparin Initial Units Bolus No   Heparin IV Maintenance Bolus No   Heparin IV Infusion No   Potassium (mEq/L) 3   Calcium (mEq/L) 2.5   Bicarb (mg/dL) 35   Sodium (mEq/L) 138   Clinician Rhina Patel RN    AV Access   Access Type Central Venous Catheter   Central Venous Catheter   Access Type Catheter - Tunneled   Date Central Venous Catheter Placed 07/24/2025   Access Location Chest Wall - L   Current/New [facility] Patient Yes   Surgeon Dr Regina MD   1st Use Catheter Verified by MD Order   Catheter Care Completed per Policy Yes   Dressing Dry and Intact on Arrival Yes   Dressing Changed No   Type of Dressing Film Biopatch/CHG   Last Date Changed 07/25/2025   If No select Reason Dressing Change Not Indicated per Policy   Type of HD Caps Curos   HD Caps Changed Yes   CVC Line Education Provided Yes - Infection Prevention      Vitals   Pre-Treatment Vitals   Time Is BP being recorded? Pre BP Map BP Method Pulse RR Temp How was Weight Obtained? Pre Weight Previous Dry Weight Previous Post Weight

## 2025-07-26 NOTE — PLAN OF CARE
Problem: Discharge Planning  Goal: Discharge to home or other facility with appropriate resources  7/25/2025 2231 by Chrystal Vela, RN  Outcome: Progressing  7/25/2025 1322 by Elina Watson RN  Outcome: Progressing     Problem: ABCDS Injury Assessment  Goal: Absence of physical injury  7/25/2025 2231 by Chrystal Vela, RN  Outcome: Progressing  7/25/2025 1322 by Elina Watson RN  Outcome: Progressing     Problem: Safety - Adult  Goal: Free from fall injury  7/25/2025 2231 by Chrystal Vela, RN  Outcome: Progressing  7/25/2025 1322 by Elina Watson RN  Outcome: Progressing     Problem: Pain  Goal: Verbalizes/displays adequate comfort level or baseline comfort level  7/25/2025 2231 by Chrystal Vela RN  Outcome: Progressing  7/25/2025 1322 by Elina Watson RN  Outcome: Progressing     Problem: Genitourinary - Adult  Goal: Absence of urinary retention  7/25/2025 2231 by Chrystal Vela RN  Outcome: Progressing  7/25/2025 1322 by Elina Watson RN  Outcome: Progressing     Problem: Metabolic/Fluid and Electrolytes - Adult  Goal: Electrolytes maintained within normal limits  7/25/2025 2231 by Chrystal Vela RN  Outcome: Progressing  7/25/2025 1322 by Elina Watson RN  Outcome: Progressing  Goal: Glucose maintained within prescribed range  7/25/2025 2231 by Chrystal Vela, RN  Outcome: Progressing  7/25/2025 1322 by Elina Watson RN  Outcome: Progressing     Problem: Nutrition Deficit:  Goal: Optimize nutritional status  7/25/2025 2231 by Chrystal Vela, RN  Outcome: Progressing  7/25/2025 1322 by Elina Watson RN  Outcome: Progressing

## 2025-07-26 NOTE — PLAN OF CARE
Problem: Discharge Planning  Goal: Discharge to home or other facility with appropriate resources  7/26/2025 0951 by Iveth Seaman RN  Outcome: Progressing  7/25/2025 2231 by Chrystal Vela RN  Outcome: Progressing     Problem: ABCDS Injury Assessment  Goal: Absence of physical injury  7/26/2025 0951 by Iveth Seaman RN  Outcome: Progressing  7/25/2025 2231 by Chrystal Vela RN  Outcome: Progressing     Problem: Safety - Adult  Goal: Free from fall injury  7/26/2025 0951 by Iveth Seaman RN  Outcome: Progressing  7/25/2025 2231 by Chrystal Vela RN  Outcome: Progressing     Problem: Pain  Goal: Verbalizes/displays adequate comfort level or baseline comfort level  7/26/2025 0951 by Iveth Seaman RN  Outcome: Progressing  7/25/2025 2231 by Chrystal Vela RN  Outcome: Progressing     Problem: Genitourinary - Adult  Goal: Absence of urinary retention  7/26/2025 0951 by Iveth Seaman RN  Outcome: Progressing  7/25/2025 2231 by Chrystal Vela RN  Outcome: Progressing     Problem: Metabolic/Fluid and Electrolytes - Adult  Goal: Electrolytes maintained within normal limits  7/26/2025 0951 by Iveth Seaman RN  Outcome: Progressing  7/25/2025 2231 by Chrystal Vela RN  Outcome: Progressing  Goal: Glucose maintained within prescribed range  7/26/2025 0951 by Iveth Seaman RN  Outcome: Progressing  7/25/2025 2231 by Chrystal Vela RN  Outcome: Progressing

## 2025-07-27 LAB
ANION GAP SERPL CALCULATED.3IONS-SCNC: 13 MMOL/L (ref 8–16)
BASOPHILS # BLD: 0.1 K/UL (ref 0–0.2)
BASOPHILS NFR BLD: 0.8 % (ref 0–1)
BUN SERPL-MCNC: 29 MG/DL (ref 8–23)
CALCIUM SERPL-MCNC: 8.9 MG/DL (ref 8.8–10.2)
CHLORIDE SERPL-SCNC: 100 MMOL/L (ref 98–107)
CO2 SERPL-SCNC: 25 MMOL/L (ref 22–29)
CREAT SERPL-MCNC: 5.3 MG/DL (ref 0.7–1.2)
EOSINOPHIL # BLD: 0.3 K/UL (ref 0–0.6)
EOSINOPHIL NFR BLD: 2.2 % (ref 0–5)
ERYTHROCYTE [DISTWIDTH] IN BLOOD BY AUTOMATED COUNT: 12.4 % (ref 11.5–14.5)
GLUCOSE SERPL-MCNC: 106 MG/DL (ref 70–99)
HCT VFR BLD AUTO: 35.4 % (ref 42–52)
HGB BLD-MCNC: 11.3 G/DL (ref 14–18)
IMM GRANULOCYTES # BLD: 0.1 K/UL
LYMPHOCYTES # BLD: 3.3 K/UL (ref 1.1–4.5)
LYMPHOCYTES NFR BLD: 28 % (ref 20–40)
MCH RBC QN AUTO: 30.4 PG (ref 27–31)
MCHC RBC AUTO-ENTMCNC: 31.9 G/DL (ref 33–37)
MCV RBC AUTO: 95.2 FL (ref 80–94)
MONOCYTES # BLD: 1.2 K/UL (ref 0–0.9)
MONOCYTES NFR BLD: 10.1 % (ref 0–10)
NEUTROPHILS # BLD: 7 K/UL (ref 1.5–7.5)
NEUTS SEG NFR BLD: 58.5 % (ref 50–65)
PLATELET # BLD AUTO: 161 K/UL (ref 130–400)
PMV BLD AUTO: 10.8 FL (ref 9.4–12.4)
POTASSIUM SERPL-SCNC: 4.1 MMOL/L (ref 3.5–5)
RBC # BLD AUTO: 3.72 M/UL (ref 4.7–6.1)
SODIUM SERPL-SCNC: 138 MMOL/L (ref 136–145)
WBC # BLD AUTO: 11.9 K/UL (ref 4.8–10.8)

## 2025-07-27 PROCEDURE — 2500000003 HC RX 250 WO HCPCS: Performed by: SURGERY

## 2025-07-27 PROCEDURE — 94760 N-INVAS EAR/PLS OXIMETRY 1: CPT

## 2025-07-27 PROCEDURE — 6360000002 HC RX W HCPCS: Performed by: SURGERY

## 2025-07-27 PROCEDURE — 1200000000 HC SEMI PRIVATE

## 2025-07-27 PROCEDURE — 6370000000 HC RX 637 (ALT 250 FOR IP): Performed by: INTERNAL MEDICINE

## 2025-07-27 PROCEDURE — 85025 COMPLETE CBC W/AUTO DIFF WBC: CPT

## 2025-07-27 PROCEDURE — 6370000000 HC RX 637 (ALT 250 FOR IP): Performed by: SURGERY

## 2025-07-27 PROCEDURE — 36415 COLL VENOUS BLD VENIPUNCTURE: CPT

## 2025-07-27 PROCEDURE — 80048 BASIC METABOLIC PNL TOTAL CA: CPT

## 2025-07-27 RX ADMIN — CALCITRIOL CAPSULES 0.25 MCG 0.25 MCG: 0.25 CAPSULE ORAL at 08:56

## 2025-07-27 RX ADMIN — HYDROCODONE BITARTRATE AND ACETAMINOPHEN 1 TABLET: 10; 325 TABLET ORAL at 20:13

## 2025-07-27 RX ADMIN — HEPARIN SODIUM 5000 UNITS: 5000 INJECTION INTRAVENOUS; SUBCUTANEOUS at 20:13

## 2025-07-27 RX ADMIN — SODIUM CHLORIDE, PRESERVATIVE FREE 10 ML: 5 INJECTION INTRAVENOUS at 08:56

## 2025-07-27 RX ADMIN — FINASTERIDE 5 MG: 5 TABLET, FILM COATED ORAL at 08:56

## 2025-07-27 RX ADMIN — AMLODIPINE BESYLATE 10 MG: 10 TABLET ORAL at 08:56

## 2025-07-27 RX ADMIN — SODIUM CHLORIDE, PRESERVATIVE FREE 10 ML: 5 INJECTION INTRAVENOUS at 20:02

## 2025-07-27 RX ADMIN — HEPARIN SODIUM 5000 UNITS: 5000 INJECTION INTRAVENOUS; SUBCUTANEOUS at 06:09

## 2025-07-27 RX ADMIN — MUPIROCIN: 20 OINTMENT TOPICAL at 20:04

## 2025-07-27 RX ADMIN — MUPIROCIN: 20 OINTMENT TOPICAL at 08:56

## 2025-07-27 RX ADMIN — HEPARIN SODIUM 5000 UNITS: 5000 INJECTION INTRAVENOUS; SUBCUTANEOUS at 14:52

## 2025-07-27 RX ADMIN — TAMSULOSIN HYDROCHLORIDE 0.4 MG: 0.4 CAPSULE ORAL at 20:00

## 2025-07-27 RX ADMIN — ATORVASTATIN CALCIUM 40 MG: 40 TABLET, FILM COATED ORAL at 20:00

## 2025-07-27 ASSESSMENT — PAIN SCALES - GENERAL
PAINLEVEL_OUTOF10: 1
PAINLEVEL_OUTOF10: 8
PAINLEVEL_OUTOF10: 0

## 2025-07-27 ASSESSMENT — PAIN DESCRIPTION - LOCATION: LOCATION: BACK

## 2025-07-27 ASSESSMENT — PAIN DESCRIPTION - ORIENTATION: ORIENTATION: MID;LOWER

## 2025-07-27 ASSESSMENT — PAIN - FUNCTIONAL ASSESSMENT: PAIN_FUNCTIONAL_ASSESSMENT: ACTIVITIES ARE NOT PREVENTED

## 2025-07-27 ASSESSMENT — PAIN DESCRIPTION - DESCRIPTORS: DESCRIPTORS: ACHING

## 2025-07-27 NOTE — PLAN OF CARE
Problem: Discharge Planning  Goal: Discharge to home or other facility with appropriate resources  7/27/2025 1007 by Iveth Seaman RN  Outcome: Progressing  Flowsheets (Taken 7/27/2025 0856)  Discharge to home or other facility with appropriate resources: Identify barriers to discharge with patient and caregiver  7/27/2025 0434 by Alhaji Garza RN  Outcome: Progressing     Problem: ABCDS Injury Assessment  Goal: Absence of physical injury  7/27/2025 1007 by Iveth Seaman RN  Outcome: Progressing  7/27/2025 0434 by Alhaji Garza RN  Outcome: Progressing     Problem: Safety - Adult  Goal: Free from fall injury  7/27/2025 1007 by Iveth Seaman RN  Outcome: Progressing  7/27/2025 0434 by Alhaji Garza RN  Outcome: Progressing     Problem: Pain  Goal: Verbalizes/displays adequate comfort level or baseline comfort level  7/27/2025 1007 by Iveth Seaman RN  Outcome: Progressing  7/27/2025 0434 by Alhaji Garza RN  Outcome: Progressing     Problem: Genitourinary - Adult  Goal: Absence of urinary retention  7/27/2025 1007 by Iveth Seaman RN  Outcome: Progressing  7/27/2025 0434 by Alhaji Garza RN  Outcome: Progressing

## 2025-07-27 NOTE — PLAN OF CARE
Problem: Discharge Planning  Goal: Discharge to home or other facility with appropriate resources  Outcome: Progressing     Problem: ABCDS Injury Assessment  Goal: Absence of physical injury  Outcome: Progressing     Problem: Safety - Adult  Goal: Free from fall injury  Outcome: Progressing     Problem: Pain  Goal: Verbalizes/displays adequate comfort level or baseline comfort level  Outcome: Progressing     Problem: Genitourinary - Adult  Goal: Absence of urinary retention  Outcome: Progressing     Problem: Metabolic/Fluid and Electrolytes - Adult  Goal: Electrolytes maintained within normal limits  Outcome: Progressing  Goal: Glucose maintained within prescribed range  Outcome: Progressing     Problem: Nutrition Deficit:  Goal: Optimize nutritional status  Outcome: Progressing

## 2025-07-28 ENCOUNTER — APPOINTMENT (OUTPATIENT)
Dept: VASCULAR LAB | Age: 60
DRG: 674 | End: 2025-07-28
Attending: SURGERY
Payer: MEDICAID

## 2025-07-28 VITALS
RESPIRATION RATE: 16 BRPM | DIASTOLIC BLOOD PRESSURE: 88 MMHG | HEIGHT: 66 IN | TEMPERATURE: 96.8 F | WEIGHT: 131.7 LBS | HEART RATE: 54 BPM | BODY MASS INDEX: 21.17 KG/M2 | OXYGEN SATURATION: 99 % | SYSTOLIC BLOOD PRESSURE: 159 MMHG

## 2025-07-28 LAB
ANION GAP SERPL CALCULATED.3IONS-SCNC: 14 MMOL/L (ref 8–16)
BASOPHILS # BLD: 0.1 K/UL (ref 0–0.2)
BASOPHILS NFR BLD: 0.8 % (ref 0–1)
BUN SERPL-MCNC: 42 MG/DL (ref 8–23)
CALCIUM SERPL-MCNC: 8.9 MG/DL (ref 8.8–10.2)
CHLORIDE SERPL-SCNC: 100 MMOL/L (ref 98–107)
CO2 SERPL-SCNC: 22 MMOL/L (ref 22–29)
CREAT SERPL-MCNC: 6.9 MG/DL (ref 0.7–1.2)
EOSINOPHIL # BLD: 0.3 K/UL (ref 0–0.6)
EOSINOPHIL NFR BLD: 2.4 % (ref 0–5)
ERYTHROCYTE [DISTWIDTH] IN BLOOD BY AUTOMATED COUNT: 12.2 % (ref 11.5–14.5)
GLUCOSE BLD-MCNC: 123 MG/DL (ref 70–99)
GLUCOSE SERPL-MCNC: 106 MG/DL (ref 70–99)
HCT VFR BLD AUTO: 35.2 % (ref 42–52)
HGB BLD-MCNC: 11.3 G/DL (ref 14–18)
IMM GRANULOCYTES # BLD: 0.1 K/UL
LYMPHOCYTES # BLD: 3 K/UL (ref 1.1–4.5)
LYMPHOCYTES NFR BLD: 25.1 % (ref 20–40)
MCH RBC QN AUTO: 30.2 PG (ref 27–31)
MCHC RBC AUTO-ENTMCNC: 32.1 G/DL (ref 33–37)
MCV RBC AUTO: 94.1 FL (ref 80–94)
MONOCYTES # BLD: 1.3 K/UL (ref 0–0.9)
MONOCYTES NFR BLD: 11 % (ref 0–10)
NEUTROPHILS # BLD: 7.2 K/UL (ref 1.5–7.5)
NEUTS SEG NFR BLD: 60.2 % (ref 50–65)
PERFORMED ON: ABNORMAL
PLATELET # BLD AUTO: 169 K/UL (ref 130–400)
PMV BLD AUTO: 10.7 FL (ref 9.4–12.4)
POTASSIUM SERPL-SCNC: 4.2 MMOL/L (ref 3.5–5)
RBC # BLD AUTO: 3.74 M/UL (ref 4.7–6.1)
SODIUM SERPL-SCNC: 136 MMOL/L (ref 136–145)
WBC # BLD AUTO: 11.9 K/UL (ref 4.8–10.8)

## 2025-07-28 PROCEDURE — 6370000000 HC RX 637 (ALT 250 FOR IP): Performed by: SURGERY

## 2025-07-28 PROCEDURE — 36415 COLL VENOUS BLD VENIPUNCTURE: CPT

## 2025-07-28 PROCEDURE — 82962 GLUCOSE BLOOD TEST: CPT

## 2025-07-28 PROCEDURE — 6370000000 HC RX 637 (ALT 250 FOR IP): Performed by: INTERNAL MEDICINE

## 2025-07-28 PROCEDURE — 8010000000 HC HEMODIALYSIS ACUTE INPT

## 2025-07-28 PROCEDURE — 6360000002 HC RX W HCPCS: Performed by: SURGERY

## 2025-07-28 PROCEDURE — 85025 COMPLETE CBC W/AUTO DIFF WBC: CPT

## 2025-07-28 PROCEDURE — 2500000003 HC RX 250 WO HCPCS: Performed by: SURGERY

## 2025-07-28 PROCEDURE — 6360000002 HC RX W HCPCS: Performed by: INTERNAL MEDICINE

## 2025-07-28 PROCEDURE — 93985 DUP-SCAN HEMO COMPL BI STD: CPT

## 2025-07-28 PROCEDURE — 80048 BASIC METABOLIC PNL TOTAL CA: CPT

## 2025-07-28 RX ORDER — AMLODIPINE BESYLATE 10 MG/1
10 TABLET ORAL DAILY
Qty: 30 TABLET | Refills: 3 | Status: SHIPPED | OUTPATIENT
Start: 2025-07-28

## 2025-07-28 RX ADMIN — HEPARIN SODIUM 5000 UNITS: 5000 INJECTION INTRAVENOUS; SUBCUTANEOUS at 13:25

## 2025-07-28 RX ADMIN — HEPARIN SODIUM 3600 UNITS: 1000 INJECTION INTRAVENOUS; SUBCUTANEOUS at 11:55

## 2025-07-28 RX ADMIN — AMLODIPINE BESYLATE 10 MG: 10 TABLET ORAL at 12:13

## 2025-07-28 RX ADMIN — SODIUM CHLORIDE, PRESERVATIVE FREE 10 ML: 5 INJECTION INTRAVENOUS at 12:12

## 2025-07-28 RX ADMIN — CALCITRIOL CAPSULES 0.25 MCG 0.25 MCG: 0.25 CAPSULE ORAL at 12:13

## 2025-07-28 RX ADMIN — FINASTERIDE 5 MG: 5 TABLET, FILM COATED ORAL at 12:13

## 2025-07-28 RX ADMIN — HEPARIN SODIUM 5000 UNITS: 5000 INJECTION INTRAVENOUS; SUBCUTANEOUS at 05:53

## 2025-07-28 NOTE — DIALYSIS
[facility] INPATIENT SERVICES  DIALYSIS TREATMENT SUMMARY      Note: Consult with the attending physician for patient treatment orders, this document is not a physician order.      Patient Information   Patient Tyler Franks   Date of Birth November 22, 1965   Chart Number [id number]   Location [facility]   Location MRN [id number]   Gender Male   SSN (last 4)      Treatment Information   Treatment Type Hemodialysis   Treatment Id [id number]   Start Time July 28, 2025 08:12   End Time July 28, 2025 11:45   Acutal Duration 03:33     Treatment Balances   Total Saline Administered 500   Net Fluid Balance -1000    Hemodialysis Orders   Therapy Standard   Orders Verified Time 07/28/2025 08:00    Date Verified 07/28/2025   Duration 3:30   Isolated UF/Bypass No   BFR (mL) 400   DFR (mL) 600   Dialyzer Type OPTIFLUX 160NR   UF Order UF Goal Ordered   UF Goal Ordered (mL) 1000   Crit-Line used No   Heparin Initial Units Bolus No   Heparin IV Maintenance Bolus No   Heparin IV Infusion No   Potassium (mEq/L) 3   Calcium (mEq/L) 2.5   Bicarb (mg/dL) 35   Sodium (mEq/L) 140   Clinician Víctor Corea RN    AV Access   Access Type Central Venous Catheter   Central Venous Catheter   Access Type Catheter - Tunneled   Date Central Venous Catheter Placed 07/24/2025   Access Location Chest Wall - L   Current/New [facility] Patient Yes   Surgeon Dr Regina MD   1st Use Catheter Verified by Previous Use   Catheter Care Completed per Policy Yes   Dressing Dry and Intact on Arrival Yes   Dressing Changed Yes   Type of Dressing Film Biopatch/CHG   Dressing Changed By [facility] Staff   Type of HD Caps Curos   HD Caps Changed Yes   CVC Line Education Provided Yes - Infection Prevention      Vitals   Pre-Treatment Vitals   Time Is BP being recorded? Pre BP Map BP Method Pulse RR Temp How was Weight Obtained? Pre Weight Previous Dry Weight Previous Post Weight Metric Target Fluid Removal (mL) Dialysate Confirmed Clinician

## 2025-07-28 NOTE — PROGRESS NOTES
Urology Progress Note      SUBJECTIVE: Dunn catheter removed yesterday.  The patient has been voiding spontaneously on his own and feels like he is voiding normally however there is no voided volumes or postvoid residuals recorded.    OBJECTIVE:      REVIEW OF SYSTEMS:   Review of Systems      Physical  VITALS:  BP (!) 150/90   Pulse 83   Temp 98.2 °F (36.8 °C) (Oral)   Resp 16   Ht 1.676 m (5' 6\")   Wt 63 kg (139 lb)   SpO2 96%   BMI 22.44 kg/m²   TEMPERATURE:  Current - Temp: 98.2 °F (36.8 °C); Max - Temp  Av.1 °F (36.7 °C)  Min: 97.7 °F (36.5 °C)  Max: 98.4 °F (36.9 °C)   24 HR I&O   Intake/Output Summary (Last 24 hours) at 2025 0764  Last data filed at 2025 0538  Gross per 24 hour   Intake --   Output 3900 ml   Net -3900 ml     BACK: no tenderness in spine or flanks  ABDOMEN: Soft nondistended  HEART:  normal rate and regular rhythm  CHEST: Normal effort  GENITAL/URINARY: Normal uncircumcised phallus    Data       CBC:   Recent Labs     25  0312 25  0342 25  0230   WBC 10.1 8.8 10.4   HGB 9.3* 9.7* 10.1*   HCT 28.1* 30.2* 30.8*    217 215     BMP:    Recent Labs     25  0312 25  0342 25  0230    141 139   K 3.7  3.7 4.1  4.1 4.7  4.7    108* 107   CO2 21* 22 21*   BUN 76* 69* 65*   CREATININE 6.8* 7.0* 6.8*   GLUCOSE 117* 98 106*       No results for input(s): \"LABURIN\" in the last 72 hours.  No results for input(s): \"BC\" in the last 72 hours.  No results for input(s): \"BLOODCULT2\" in the last 72 hours.    Radiology:      Imaging studies:      ASSESSMENT AND PLAN    Patient Active Problem List   Diagnosis    PVD (peripheral vascular disease)    Abnormal stress test    Peripheral vascular disease, unspecified    PAD (peripheral artery disease)    Primary hypertension    CKD stage 3b, GFR 30-44 ml/min (HCC)    Vitamin D deficiency    Enlarged prostate    Mixed hyperlipidemia    Umbilical hernia without obstruction and without 
  Urology Progress Note      SUBJECTIVE: No complaints tolerating catheter well.    OBJECTIVE: Urine output adequate 3725 over the last 24 hours.    Physical  VITALS:  BP (!) 154/92   Pulse 79   Temp 98.4 °F (36.9 °C) (Temporal)   Resp 16   Ht 1.676 m (5' 6\")   Wt 63 kg (139 lb)   SpO2 96%   BMI 22.44 kg/m²   TEMPERATURE:  Current - Temp: 98.4 °F (36.9 °C); Max - Temp  Av.2 °F (36.8 °C)  Min: 98.1 °F (36.7 °C)  Max: 98.4 °F (36.9 °C)   24 HR I&O   Intake/Output Summary (Last 24 hours) at 2025 1640  Last data filed at 2025 1519  Gross per 24 hour   Intake 2120 ml   Output 4000 ml   Net -1880 ml     BACK: no tenderness in spine or flanks  ABDOMEN:  soft, non-distended, and non-tender  HEART:  normal rate and regular rhythm  CHEST: Normal respiratory effort  GENITAL/URINARY:  phallus meatus non-circumcised and right and left testis normal, Dunn catheter draining clear urine    Data       CBC:   Recent Labs     25  0320 25  0312 25  0342   WBC 8.2 10.1 8.8   HGB 9.6* 9.3* 9.7*   HCT 29.6* 28.1* 30.2*    226 217     BMP:    Recent Labs     25  0320 25  0312 25  0342    139 141   K 4.6  4.6 3.7  3.7 4.1  4.1   * 107 108*   CO2 19* 21* 22   BUN 86* 76* 69*   CREATININE 7.0* 6.8* 7.0*   GLUCOSE 102* 117* 98       No results for input(s): \"LABURIN\" in the last 72 hours.  No results for input(s): \"BC\" in the last 72 hours.  No results for input(s): \"BLOODCULT2\" in the last 72 hours.      U/A:    Lab Results   Component Value Date/Time    COLORU YELLOW 2025 01:20 AM    PHUR 6.5 2025 01:20 AM    WBCUA 1 2025 01:20 AM    RBCUA 15 2025 01:20 AM    BACTERIA Negative 2025 01:20 AM    CLARITYU Clear 2025 01:20 AM    LEUKOCYTESUR Negative 2025 01:20 AM    UROBILINOGEN 0.2 2025 01:20 AM    BILIRUBINUR Negative 2025 01:20 AM    BLOODU MODERATE 2025 01:20 AM    GLUCOSEU 100 2025 01:20 AM 
4 Eyes Skin Assessment     NAME:  Tyler Franks  YOB: 1965  MEDICAL RECORD NUMBER:  [id number]    The patient is being assessed for  Admission    I agree that at least one RN has performed a thorough Head to Toe Skin Assessment on the patient. ALL assessment sites listed below have been assessed.      Areas assessed by both nurses:    Head, Face, Ears, Shoulders, Back, Chest, Arms, Elbows, Hands, Sacrum. Buttock, Coccyx, Ischium, Legs. Feet and Heels, and Under Medical Devices         Does the Patient have a Wound? No noted wound(s)       Ruben Prevention initiated by RN: No  Wound Care Orders initiated by RN: No    For hospital-acquired stage 1 & 2 and ALL Stage 3,4, Unstageable, DTI, NWPT, and Complex wounds: place order “IP Wound Care/Ostomy Nurse Eval and Treat” by RN under : NO    New Ostomies, if present place, Ostomy referral order under : No     Nurse 1 eSignature: Electronically signed by Dania Antoine RN on 7/19/25 at 2:48 AM CDT    **SHARE this note so that the co-signing nurse can place an eSignature**    Nurse 2 eSignature: Electronically signed by Li Quezada LPN on 7/19/25 at 2:49 AM CDT    
4 Eyes Skin Assessment     NAME:  Tyler Franks  YOB: 1965  MEDICAL RECORD NUMBER:  [id number]    The patient is being assessed for  Shift Handoff    I agree that at least one RN has performed a thorough Head to Toe Skin Assessment on the patient. ALL assessment sites listed below have been assessed.      Areas assessed by both nurses:    Head, Face, Ears, Shoulders, Back, Chest, Arms, Elbows, Hands, Sacrum. Buttock, Coccyx, Ischium, Legs. Feet and Heels, and Under Medical Devices         Does the Patient have a Wound? No noted wound(s)       Ruben Prevention initiated by RN: Yes  Wound Care Orders initiated by RN: No    For hospital-acquired stage 1 & 2 and ALL Stage 3,4, Unstageable, DTI, NWPT, and Complex wounds: place order “IP Wound Care/Ostomy Nurse Eval and Treat” by RN under : No    New Ostomies, if present place, Ostomy referral order under : No     Nurse 1 eSignature: Electronically signed by Iveth Seaman RN on 7/26/25 at 2:51 PM CDT    **SHARE this note so that the co-signing nurse can place an eSignature**    Nurse 2 eSignature: Electronically signed by Oly See RN on 7/26/25 at 2:54 PM CDT    
Attempted to complete consult that Dr. Frost requested for urology, but there is no urologists on call until 7/22. Dr. Walton, the attending is aware of the situation. Will pass this information on to next nurses so that the consult can be completed when able to be.   
CLINICAL PHARMACY NOTE: MEDS TO BEDS    Total # of Prescriptions Filled: 1   The following medications were delivered to the patient:  Current Discharge Medication List        START taking these medications    Details   amLODIPine (NORVASC) 10 MG tablet Take 1 tablet by mouth daily  Qty: 30 tablet, Refills: 3               Additional Documentation:    Delivered RX to patient bedside. No copay.  
Comprehensive Nutrition Assessment    Type and Reason for Visit:  Initial, LOS    Nutrition Recommendations/Plan:   Modify current Renal diet to Regular low Phosphorus KASHIF  Follow for po intake and labs     Malnutrition Assessment:  Malnutrition Status:  No malnutrition (07/25/25 1159)    Context:  Acute Illness     Findings of the 6 clinical characteristics of malnutrition:  Energy Intake:  No decrease in energy intake  Weight Loss:  Mild weight loss     Body Fat Loss:  No body fat loss     Muscle Mass Loss:  No muscle mass loss    Fluid Accumulation:  No fluid accumulation     Strength:  Not Performed    Nutrition Assessment:    Following patient for LOS x 6 days.  Patient is receiving a Regular Real diet.  Modified to Regular Low Phosphorus and KASHIF diet.  K+ level WNL  PO intake has been good with intake ranging %.  Pt id dialysis at time of visit this morning.  Breakfast was held.  Weight has increased slightly (approx 2#) since 4/10/2025.  No edema noted    Nutrition Related Findings:    no edema noted.  BUN 38,  Creat 5.1,  GFR 12 Wound Type: None       Current Nutrition Intake & Therapies:    Average Meal Intake: %  Average Supplements Intake: None Ordered  ADULT DIET; Regular; No Added Salt (3-4 gm); Low Phosphorus (Less than 1000 mg)    Anthropometric Measures:  Height: 167.6 cm (5' 5.98\")  Ideal Body Weight (IBW): 142 lbs (65 kg)    Admission Body Weight: 63 kg (138 lb 14.2 oz) (stated)  Current Body Weight: 61 kg (134 lb 7.7 oz), 94.7 % IBW. Weight Source: Standing scale  Current BMI (kg/m2): 21.7  Usual Body Weight: 60 kg (132 lb 4.4 oz) (4/10/2025)  % Weight Change (Calculated): 1.7  Weight Adjustment For: No Adjustment  BMI Categories: Normal Weight (BMI 18.5-24.9)    Estimated Daily Nutrient Needs:  Energy Requirements Based On: Kcal/kg  Weight Used for Energy Requirements: Current  Energy (kcal/day): 9082-7879 kcals (25-30 kcals/kg)  Weight Used for Protein Requirements: 
H&P reviewed.  Moderate sedation  ASA III, Mallampati 3  
Nephrology ([facility]) Progress Note    Patient:  Tyler Franks  YOB: 1965  Date of Service: 7/28/2025  MRN: [id number]   Acct: [id number]   Primary Care Physician: Narinder Oscar MD  Advance Directive: Full Code  Admit Date: 7/19/2025       Hospital Day: 9  Referring Provider: Suni Walton MD    Patient independently seen and examined, Chart, Consults, Notes, Operative notes, Labs, Cardiology, and Radiology studies reviewed as available.    Subjective:  Tyler Franks is a 59 y.o. male for whom we were consulted for evaluation and treatment of acute kidney injury.  He has history of chronic kidney disease stage IV but has not seen the clinic in over a year.  Other history includes hypertension, PVD, hyperlipidemia, BPH and tobacco abuse.  He was sent to the emergency room by his primary care provider after labs demonstrated hyperkalemia and kidney injury.  Potassium was medically managed and had a Dunn catheter placed with findings of outlet obstruction on imaging.  As that did not resolve his renal function he was started on dialysis with PermCath placement and dialysis initiation on July 24.    Today, no overnight events.  Denied current chest pain, shortness of air at rest, nausea or vomiting.    Dialysis   Pt was seen on renal replacement therapy and I have personally seen and evaluated the patient and directed the therapy  Modality: Hemodialysis  Access: Catheter  Location: left upper  QB: 380  QD: 600  UF: 480      Allergies:  Patient has no known allergies.    Medicines:  Current Facility-Administered Medications   Medication Dose Route Frequency Provider Last Rate Last Admin    heparin (porcine) 1000 UNIT/ML injection 3,600 Units  3,600 Units IntraCATHeter Once in dialysis Tera Ding MD        amLODIPine (NORVASC) tablet 10 mg  10 mg Oral Daily Tera Ding MD   10 mg at 07/27/25 0856    sodium chloride flush 0.9 % injection 5-40 
Nephrology ([facility]) Progress Note      Patient:  Tyler Franks  YOB: 1965  Date of Service: 7/22/2025  MRN: [id number]   Acct: [id number]   Primary Care Physician: Narinder Oscar MD  Advance Directive: Full Code  Admit Date: 7/19/2025       Hospital Day: 3  Referring Provider: Suni Walton MD    Patient independently seen and examined, Chart, Consults, Notes, Operative notes, Labs, Cardiology, and Radiology studies reviewed as available.    Chief complaint: Abnormal labs.    Subjective:  Tyler Franks is a 59 y.o. male for whom we were consulted for evaluation and treatment of acute kidney injury/chronic kidney disease.  He has history of stage IV chronic kidney disease and has not seen nephrology as an outpatient in a year.  Patient has history of hypertension, peripheral vascular disease, hyperlipidemia, tobacco use and benign prostatic hypertrophy.  He was directed to go to the emergency room by his primary care doctor because he was found to have worsening of renal function and hyperkalemia on routine lab.  On arrival his potassium was 5.4 mmol, creatinine of 7 and metabolic acidosis.  Patient has received insulin, D50, Kayexalate.  Patient also has history of bladder outlet obstruction secondary to enlarged prostate.  He reports increased urinary frequency, poor urinary stream and nocturia.  He is currently receiving IV fluid with sodium bicarbonate.  Recent renal ultrasound consistent with bilateral hydronephrosis mild and distended urinary bladder consistent with outlet obstruction.    This morning patient agreed to proceed with dialysis.  However she has already eaten breakfast and now permacatheter has been scheduled for tomorrow morning.  He would be n.p.o. after midnight today.    Allergies:  Patient has no known allergies.    Medicines:  Current Facility-Administered Medications   Medication Dose Route Frequency Provider Last Rate Last 
Nephrology ([facility]) Progress Note      Patient:  Tyler Franks  YOB: 1965  Date of Service: 7/27/2025  MRN: [id number]   Acct: [id number]   Primary Care Physician: Narinder Oscar MD  Advance Directive: Full Code  Admit Date: 7/19/2025       Hospital Day: 8  Referring Provider: Suni Walton MD    Patient independently seen and examined, Chart, Consults, Notes, Operative notes, Labs, Cardiology, and Radiology studies reviewed as available.    Chief complaint: Abnormal labs.    Subjective:  Tyler rFanks is a 59 y.o. male for whom we were consulted for evaluation and treatment of acute kidney injury/chronic kidney disease.  He has history of stage IV chronic kidney disease and has not seen nephrology as an outpatient in a year.  Patient has history of hypertension, peripheral vascular disease, hyperlipidemia, tobacco use and benign prostatic hypertrophy.  He was directed to go to the emergency room by his primary care doctor because he was found to have worsening of renal function and hyperkalemia on routine lab.  On arrival, his potassium was 5.4 mmol, creatinine of 7 and he had  metabolic acidosis.  Patient has received insulin, D50, Kayexalate.  Patient also has history of bladder outlet obstruction secondary to enlarged prostate.  He reports increased urinary frequency, poor urinary stream and nocturia.   Recent renal ultrasound consistent with bilateral hydronephrosis mild and distended urinary bladder consistent with outlet obstruction.  A Dunn's catheter was inserted, no improvement of renal function.  His serum creatinine remains at 7 mg  Patient is status post permacatheter placement on July 24 and first dialysis was administered then and it was tolerated.  Dialysis #2 and 3 were given on July 25 and 26.  Today, patient offers no new complaints.      Allergies:  Patient has no known allergies.    Medicines:  Current Facility-Administered 
Nephrology ([facility]) Progress Note      Patient:  Tyler Franks  YOB: 1965  Date of Service: 7/26/2025  MRN: [id number]   Acct: [id number]   Primary Care Physician: Narinder Oscar MD  Advance Directive: Full Code  Admit Date: 7/19/2025       Hospital Day: 7  Referring Provider: Suni Walton MD    Patient independently seen and examined, Chart, Consults, Notes, Operative notes, Labs, Cardiology, and Radiology studies reviewed as available.    Chief complaint: Abnormal labs.    Subjective:  Tyler Franks is a 59 y.o. male for whom we were consulted for evaluation and treatment of acute kidney injury/chronic kidney disease.  He has history of stage IV chronic kidney disease and has not seen nephrology as an outpatient in a year.  Patient has history of hypertension, peripheral vascular disease, hyperlipidemia, tobacco use and benign prostatic hypertrophy.  He was directed to go to the emergency room by his primary care doctor because he was found to have worsening of renal function and hyperkalemia on routine lab.  On arrival, his potassium was 5.4 mmol, creatinine of 7 and he had  metabolic acidosis.  Patient has received insulin, D50, Kayexalate.  Patient also has history of bladder outlet obstruction secondary to enlarged prostate.  He reports increased urinary frequency, poor urinary stream and nocturia.   Recent renal ultrasound consistent with bilateral hydronephrosis mild and distended urinary bladder consistent with outlet obstruction.  A Dunn's catheter was inserted, no improvement of renal function.  His serum creatinine remains at 7 mg  Patient is status post permacatheter placement on July 24 and first dialysis was administered then and was tolerated.  Dialysis #2 was given on July 25.  Today, patient is seen and examined again on dialysis.  He offers no new complaints.  His BPs have overall improved.    Dialysis   Pt was seen on 
Nephrology ([facility]) Progress Note      Patient:  Tyler Franks  YOB: 1965  Date of Service: 7/21/2025  MRN: [id number]   Acct: [id number]   Primary Care Physician: Narinder Oscar MD  Advance Directive: Full Code  Admit Date: 7/19/2025       Hospital Day: 2  Referring Provider: Suni Walton MD    Patient independently seen and examined, Chart, Consults, Notes, Operative notes, Labs, Cardiology, and Radiology studies reviewed as available.    Chief complaint: Abnormal labs.    Subjective:  Tyler Franks is a 59 y.o. male for whom we were consulted for evaluation and treatment of acute kidney injury/chronic kidney disease.  He has history of stage IV chronic kidney disease and has not seen nephrology as an outpatient in a year.  Patient has history of hypertension, peripheral vascular disease, hyperlipidemia, tobacco use and benign prostatic hypertrophy.  He was directed to go to the emergency room by his primary care doctor because he was found to have worsening of renal function and hyperkalemia on routine lab.  On arrival his potassium was 5.4 mmol, creatinine of 7 and metabolic acidosis.  Patient has received insulin, D50, Kayexalate.  Patient also has history of bladder outlet obstruction secondary to enlarged prostate.  He reports increased urinary frequency, poor urinary stream and nocturia.  He is currently receiving IV fluid with sodium bicarbonate.  Recent renal ultrasound consistent with bilateral hydronephrosis mild and distended urinary bladder consistent with outlet obstruction.    This morning patient feels well.  He has good urine output but no improvement of renal function as his estimated GFR remains low.  Hemodialysis treatment was offered, at this point he is not mentally ready to initiate renal replacement therapy    Allergies:  Patient has no known allergies.    Medicines:  Current Facility-Administered Medications   Medication Dose 
Nephrology ([facility]) Progress Note      Patient:  Tyler Franks  YOB: 1965  Date of Service: 7/23/2025  MRN: [id number]   Acct: [id number]   Primary Care Physician: Narinder Oscar MD  Advance Directive: Full Code  Admit Date: 7/19/2025       Hospital Day: 4  Referring Provider: Suni Walton MD    Patient independently seen and examined, Chart, Consults, Notes, Operative notes, Labs, Cardiology, and Radiology studies reviewed as available.    Chief complaint: Abnormal labs.    Subjective:  Tyler Franks is a 59 y.o. male for whom we were consulted for evaluation and treatment of acute kidney injury/chronic kidney disease.  He has history of stage IV chronic kidney disease and has not seen nephrology as an outpatient in a year.  Patient has history of hypertension, peripheral vascular disease, hyperlipidemia, tobacco use and benign prostatic hypertrophy.  He was directed to go to the emergency room by his primary care doctor because he was found to have worsening of renal function and hyperkalemia on routine lab.  On arrival his potassium was 5.4 mmol, creatinine of 7 and metabolic acidosis.  Patient has received insulin, D50, Kayexalate.  Patient also has history of bladder outlet obstruction secondary to enlarged prostate.  He reports increased urinary frequency, poor urinary stream and nocturia.  He is currently receiving IV fluid with sodium bicarbonate.  Recent renal ultrasound consistent with bilateral hydronephrosis mild and distended urinary bladder consistent with outlet obstruction.  A Dunn's catheter was inserted, no improvement of renal function.  His serum creatinine remains at 7 mg    This morning, patient n.p.o., agreed to proceed with dialysis.  Vascular surgery is going to establish a dialysis access today.    Allergies:  Patient has no known allergies.    Medicines:  Current Facility-Administered Medications   Medication Dose Route 
Nephrology ([facility]) Progress Note      Patient:  Tyler Franks  YOB: 1965  Date of Service: 7/20/2025  MRN: [id number]   Acct: [id number]   Primary Care Physician: Narinder Oscar MD  Advance Directive: Full Code  Admit Date: 7/19/2025       Hospital Day: 1  Referring Provider: Suni Walton MD    Patient independently seen and examined, Chart, Consults, Notes, Operative notes, Labs, Cardiology, and Radiology studies reviewed as available.    Chief complaint: Abnormal labs.    Subjective:  Tyler Franks is a 59 y.o. male for whom we were consulted for evaluation and treatment of acute kidney injury/chronic kidney disease.  He has history of stage IV chronic kidney disease and has not seen nephrology as an outpatient in a year.  Patient has history of hypertension, peripheral vascular disease, hyperlipidemia, tobacco use and benign prostatic hypertrophy.  He was directed to go to the emergency room by his primary care doctor because he was found to have worsening of renal function and hyperkalemia on routine lab.  On arrival his potassium was 5.4 mmol, creatinine of 7 and metabolic acidosis.  Patient has received insulin, D50, Kayexalate.  Patient also has history of bladder outlet obstruction secondary to enlarged prostate.  He reports increased urinary frequency, poor urinary stream and nocturia.  He is currently receiving IV fluid with sodium bicarbonate.  Recent renal ultrasound consistent with bilateral hydronephrosis mild and distended urinary bladder consistent with outlet obstruction.    This morning patient feels well.  He has good urine output but no renal recovery.  He agreed to proceed with dialysis if needed    Allergies:  Patient has no known allergies.    Medicines:  Current Facility-Administered Medications   Medication Dose Route Frequency Provider Last Rate Last Admin    sodium chloride flush 0.9 % injection 5-40 mL  5-40 mL IntraVENous 
Patient seen in Dialysis, treatment running per left IJ tunneled catheter with good flow rates noted. Dressing over site is CDI, minimal edema to site. Arm map ordered for access planning. Patient has follow up appointment with [facility] and post op perm instructions are in AVS. Post op instructions reviewed with patient, he voices understanding.   
Per order removed stone catheter. Pt is due to void.  
Progress Note    Date:2025       Room:0319/319-02  Patient Name:Tyelr Franks     YOB: 1965     Age:59 y.o.      Subjective    Subjective: 59-year-old male with a history of CKD stage IV, hyperlipidemia, hypertension, peripheral vascular disease, presenting from outside facility with concerns of worsening renal function noted as an outpatient rehab.  On arrival. Labs with noted BUN of 97, creatinine 7.6, renal ultrasound obtained which showed mild bilateral hydronephrosis with mild diffuse bladder wall thickening.  Patient was seen by nephrology, was placed on low rate IV fluids as well as Dunn catheter placed due to possible associated postobstructive concerns of worsening renal failure.  Urology evaluated, to continue flomax and finasteride.     Voiding trial today, no improvement in renal function, plan for perm cath placement tomorrow for initiation of dialysis.    Seen in house this morning with no family member present, denied any acute overnight event, denied any chest pain, shortness of breath, nausea, vomiting, abdominal pain.       Review of Systems: 10 point system reviewed negative except as stated above.    Objective         Vitals Last 24 Hours:  TEMPERATURE:  Temp  Av.3 °F (36.8 °C)  Min: 98.1 °F (36.7 °C)  Max: 98.5 °F (36.9 °C)  RESPIRATIONS RANGE: Resp  Av  Min: 14  Max: 17  PULSE OXIMETRY RANGE: SpO2  Av.6 %  Min: 92 %  Max: 97 %  PULSE RANGE: Pulse  Av.8  Min: 82  Max: 89  BLOOD PRESSURE RANGE: Systolic (24hrs), Av , Min:145 , Max:154   ; Diastolic (24hrs), Av, Min:92, Max:99    I/O (24Hr):    Intake/Output Summary (Last 24 hours) at 2025 1205  Last data filed at 2025 1203  Gross per 24 hour   Intake 2360 ml   Output 4375 ml   Net -2015 ml       Physical Examination:  General: no acute distress lying comfortably in bed.  HEENT: Atraumatic normocephalic, range of motion normal, no JVD, no tracheal deviation noted.  Cardiac: Normal 
Progress Note    Date:2025       Room:0319/319-02  Patient Name:Tyler Frakns     YOB: 1965     Age:59 y.o.      Subjective    Subjective: 59-year-old male with a history of CKD stage IV, hyperlipidemia, hypertension, peripheral vascular disease, presenting from outside facility with concerns of worsening renal function noted as an outpatient rehab.  On arrival.  Labs with noted BUN of 97, creatinine 7.6, renal ultrasound obtained which showed mild bilateral hydronephrosis with mild diffuse bladder wall thickening.  Patient was seen by nephrology, was placed on low rate IV fluids as well as Dunn catheter placed due to possible associated postobstructive concerns of worsening renal failure.  Urology was consulted by nephrology however no one on-call until 2025.    Seen in house this morning with no family member present, denied any acute overnight event, denied any chest pain, shortness of breath, nausea, vomiting, abdominal pain.       Review of Systems: 10 point system reviewed negative except as stated above.    Objective         Vitals Last 24 Hours:  TEMPERATURE:  Temp  Av.3 °F (36.8 °C)  Min: 97.7 °F (36.5 °C)  Max: 99 °F (37.2 °C)  RESPIRATIONS RANGE: Resp  Av.8  Min: 16  Max: 20  PULSE OXIMETRY RANGE: SpO2  Av %  Min: 95 %  Max: 97 %  PULSE RANGE: Pulse  Av.2  Min: 87  Max: 98  BLOOD PRESSURE RANGE: Systolic (24hrs), Av , Min:108 , Max:148   ; Diastolic (24hrs), Av, Min:79, Max:87    I/O (24Hr):    Intake/Output Summary (Last 24 hours) at 2025 1020  Last data filed at 2025 1018  Gross per 24 hour   Intake 240 ml   Output 2520 ml   Net -2280 ml       Physical Examination:  General: no acute distress lying comfortably in bed.  HEENT: Atraumatic normocephalic, range of motion normal, no JVD, no tracheal deviation noted.  Cardiac: Normal S1-S2   Respiratory: clear To auscultation bilaterally, no rhonchi or rales, no wheezing  Abdomen: Soft, 
Progress Note    Date:2025       Room:0319/319-02  Patient Name:Tyler Franks     YOB: 1965     Age:59 y.o.      Subjective    Subjective: 59-year-old male with a history of CKD stage IV, hyperlipidemia, hypertension, peripheral vascular disease, presenting from outside facility with concerns of worsening renal function noted as an outpatient rehab.  On arrival. Labs with noted BUN of 97, creatinine 7.6, renal ultrasound obtained which showed mild bilateral hydronephrosis with mild diffuse bladder wall thickening.  Patient was seen by nephrology, was placed on low rate IV fluids as well as Dunn catheter placed due to possible associated postobstructive concerns of worsening renal failure.  Urology evaluated, to continue flomax and finasteride.     Voiding trial in few days, CT abd/pel ordered.    Seen in house this morning with no family member present, denied any acute overnight event, denied any chest pain, shortness of breath, nausea, vomiting, abdominal pain.       Review of Systems: 10 point system reviewed negative except as stated above.    Objective         Vitals Last 24 Hours:  TEMPERATURE:  Temp  Av.8 °F (36.6 °C)  Min: 97.5 °F (36.4 °C)  Max: 98.1 °F (36.7 °C)  RESPIRATIONS RANGE: Resp  Av.5  Min: 16  Max: 20  PULSE OXIMETRY RANGE: SpO2  Av.4 %  Min: 93 %  Max: 99 %  PULSE RANGE: Pulse  Av  Min: 91  Max: 101  BLOOD PRESSURE RANGE: Systolic (24hrs), Av , Min:134 , Max:145   ; Diastolic (24hrs), Av, Min:99, Max:111    I/O (24Hr):    Intake/Output Summary (Last 24 hours) at 2025 0971  Last data filed at 2025 0551  Gross per 24 hour   Intake 0 ml   Output 4500 ml   Net -4500 ml       Physical Examination:  General: no acute distress lying comfortably in bed.  HEENT: Atraumatic normocephalic, range of motion normal, no JVD, no tracheal deviation noted.  Cardiac: Normal S1-S2   Respiratory: clear To auscultation bilaterally, no rhonchi or rales, 
Progress Note    Date:2025       Room:0319/319-02  Patient Name:Tyler Franks     YOB: 1965     Age:59 y.o.      Subjective    Subjective: 59-year-old male with a history of CKD stage IV, hyperlipidemia, hypertension, peripheral vascular disease, presenting from outside facility with concerns of worsening renal function noted as an outpatient rehab.  On arrival. Labs with noted BUN of 97, creatinine 7.6, renal ultrasound obtained which showed mild bilateral hydronephrosis with mild diffuse bladder wall thickening.  Patient was seen by nephrology, was placed on low rate IV fluids as well as Dunn catheter placed due to possible associated postobstructive concerns of worsening renal failure.  Urology evaluated, to continue flomax and finasteride.     Voiding trial on , patient able to urinate on his own and stated at baseline. No improvement in renal function, s/p perm cath placement  and initiated on dialysis.    Seen in house this morning with no family member present, denied any acute overnight event, denied any chest pain, shortness of breath, nausea, vomiting, abdominal pain. Tolerating dialysis, SW following for outpt dialysis set up.       Review of Systems: 10 point system reviewed negative except as stated above.    Objective         Vitals Last 24 Hours:  TEMPERATURE:  Temp  Av °F (36.7 °C)  Min: 97.3 °F (36.3 °C)  Max: 98.3 °F (36.8 °C)  RESPIRATIONS RANGE: Resp  Avg: 15.5  Min: 14  Max: 16  PULSE OXIMETRY RANGE: SpO2  Av %  Min: 95 %  Max: 99 %  PULSE RANGE: Pulse  Av.4  Min: 52  Max: 109  BLOOD PRESSURE RANGE: Systolic (24hrs), Av , Min:108 , Max:162   ; Diastolic (24hrs), Av, Min:56, Max:103    I/O (24Hr):    Intake/Output Summary (Last 24 hours) at 2025 1006  Last data filed at 2025 0325  Gross per 24 hour   Intake 850 ml   Output 1925 ml   Net -1075 ml       Physical Examination:  General: no acute distress lying comfortably in 
Progress Note    Date:2025       Room:0319/319-02  Patient Name:Tyler Franks     YOB: 1965     Age:59 y.o.      Subjective    Subjective: 59-year-old male with a history of CKD stage IV, hyperlipidemia, hypertension, peripheral vascular disease, presenting from outside facility with concerns of worsening renal function noted as an outpatient rehab.  On arrival. Labs with noted BUN of 97, creatinine 7.6, renal ultrasound obtained which showed mild bilateral hydronephrosis with mild diffuse bladder wall thickening.  Patient was seen by nephrology, was placed on low rate IV fluids as well as Dunn catheter placed due to possible associated postobstructive concerns of worsening renal failure.  Urology evaluated, to continue flomax and finasteride.     Voiding trial on , patient able to urinate on his own and stated at baseline. No improvement in renal function, s/p perm cath placement  and initiated on dialysis.    Seen in house this morning with no family member present, denied any acute overnight event, denied any chest pain, shortness of breath, nausea, vomiting, abdominal pain. Tolerating dialysis, SW following for outpt dialysis set up.       Review of Systems: 10 point system reviewed negative except as stated above.    Objective         Vitals Last 24 Hours:  TEMPERATURE:  Temp  Av.6 °F (36.4 °C)  Min: 97.2 °F (36.2 °C)  Max: 98.2 °F (36.8 °C)  RESPIRATIONS RANGE: Resp  Avg: 15.7  Min: 14  Max: 16  PULSE OXIMETRY RANGE: SpO2  Av.1 %  Min: 94 %  Max: 100 %  PULSE RANGE: Pulse  Av.1  Min: 64  Max: 101  BLOOD PRESSURE RANGE: Systolic (24hrs), Av , Min:108 , Max:154   ; Diastolic (24hrs), Av, Min:56, Max:99    I/O (24Hr):    Intake/Output Summary (Last 24 hours) at 2025 0938  Last data filed at 2025 1202  Gross per 24 hour   Intake 500 ml   Output 3500 ml   Net -3000 ml       Physical Examination:  General: no acute distress lying comfortably in 
Progress Note    Date:2025       Room:0319/319-02  Patient Name:Tyler Franks     YOB: 1965     Age:59 y.o.      Subjective    Subjective: 59-year-old male with a history of CKD stage IV, hyperlipidemia, hypertension, peripheral vascular disease, presenting from outside facility with concerns of worsening renal function noted as an outpatient rehab.  On arrival. Labs with noted BUN of 97, creatinine 7.6, renal ultrasound obtained which showed mild bilateral hydronephrosis with mild diffuse bladder wall thickening.  Patient was seen by nephrology, was placed on low rate IV fluids as well as Dunn catheter placed due to possible associated postobstructive concerns of worsening renal failure.  Urology evaluated, to continue flomax and finasteride.     Voiding trial on , patient able to urinate on his own and stated at baseline. No improvement in renal function, s/p perm cath placement  and initiated on dialysis.    Seen in house this morning with no family member present, denied any acute overnight event, denied any chest pain, shortness of breath, nausea, vomiting, abdominal pain. Tolerating dialysis, SW following for outpt dialysis set up.       Review of Systems: 10 point system reviewed negative except as stated above.    Objective         Vitals Last 24 Hours:  TEMPERATURE:  Temp  Av.6 °F (36.4 °C)  Min: 97.3 °F (36.3 °C)  Max: 98.6 °F (37 °C)  RESPIRATIONS RANGE: Resp  Av.6  Min: 7  Max: 20  PULSE OXIMETRY RANGE: SpO2  Av.9 %  Min: 94 %  Max: 100 %  PULSE RANGE: Pulse  Av.6  Min: 79  Max: 115  BLOOD PRESSURE RANGE: Systolic (24hrs), Av , Min:118 , Max:176   ; Diastolic (24hrs), Av, Min:78, Max:103    I/O (24Hr):    Intake/Output Summary (Last 24 hours) at 2025 0936  Last data filed at 2025 0111  Gross per 24 hour   Intake 1000 ml   Output 2250 ml   Net -1250 ml       Physical Examination:  General: no acute distress lying comfortably in 
Progress Note    Date:2025       Room:0319/319-02  Patient Name:Tyler Franks     YOB: 1965     Age:59 y.o.      Subjective    Subjective: 59-year-old male with a history of CKD stage IV, hyperlipidemia, hypertension, peripheral vascular disease, presenting from outside facility with concerns of worsening renal function noted as an outpatient rehab.  On arrival. Labs with noted BUN of 97, creatinine 7.6, renal ultrasound obtained which showed mild bilateral hydronephrosis with mild diffuse bladder wall thickening.  Patient was seen by nephrology, was placed on low rate IV fluids as well as Dunn catheter placed due to possible associated postobstructive concerns of worsening renal failure.  Urology evaluated, to continue flomax and finasteride.     Voiding trial started , patient able to urinate on his own and stated at baseline. Need to bladder scan to ensure no PVR > 300.  No improvement in renal function, plan for perm cath placement today for initiation of dialysis.    Seen in house this morning with no family member present, denied any acute overnight event, denied any chest pain, shortness of breath, nausea, vomiting, abdominal pain.       Review of Systems: 10 point system reviewed negative except as stated above.    Objective         Vitals Last 24 Hours:  TEMPERATURE:  Temp  Av.1 °F (36.7 °C)  Min: 97.7 °F (36.5 °C)  Max: 98.4 °F (36.9 °C)  RESPIRATIONS RANGE: Resp  Av  Min: 16  Max: 18  PULSE OXIMETRY RANGE: SpO2  Av.3 %  Min: 96 %  Max: 97 %  PULSE RANGE: Pulse  Av  Min: 79  Max: 86  BLOOD PRESSURE RANGE: Systolic (24hrs), Av , Min:150 , Max:167   ; Diastolic (24hrs), Av, Min:90, Max:101    I/O (24Hr):    Intake/Output Summary (Last 24 hours) at 2025 0961  Last data filed at 2025 0744  Gross per 24 hour   Intake --   Output 4100 ml   Net -4100 ml       Physical Examination:  General: no acute distress lying comfortably in bed.  HEENT: 
Received patient from dialysis, dialysis stated they took off 1 L of fluid and patient tolerated well. Since this was his first dialysis treatment he will go again tomorrow 7/25/25 and again on 7/26/25. Patient back in room complaining of some pain. Please see mar for details on pain medication given.     Patients vital signs are BP (!) 141/88   Pulse 99   Temp 97.3 °F (36.3 °C) (Temporal)   Resp 16   Ht 1.676 m (5' 6\")   Wt 62.3 kg (137 lb 7 oz)   SpO2 100%   BMI 22.18 kg/m²   
Tyler Franks arrived to room # 319.   Presented with: TEMITOPE  Mental Status: Patient is oriented, alert, coherent, logical, thought processes intact, and able to concentrate and follow conversation.   Vitals:    07/19/25 0100   BP: (!) 164/94   Pulse: 77   Resp: 20   Temp: 98.2 °F (36.8 °C)   SpO2: 97%     Patient safety contract and falls prevention contract reviewed with patient Yes.  Oriented Patient to room.  Call light within reach. Yes.  Needs, issues or concerns expressed at this time: no.      Electronically signed by Dania Antoine RN on 7/19/2025 at 2:48 AM    
Vascular Surgery -    Daily Progress Note    Pt Name: Tyler Franks  Medical Record Number: [id number]  Date of Birth 1965   Today's Date: 7/25/2025      SUBJECTIVE:     Patient was seen and examined while in dialysis.   Pain is  controlled  No complaints at this time.    OBJECTIVE:     Patient Vitals for the past 24 hrs:   BP Temp Temp src Pulse Resp SpO2 Weight   07/25/25 1100 116/85 -- -- 69 -- -- --   07/25/25 1030 127/88 -- -- 75 -- -- --   07/25/25 1000 (!) 137/99 -- -- (!) 108 -- -- --   07/25/25 0930 (!) 148/103 -- -- (!) 102 -- -- --   07/25/25 0900 (!) 131/102 -- -- 94 -- -- --   07/25/25 0851 (!) 138/99 -- -- 97 -- -- --   07/25/25 0848 -- -- -- 92 15 94 % --   07/25/25 0845 (!) 168/94 -- -- 92 -- -- --   07/25/25 0727 (!) 134/95 98.6 °F (37 °C) Temporal 88 16 95 % --   07/25/25 0408 (!) 142/94 97.5 °F (36.4 °C) Temporal 92 14 95 % 61 kg (134 lb 7.7 oz)   07/25/25 0051 -- -- -- -- 16 -- --   07/25/25 0012 (!) 150/99 97.5 °F (36.4 °C) Temporal 88 16 96 % --   07/25/25 0004 -- -- -- 95 -- -- --   07/24/25 1931 (!) 138/97 97.3 °F (36.3 °C) Temporal 92 16 95 % --   07/24/25 1712 -- -- -- -- 16 -- --   07/24/25 1708 (!) 141/88 97.3 °F (36.3 °C) Temporal 99 16 100 % --   07/24/25 1637 (!) 148/91 -- -- (!) 106 -- -- --   07/24/25 1600 (!) 145/86 -- -- 100 -- -- --   07/24/25 1530 (!) 145/97 -- -- 100 -- -- --   07/24/25 1500 (!) 118/91 -- -- 91 -- -- --   07/24/25 1430 (!) 142/78 -- -- 100 -- -- --   07/24/25 1400 (!) 149/99 -- -- 93 -- -- --   07/24/25 1336 (!) 133/91 -- -- 94 -- -- --   07/24/25 1330 (!) 146/94 -- -- (!) 102 11 95 % --   07/24/25 1320 -- -- -- (!) 105 13 95 % --   07/24/25 1316 (!) 146/94 -- -- -- -- -- --   07/24/25 1315 -- -- -- (!) 110 12 95 % --   07/24/25 1315 118/79 -- -- (!) 112 18 95 % --   07/24/25 1311 (!) 155/89 -- -- -- -- -- --   07/24/25 1310 (!) 155/89 -- -- (!) 112 (!) 7 95 % --   07/24/25 1305 (!) 144/88 -- -- (!) 111 13 94 % --   07/24/25 1300 (!) 156/92 -- -- -- -- -- 
Vascular lab preliminary.  Bilateral upper extremity arterial and venous vein mapping completed.  Final report pending.  
Atraumatic normocephalic, range of motion normal, no JVD, no tracheal deviation noted.  Cardiac: Normal S1-S2   Respiratory: clear To auscultation bilaterally, no rhonchi or rales, no wheezing  Abdomen: Soft, positive bowel sounds in all quadrants, no distention, nontender to palpation, no rebound noted.    Extremities: no tenderness, no edema, moves all extremities  Psych: Affect normal and good eye contact, behavioral normal.        Labs/Imaging/Diagnostics    Labs:  CBC:  Recent Labs     07/22/25 0342 07/23/25 0230 07/24/25 0221   WBC 8.8 10.4 9.6   RBC 3.23* 3.35* 3.28*   HGB 9.7* 10.1* 9.8*   HCT 30.2* 30.8* 30.7*   MCV 93.5 91.9 93.6   RDW 12.8 12.7 12.6    215 211     CHEMISTRIES:  Recent Labs     07/22/25 0342 07/23/25 0230 07/24/25 0221    139 139   K 4.1  4.1 4.7  4.7 4.3   * 107 111*   CO2 22 21* 18*   BUN 69* 65* 65*   CREATININE 7.0* 6.8* 6.8*   GLUCOSE 98 106* 102*     PT/INR:  No results for input(s): \"PROTIME\", \"INR\" in the last 72 hours.    APTT:  No results for input(s): \"APTT\" in the last 72 hours.    LIVER PROFILE:  Recent Labs     07/22/25 0342 07/23/25 0230 07/24/25 0221   AST 17 16 16   ALT 21 19 18   BILITOT <0.2 <0.2 <0.2   ALKPHOS 84 84 87       Imaging Last 24 Hours:  US RETROPERITONEAL COMPLETE  Result Date: 7/19/2025  EXAM:  RETROPERITONEAL SONOGRAM.  Date:  July 19, 2025  Comparison:  None  HISTORY:  Acute kidney injury  TECHNIQUE:  Sonography of the kidneys and bladder was performed.  FINDINGS:  Right Kidney:  The kidney measures 9.9 x 5.0 x 5.4 cm.. The echotexture and cortical thickness are normal. There is mild hydronephrosis. No calculi are seen.  Left kidney:  The kidney measures 9.9 x 5.1 x 4.6 cm.. The echotexture and cortical thickness are normal. There is a 3.6 x 4.1 x 4.1 cm cyst. There is mild hydronephrosis. No calculi are seen.  Bladder: The bladder is underdistended, with diffuse bladder wall thickening. No intraluminal filling defects.  
 MG per tablet 1 tablet  1 tablet Oral Q6H PRN Radha Tapia DO   1 tablet at 07/25/25 0021    naloxone (NARCAN) injection 0.4 mg  0.4 mg IntraVENous PRN Radha Tapia DO           Past Medical History:  Past Medical History:   Diagnosis Date    CKD (chronic kidney disease) stage 4, GFR 15-29 ml/min ([facility])     As per EMR review Hx CKD 4 with GFR 21 and Cr 3.3 on [id number]    Full dentures     HLD (hyperlipidemia)     HTN (hypertension)     PVD (peripheral vascular disease)     Tobacco abuse, in remission        Past Surgical History:  Past Surgical History:   Procedure Laterality Date    AXILLARY-FEMORAL BYPASS GRAFT Bilateral 06/10/2024    AXILLARY BI-FEMORAL BYPASS performed by Radha Tapia DO at [facility] OR    CATARACT EXTRACTION EXTRACAPSULAR W/ INTRAOCULAR LENS IMPLANTATION Right 2023    COLONOSCOPY N/A 2024    states rupinder was told that he had a few polyps but was also told it was nothing to worry about    ENDOSCOPY, COLON, DIAGNOSTIC      HAND SURGERY Right     HERNIA REPAIR      MULTIPLE TOOTH EXTRACTIONS Bilateral 2021    ~2021 \"three to for yeras agp\"    VASCULAR SURGERY  03/15/2024    Left femoral access, left leg runoff    VASCULAR SURGERY Right 06/13/2024    RIGHT LOWER EXTERMITY ARTERIOGRAM performed by Radha Tapia DO at [facility] OR    VASCULAR SURGERY  06/13/2024    exploration of right femoral anastomosis. Right leg thrombectomy. Angiogram       Family History  Family History   Problem Relation Age of Onset    High Cholesterol Mother     High Blood Pressure Mother     Kidney Disease Father     High Cholesterol Father     High Blood Pressure Father     Hypertension Sister     Other (drowned) Sister         Fell in a well as a little girl and drowned    Unknown Brother     Colon Cancer Brother     Unknown Brother     No Known Problems Brother     No Known Problems Brother     No Known Problems Brother     No Known Problems Daughter     No Known Problems Daughter     Hypertension 
SONOGRAM.  Date:  July 19, 2025  Comparison:  None  HISTORY:  Acute kidney injury  TECHNIQUE:  Sonography of the kidneys and bladder was performed.  FINDINGS:  Right Kidney:  The kidney measures 9.9 x 5.0 x 5.4 cm.. The echotexture and cortical thickness are normal. There is mild hydronephrosis. No calculi are seen.  Left kidney:  The kidney measures 9.9 x 5.1 x 4.6 cm.. The echotexture and cortical thickness are normal. There is a 3.6 x 4.1 x 4.1 cm cyst. There is mild hydronephrosis. No calculi are seen.  Bladder: The bladder is underdistended, with diffuse bladder wall thickening. No intraluminal filling defects.      Impression:  Mild bilateral hydronephrosis. If further evaluation is needed then a CT scan could be performed.  Mild diffuse bladder wall thickening which could be due to underdistention or chronic outlet obstruction or neurogenic bladder.  Left renal cyst better observed on CT scan performed May 19, 2024.   ______________________________________ Electronically signed by: BRENDA FIGUEROA M.D. Date:     07/19/2025 Time:    10:33        Assessment   1.  Acute kidney injury/no improvement.  2.  Hypertensive renal disease/bladder outlet obstruction  3.  Stage IV CKD baseline/baseline GFR below 20 mL.  4.  Poor compliance with office visits  5.  Metabolic acidemia.  6.  Recurrent mild hyperkalemia.  7.  Secondary hyperparathyroidism.  8.  Peripheral vascular disease, status post axillobifemoral bypass surgery    Plan:  1.  Patient is currently n.p.o., waiting to have permacatheter placement.  2.  Initiate hemodialysis treatment tentatively today.  3.  Outpatient set up for dialysis also needed.  4.  Plan was discussed with the patient      Thank you for the consult, we appreciate the opportunity to provide care to your patients.  Feel free to contact me if I can be of any further assistance.      Wilton Frost MD  07/24/25  11:12 AM

## 2025-07-28 NOTE — DISCHARGE SUMMARY
Discharge Summary    Date:7/28/2025        Patient Name:Tyler Franks     YOB: 1965     Age:59 y.o.    Admit Date:7/19/2025   Admission Condition:fair   Discharged Condition:stable  Discharge Date: 07/28/25       Discharge Diagnoses   Principal Problem:    Acute kidney injury superimposed on stage 4 chronic kidney disease (HCC)  Active Problems:    Hyperkalemia    Benign prostatic hyperplasia with urinary obstruction    Hydronephrosis, bilateral  Resolved Problems:    * No resolved hospital problems. *      Hospital Stay   Narrative of Hospital Course:     59-year-old male with a history of CKD stage IV, hyperlipidemia, hypertension, peripheral vascular disease, presenting from outside facility with concerns of worsening renal function noted as an outpatient rehab.  On arrival. Labs with noted BUN of 97, creatinine 7.6, renal ultrasound obtained which showed mild bilateral hydronephrosis with mild diffuse bladder wall thickening.  Patient was seen by nephrology, was placed on low rate IV fluids as well as Dunn catheter placed due to possible associated postobstructive concerns of worsening renal failure.  Urology evaluated, to continue flomax and finasteride. Voiding trial on 7/22, patient able to urinate on his own and stated at baseline. No improvement in renal function, s/p perm cath placement 7/24 and initiated on dialysis. Tolerated dialysis well in house, outpt setup by  for MWF 0715. Discharge home in stable condition to follow up with vascular, nephrology and PCP outpt.      Physical Examination:  General: no acute distress lying comfortably in bed.  HEENT: Atraumatic normocephalic, range of motion normal, no JVD, no tracheal deviation noted.  Chest: Left IJ perm cath  Cardiac: Normal S1-S2   Respiratory: clear To auscultation bilaterally, no rhonchi or rales, no wheezing  Abdomen: Soft, positive bowel sounds in all quadrants, no distention, nontender to palpation, no rebound noted.

## 2025-07-31 LAB
ECHO BSA: 1.71 M2
VAS LEFT ARM BP: 120 MMHG
VAS LEFT AXILLARY VEIN DIAMETER: 0.37 CM
VAS LEFT BASILIC VEIN LOWER ARM PROX DIAM: 0.1 CM
VAS LEFT BASILIC VEIN UPPER ARM DIST DIAM: 0.14 CM
VAS LEFT BASILIC VEIN UPPER ARM MID DIAM: 0.22 CM
VAS LEFT BASLIC VEIN LOWER ARM DIST DIAM: 0.11 CM
VAS LEFT BRACHIAL A DIST PSV: 58 CM/S
VAS LEFT BRACHIAL A PROX PSV: 63.7 CM/S
VAS LEFT BRACHIAL DIST AP DIAM: 0.41 CM
VAS LEFT BRACHIAL VEIN 1 DIAM: 0.32 CM
VAS LEFT BRACHIAL VEIN 2 DIAM: 0.31 CM
VAS LEFT CEPHALIC VEIN LOWER ARM DIST DIAM: 0.12 CM
VAS LEFT CEPHALIC VEIN LOWER ARM PROX DIAM: 0.13 CM
VAS LEFT CEPHALIC VEIN UPPER ARM CONFLUENCE DIAM: 0.2 CM
VAS LEFT CEPHALIC VEIN UPPER ARM MID DIAM: 0.18 CM
VAS LEFT CEPHALIC VEIN UPPER ARM PROX DIAM: 0.2 CM
VAS LEFT RADIAL A DIST PSV: 46.7 CM/S
VAS LEFT RADIAL A MID PSV: 56 CM/S
VAS LEFT RADIAL A PROX PSV: 47.7 CM/S
VAS LEFT RADIAL DIST AP DIAM: 0.25 CM
VAS LEFT ULNAR A DIST PSV: 23.5 CM/S
VAS LEFT ULNAR A MID PSV: 16.9 CM/S
VAS LEFT ULNAR A PROX PSV: 34.4 CM/S
VAS LEFT ULNAR DIST AP DIAM: 0.16 CM
VAS RIGHT ARM BP: 130 MMHG
VAS RIGHT AXILLARY VEIN DIAMETER: 0.34 CM
VAS RIGHT BASILIC VEIN LOWER ARM PROX DIAM: 0.12 CM
VAS RIGHT BASILIC VEIN UPPER ARM DIST DIAM: 0.16 CM
VAS RIGHT BASILIC VEIN UPPER ARM MID DIAM: 0.2 CM
VAS RIGHT BASLIC VEIN LOWER ARM DIST DIAM: 0.12 CM
VAS RIGHT BRACHIAL A DIST PSV: 51.8 CM/S
VAS RIGHT BRACHIAL A PROX EDV: 8.3 CM/S
VAS RIGHT BRACHIAL A PROX PSV: 77.7 CM/S
VAS RIGHT BRACHIAL DIST AP DIAM: 0.37 CM
VAS RIGHT BRACHIAL VEIN 1 DIAM: 0.39 CM
VAS RIGHT BRACHIAL VEIN 2 DIAM: 0.34 CM
VAS RIGHT CEPHALIC VEIN LOWER ARM DIST DIAM: 0.11 CM
VAS RIGHT CEPHALIC VEIN UPPER ARM CONFLUENCE DIAM: 0.26 CM
VAS RIGHT CEPHALIC VEIN UPPER ARM MID DIAM: 0.15 CM
VAS RIGHT CEPHALIC VEIN UPPER ARM PROX DIAM: 0.15 CM
VAS RIGHT RADIAL A DIST PSV: 29 CM/S
VAS RIGHT RADIAL A MID PSV: 48.7 CM/S
VAS RIGHT RADIAL A PROX PSV: 37.3 CM/S
VAS RIGHT RADIAL DIST AP DIAM: 0.24 CM
VAS RIGHT ULNAR A DIST PSV: 19.6 CM/S
VAS RIGHT ULNAR A MID PSV: 19.3 CM/S
VAS RIGHT ULNAR A PROX PSV: 25.5 CM/S
VAS RIGHT ULNAR DIST AP DIAM: 0.15 CM

## 2025-08-19 ENCOUNTER — OFFICE VISIT (OUTPATIENT)
Dept: VASCULAR SURGERY | Age: 60
End: 2025-08-19
Payer: MEDICAID

## 2025-08-19 VITALS
TEMPERATURE: 98.6 F | HEART RATE: 81 BPM | OXYGEN SATURATION: 99 % | DIASTOLIC BLOOD PRESSURE: 84 MMHG | SYSTOLIC BLOOD PRESSURE: 126 MMHG

## 2025-08-19 DIAGNOSIS — N17.9 ACUTE KIDNEY INJURY SUPERIMPOSED ON STAGE 4 CHRONIC KIDNEY DISEASE (HCC): Primary | ICD-10-CM

## 2025-08-19 DIAGNOSIS — N18.4 ACUTE KIDNEY INJURY SUPERIMPOSED ON STAGE 4 CHRONIC KIDNEY DISEASE (HCC): Primary | ICD-10-CM

## 2025-08-19 PROCEDURE — G8420 CALC BMI NORM PARAMETERS: HCPCS | Performed by: NURSE PRACTITIONER

## 2025-08-19 PROCEDURE — 3017F COLORECTAL CA SCREEN DOC REV: CPT | Performed by: NURSE PRACTITIONER

## 2025-08-19 PROCEDURE — 3074F SYST BP LT 130 MM HG: CPT | Performed by: NURSE PRACTITIONER

## 2025-08-19 PROCEDURE — G8427 DOCREV CUR MEDS BY ELIG CLIN: HCPCS | Performed by: NURSE PRACTITIONER

## 2025-08-19 PROCEDURE — 3079F DIAST BP 80-89 MM HG: CPT | Performed by: NURSE PRACTITIONER

## 2025-08-19 PROCEDURE — 1111F DSCHRG MED/CURRENT MED MERGE: CPT | Performed by: NURSE PRACTITIONER

## 2025-08-19 PROCEDURE — 99213 OFFICE O/P EST LOW 20 MIN: CPT | Performed by: NURSE PRACTITIONER

## 2025-08-19 PROCEDURE — 1036F TOBACCO NON-USER: CPT | Performed by: NURSE PRACTITIONER

## 2025-08-20 ENCOUNTER — TELEPHONE (OUTPATIENT)
Dept: VASCULAR SURGERY | Age: 60
End: 2025-08-20

## (undated) DEVICE — C-ARM: Brand: UNBRANDED

## (undated) DEVICE — FOGARTY ARTERIAL EMBOLECTOMY CATHETER 4F 80CM: Brand: FOGARTY

## (undated) DEVICE — PENCIL BTTN S S CAUT TIP W HOLSTER 25 50

## (undated) DEVICE — LOOP VES W25MM THK1MM MAXI RED SIL FLD REPELLENT 100 PER

## (undated) DEVICE — SURGICAL PROCEDURE PACK VASC LOURDES HOSP

## (undated) DEVICE — SHEET,DRAPE,53X77,STERILE: Brand: MEDLINE

## (undated) DEVICE — DRESSING FOAM W4XL4IN SIL FACE BORD ADH PD SUP ABSRB COR

## (undated) DEVICE — PACK,UNIVERSAL,NO GOWNS: Brand: MEDLINE

## (undated) DEVICE — SUTURE PERMA-HAND SZ 2-0 L30IN NONABSORBABLE BLK L26MM SH K833H

## (undated) DEVICE — SUTURE PERMAHAND SZ 4-0 L12X30IN NONABSORBABLE BLK SILK A303H

## (undated) DEVICE — GLOVE SURG SZ 65 L12IN FNGR THK79MIL GRN LTX FREE

## (undated) DEVICE — SOLUTION IV 500ML 0.9% SOD CHL PH 5 INJ USP VIAFLX PLAS

## (undated) DEVICE — SUTURE PERMAHAND SZ 3-0 L30IN NONABSORBABLE BLK SILK BRAID A304H

## (undated) DEVICE — RADIFOCUS GLIDECATH: Brand: GLIDECATH

## (undated) DEVICE — SUTURE PERMAHAND SZ 2-0 L30IN NONABSORBABLE BLK SILK W/O A305H

## (undated) DEVICE — FOGARTY ARTERIAL EMBOLECTOMY CATHETER 6F 80CM: Brand: FOGARTY

## (undated) DEVICE — CONVERTORS STOCKINETTE: Brand: CONVERTORS

## (undated) DEVICE — SUTURE VICRYL + SZ 3-0 L27IN ABSRB UD L26MM SH 1/2 CIR VCP416H

## (undated) DEVICE — Device

## (undated) DEVICE — NG KIT, 21 GA, 10 PACK: Brand: SITE-RITE

## (undated) DEVICE — SOLUTION IV 100ML 0.9% SOD CHL PLAS CONT USP VIAFLX 1 PER

## (undated) DEVICE — SUTURE NONABSORBABLE MONOFILAMENT 4-0 RB-1 36 IN BLU PROLENE 8557H

## (undated) DEVICE — SUTURE VICRYL SZ 3-0 L18IN ABSRB UD L26MM SH 1/2 CIR J864D

## (undated) DEVICE — ULTRACLEAN ACCESSORY ELECTRODE 1" (2.54 CM) COATED BLADE: Brand: ULTRACLEAN

## (undated) DEVICE — HANDLE LT FOR NLC C SECT RM ST/5

## (undated) DEVICE — FOGARTY ARTERIAL EMBOLECTOMY CATHETER 5F 80CM: Brand: FOGARTY

## (undated) DEVICE — CATHETER KIT 5 FR 21 GAX7 CM MICROINTRODUCER GUIDEWIRE STIFF

## (undated) DEVICE — SUTURE VICRYL + SZ 2-0 L36IN ABSRB UD L36MM CT-1 1/2 CIR VCP945H

## (undated) DEVICE — TUBE ET 7MM NSL ORAL BASIC CUF INTMED MURPHY EYE RADPQ MRK

## (undated) DEVICE — TUBE ET 8MM NSL ORAL BASIC CUF INTMED MURPHY EYE RADPQ MRK

## (undated) DEVICE — TOWEL,OR,DSP,ST,BLUE,DLX,4/PK,20PK/CS: Brand: MEDLINE

## (undated) DEVICE — LOOP VES W13MM THK09MM MINI RED SIL FLD REPELLENT

## (undated) DEVICE — AGENT HEMSTAT W4XL4IN OXIDIZED REGENERATED CELOS STRUCTURED

## (undated) DEVICE — 3M™ IOBAN™ 2 ANTIMICROBIAL INCISE DRAPE 6650EZ: Brand: IOBAN™ 2

## (undated) DEVICE — ANGIOGRAPHY PK

## (undated) DEVICE — GLOVE SURG SZ 85 L12IN FNGR THK79MIL GRN LTX FREE

## (undated) DEVICE — SUTURE BRDD CTX 48 MM CT CRCLE TPR PNT NDLE PLGLCTN PLU

## (undated) DEVICE — GLOW 'N TELL 30CM TAPE (50 STRIPS): Brand: VASCUTAPE RADIOPAQUE TAPE

## (undated) DEVICE — PROVE COVER: Brand: UNBRANDED

## (undated) DEVICE — ROYAL SILK SURGICAL GOWN, XXL: Brand: CONVERTORS

## (undated) DEVICE — SUTURE ABSORBABLE MONOFILAMENT 3-0 SH 27 IN UD PDS + PDP416H

## (undated) DEVICE — OPTIFOAM GENTLE SA, POSTOP, 4X8: Brand: MEDLINE

## (undated) DEVICE — GLOVE SURG SZ 7 CRM LTX FREE POLYISOPRENE POLYMER BEAD ANTI

## (undated) DEVICE — DRAPE SHEET: Brand: UNBRANDED

## (undated) DEVICE — 3M™ BAIR HUGGER® CARDIAC ACCESS BLANKET, 5 PER CASE 63000: Brand: BAIR HUGGER™

## (undated) DEVICE — SYRINGE 20ML LL S/C 50

## (undated) DEVICE — SUTURE PROL SZ 5-0 L36IN NONABSORBABLE BLU L17MM RB-1 1/2 8556H

## (undated) DEVICE — SUTURE PROL SZ 6-0 L30IN NONABSORBABLE BLU L9.3MM BV-1 3/8 M8709

## (undated) DEVICE — GOWN, ORBIS, XLARGE, STERILE: Brand: MEDLINE

## (undated) DEVICE — SUTURE PERMAHAND SZ 3-0 L18IN NONABSORBABLE BLK L26MM SH C013D

## (undated) DEVICE — LOOP VES W1.3MM THK0.9MM MINI WHT SIL FLD REPELLENT

## (undated) DEVICE — SOLUTION IV 250ML 0.9% SOD CHL PH 5 INJ USP VIAFLX PLAS

## (undated) DEVICE — SPONGE LAP W18XL18IN WHT COT 4 PLY FLD STRUNG RADPQ DISP ST 2 PER PACK

## (undated) DEVICE — 1LYRTR 16FR10ML100%SIL UMS SNP: Brand: MEDLINE INDUSTRIES, INC.

## (undated) DEVICE — SOLUTION IV 1000ML 0.9% SOD CHL PH 5 INJ USP VIAFLX PLAS

## (undated) DEVICE — CURAVIEW LED LARYNGOSCOPE BLADE & HANDLE,DISPOSABLE,MILLER 2: Brand: CURAPLEX

## (undated) DEVICE — BLANKET WRM W29.9XL79.1IN UP BODY FORC AIR MISTRAL-AIR